# Patient Record
Sex: FEMALE | Race: BLACK OR AFRICAN AMERICAN | NOT HISPANIC OR LATINO | Employment: OTHER | URBAN - METROPOLITAN AREA
[De-identification: names, ages, dates, MRNs, and addresses within clinical notes are randomized per-mention and may not be internally consistent; named-entity substitution may affect disease eponyms.]

---

## 2020-02-26 ENCOUNTER — TELEPHONE (OUTPATIENT)
Dept: PRIMARY CARE CLINIC | Facility: CLINIC | Age: 75
End: 2020-02-26

## 2020-02-26 NOTE — TELEPHONE ENCOUNTER
I sw pt. She is unsure if  is in network with her ins. She will contact them and let me know tomorrow. She will also check her medications to see how much she has left

## 2020-02-26 NOTE — TELEPHONE ENCOUNTER
----- Message from Anne Hardwick sent at 2/26/2020  3:37 PM CST -----  Contact: pt  Patient contacted [facility]ing scheduling a NPP appointment with this provider. Pt stated she has seen this provider at  and is transitioning over to [facility]. Pt has requested to see this provider, there are no appointments showing in Epic until 4/2020. Is it anyway this patient can be worked into the schedule for a sooner appointment possibly this week or next due to medication refills needed? Please contact pt to advise.

## 2020-02-27 ENCOUNTER — TELEPHONE (OUTPATIENT)
Dept: PRIMARY CARE CLINIC | Facility: CLINIC | Age: 75
End: 2020-02-27

## 2020-02-27 NOTE — TELEPHONE ENCOUNTER
----- Message from Mary Ann Hilliard sent at 2/27/2020  8:57 AM CST -----  Contact: [phone]  Patient called to let Dr. Smith know how many pill she has left on:  Atenolol-12 pills  Amlodipine-8 pills  Please call and advise.

## 2020-02-27 NOTE — TELEPHONE ENCOUNTER
Lm on pts vm to contact the office. Pt was supposed to let me know if she is able to see  with her new ins.

## 2020-02-27 NOTE — TELEPHONE ENCOUNTER
----- Message from Zee Calhoun sent at 2/27/2020 10:49 AM CST -----  Contact: [phone]  Patient is requesting a call from Lisa regarding an appt to see the doctor next week.    Please advise, thank you.

## 2020-02-28 RX ORDER — AMLODIPINE BESYLATE 10 MG/1
10 TABLET ORAL DAILY
COMMUNITY
Start: 2020-02-02 | End: 2020-03-02 | Stop reason: SDUPTHER

## 2020-02-28 RX ORDER — FLUTICASONE PROPIONATE 50 MCG
SPRAY, SUSPENSION (ML) NASAL
COMMUNITY
Start: 2020-02-20 | End: 2020-06-26 | Stop reason: SDUPTHER

## 2020-02-28 RX ORDER — ESCITALOPRAM OXALATE 10 MG/1
10 TABLET ORAL DAILY
COMMUNITY
Start: 2020-01-06 | End: 2020-03-02 | Stop reason: SDUPTHER

## 2020-02-28 RX ORDER — ERYTHROMYCIN 5 MG/G
OINTMENT OPHTHALMIC
COMMUNITY
Start: 2019-12-19 | End: 2020-03-02

## 2020-02-28 RX ORDER — AMLODIPINE BESYLATE 5 MG/1
5 TABLET ORAL DAILY
COMMUNITY
Start: 2019-12-04 | End: 2020-03-02

## 2020-02-28 RX ORDER — HYDROCHLOROTHIAZIDE 25 MG/1
25 TABLET ORAL DAILY
COMMUNITY
Start: 2019-12-18 | End: 2020-03-02 | Stop reason: SDUPTHER

## 2020-02-28 RX ORDER — ATENOLOL 50 MG/1
50 TABLET ORAL DAILY
COMMUNITY
Start: 2020-02-03 | End: 2020-03-02 | Stop reason: SDUPTHER

## 2020-02-28 RX ORDER — ROSUVASTATIN CALCIUM 5 MG/1
5 TABLET, COATED ORAL DAILY
COMMUNITY
Start: 2020-01-31 | End: 2020-03-02 | Stop reason: SDUPTHER

## 2020-03-02 ENCOUNTER — OFFICE VISIT (OUTPATIENT)
Dept: PRIMARY CARE CLINIC | Facility: CLINIC | Age: 75
End: 2020-03-02
Payer: MEDICARE

## 2020-03-02 VITALS
HEART RATE: 68 BPM | TEMPERATURE: 98 F | RESPIRATION RATE: 19 BRPM | SYSTOLIC BLOOD PRESSURE: 123 MMHG | DIASTOLIC BLOOD PRESSURE: 63 MMHG | BODY MASS INDEX: 31.93 KG/M2 | OXYGEN SATURATION: 99 % | HEIGHT: 62 IN | WEIGHT: 173.5 LBS

## 2020-03-02 DIAGNOSIS — I10 HYPERTENSION, UNSPECIFIED TYPE: Primary | ICD-10-CM

## 2020-03-02 DIAGNOSIS — J00 ACUTE RHINITIS: ICD-10-CM

## 2020-03-02 DIAGNOSIS — F41.9 ANXIETY: ICD-10-CM

## 2020-03-02 DIAGNOSIS — R39.9 URINARY SYMPTOM OR SIGN: ICD-10-CM

## 2020-03-02 DIAGNOSIS — E78.5 HYPERLIPIDEMIA, UNSPECIFIED HYPERLIPIDEMIA TYPE: ICD-10-CM

## 2020-03-02 DIAGNOSIS — E04.2 MULTIPLE THYROID NODULES: ICD-10-CM

## 2020-03-02 LAB
BILIRUB SERPL-MCNC: NEGATIVE MG/DL
BLOOD URINE, POC: NEGATIVE
COLOR, POC UA: YELLOW
CTP QC/QA: YES
FLUAV AG NPH QL: NEGATIVE
FLUBV AG NPH QL: NEGATIVE
GLUCOSE UR QL STRIP: NORMAL
KETONES UR QL STRIP: NEGATIVE
LEUKOCYTE ESTERASE URINE, POC: NEGATIVE
NITRITE, POC UA: NEGATIVE
PH, POC UA: 7
PROTEIN, POC: NEGATIVE
SPECIFIC GRAVITY, POC UA: 1
UROBILINOGEN, POC UA: NORMAL

## 2020-03-02 PROCEDURE — 3078F PR MOST RECENT DIASTOLIC BLOOD PRESSURE < 80 MM HG: ICD-10-PCS | Mod: HCNC,CPTII,S$GLB, | Performed by: INTERNAL MEDICINE

## 2020-03-02 PROCEDURE — 3078F DIAST BP <80 MM HG: CPT | Mod: HCNC,CPTII,S$GLB, | Performed by: INTERNAL MEDICINE

## 2020-03-02 PROCEDURE — 1101F PT FALLS ASSESS-DOCD LE1/YR: CPT | Mod: HCNC,CPTII,S$GLB, | Performed by: INTERNAL MEDICINE

## 2020-03-02 PROCEDURE — 87804 INFLUENZA ASSAY W/OPTIC: CPT | Mod: QW,HCNC,S$GLB, | Performed by: INTERNAL MEDICINE

## 2020-03-02 PROCEDURE — 99999 PR PBB SHADOW E&M-EST. PATIENT-LVL III: CPT | Mod: PBBFAC,HCNC,, | Performed by: INTERNAL MEDICINE

## 2020-03-02 PROCEDURE — 1159F PR MEDICATION LIST DOCUMENTED IN MEDICAL RECORD: ICD-10-PCS | Mod: HCNC,S$GLB,, | Performed by: INTERNAL MEDICINE

## 2020-03-02 PROCEDURE — 1101F PR PT FALLS ASSESS DOC 0-1 FALLS W/OUT INJ PAST YR: ICD-10-PCS | Mod: HCNC,CPTII,S$GLB, | Performed by: INTERNAL MEDICINE

## 2020-03-02 PROCEDURE — 1126F AMNT PAIN NOTED NONE PRSNT: CPT | Mod: HCNC,S$GLB,, | Performed by: INTERNAL MEDICINE

## 2020-03-02 PROCEDURE — 1159F MED LIST DOCD IN RCRD: CPT | Mod: HCNC,S$GLB,, | Performed by: INTERNAL MEDICINE

## 2020-03-02 PROCEDURE — 99999 PR PBB SHADOW E&M-EST. PATIENT-LVL III: ICD-10-PCS | Mod: PBBFAC,HCNC,, | Performed by: INTERNAL MEDICINE

## 2020-03-02 PROCEDURE — 99214 OFFICE O/P EST MOD 30 MIN: CPT | Mod: HCNC,25,S$GLB, | Performed by: INTERNAL MEDICINE

## 2020-03-02 PROCEDURE — 3074F PR MOST RECENT SYSTOLIC BLOOD PRESSURE < 130 MM HG: ICD-10-PCS | Mod: HCNC,CPTII,S$GLB, | Performed by: INTERNAL MEDICINE

## 2020-03-02 PROCEDURE — 87804 POCT INFLUENZA A/B: ICD-10-PCS | Mod: 59,QW,HCNC,S$GLB | Performed by: INTERNAL MEDICINE

## 2020-03-02 PROCEDURE — 1126F PR PAIN SEVERITY QUANTIFIED, NO PAIN PRESENT: ICD-10-PCS | Mod: HCNC,S$GLB,, | Performed by: INTERNAL MEDICINE

## 2020-03-02 PROCEDURE — 81002 POCT URINE DIPSTICK WITHOUT MICROSCOPE: ICD-10-PCS | Mod: HCNC,S$GLB,, | Performed by: INTERNAL MEDICINE

## 2020-03-02 PROCEDURE — 81002 URINALYSIS NONAUTO W/O SCOPE: CPT | Mod: HCNC,S$GLB,, | Performed by: INTERNAL MEDICINE

## 2020-03-02 PROCEDURE — 3074F SYST BP LT 130 MM HG: CPT | Mod: HCNC,CPTII,S$GLB, | Performed by: INTERNAL MEDICINE

## 2020-03-02 PROCEDURE — 99214 PR OFFICE/OUTPT VISIT, EST, LEVL IV, 30-39 MIN: ICD-10-PCS | Mod: HCNC,25,S$GLB, | Performed by: INTERNAL MEDICINE

## 2020-03-02 RX ORDER — ROSUVASTATIN CALCIUM 5 MG/1
5 TABLET, COATED ORAL DAILY
Qty: 90 TABLET | Refills: 1 | Status: SHIPPED | OUTPATIENT
Start: 2020-03-02 | End: 2020-06-26 | Stop reason: SDUPTHER

## 2020-03-02 RX ORDER — LORATADINE 10 MG/1
10 TABLET ORAL DAILY PRN
COMMUNITY

## 2020-03-02 RX ORDER — ATENOLOL 50 MG/1
50 TABLET ORAL DAILY
Qty: 90 TABLET | Refills: 1 | Status: SHIPPED | OUTPATIENT
Start: 2020-03-02 | End: 2020-06-02 | Stop reason: SDUPTHER

## 2020-03-02 RX ORDER — NAPROXEN SODIUM 220 MG/1
81 TABLET, FILM COATED ORAL DAILY
COMMUNITY

## 2020-03-02 RX ORDER — HYDROCHLOROTHIAZIDE 25 MG/1
25 TABLET ORAL DAILY
Qty: 90 TABLET | Refills: 1 | Status: SHIPPED | OUTPATIENT
Start: 2020-03-02 | End: 2020-06-02 | Stop reason: SDUPTHER

## 2020-03-02 RX ORDER — AMLODIPINE BESYLATE 10 MG/1
10 TABLET ORAL DAILY
Qty: 90 TABLET | Refills: 1 | Status: SHIPPED | OUTPATIENT
Start: 2020-03-02 | End: 2020-06-02 | Stop reason: SDUPTHER

## 2020-03-02 RX ORDER — EPINEPHRINE 0.3 MG/.3ML
INJECTION SUBCUTANEOUS
COMMUNITY
Start: 2018-11-20 | End: 2021-01-11 | Stop reason: SDUPTHER

## 2020-03-02 RX ORDER — NICOTINE POLACRILEX 2 MG
1000 GUM BUCCAL DAILY
COMMUNITY
Start: 2017-12-12 | End: 2023-03-01

## 2020-03-02 RX ORDER — ESCITALOPRAM OXALATE 10 MG/1
10 TABLET ORAL DAILY
Qty: 90 TABLET | Refills: 1 | Status: SHIPPED | OUTPATIENT
Start: 2020-03-02 | End: 2020-06-26 | Stop reason: SDUPTHER

## 2020-03-02 NOTE — PROGRESS NOTES
[facility] Note    Chief Complaint      Chief Complaint   Patient presents with    Annual Exam       History of Present Illness      Jasmin Alfaro is a 74 y.o. AAF with HTN, HLD, AR, and Anxiety presents to re-est care with me and to fu chronic issues.  Last visit with me was at [facility].  Await old records to review.     Acute rhinitis - Pt reports muscle aches.  No fver.  +Occipital Arevalo, Sore throat.  Suspect Postnasal drip.  Rec cont Claritin and Flonase.  Rapid Flu - neg today.    Urinary Sx's - Will check dip Ua - neg.      HTN - Controlled on Atenolol 50 mg/d , Amlodipine 10 mg/d, HCTZ 25 mg QAm.    HLD - Pt on Crestor 5 mg QHS.     AR -Pt on Claritin and Flonase prn.    Anxiety - Pt on Lexapro 10 mg/d.     GERD - Pt on Omeprazole prn.  Rec Reflux prec.     Thyroid nodules - Fu by Dr. Clemente at [facility].     HCM - Flu - none - pt defers;  Tdap - ?< 10 yrs;  PCV 13 - ?;  PVX 23 - ?;  Shingrix - none;  MGM - due? - pt will check;  DEXA - ?;  PAP - no longer gets; Hep C Screen - none - pt defers; C-scope - '16? +Polyps;  GI - ? In [place];  Prev PCP - me then Dr. Kennedy; Ophtho - Tiffanie Lan    Past Medical History:  Past Medical History:   Diagnosis Date    Allergic rhinitis     Anxiety     Colon polyps     Hyperlipidemia     Hypertension     Multiple thyroid nodules        Past Surgical History:   has a past surgical history that includes Hysterectomy; Appendectomy; Incisional hernia repair; Laparotomy; and Biopsy of thyroid.    Family History:  family history includes Cancer in her mother; Colon cancer in her brother; Heart failure in her brother and mother; Hypertension in her brother, father, mother, and sister; Kidney failure in her brother and sister.     Social History:  Social History     Tobacco Use    Smoking status: Never Smoker    Smokeless tobacco: Never Used   Substance Use Topics    Alcohol use: Not Currently     Frequency: Never    Drug use: Never       Review of Systems  "  Constitutional: Negative for chills, diaphoresis and fever.   HENT: Positive for sore throat.    Eyes: Negative for blurred vision and double vision.        Wears glasses   Respiratory: Negative for cough, shortness of breath and wheezing.    Cardiovascular: Negative for chest pain and palpitations.   Gastrointestinal: Positive for abdominal pain and heartburn. Negative for blood in stool, constipation, diarrhea, melena, nausea and vomiting.        Lower abdomen    Genitourinary: Positive for frequency. Negative for dysuria.        Dark yellow, foul smelling urine.  No Dysuria.    Musculoskeletal: Positive for back pain and myalgias.        Low back pain   Skin: Negative for itching and rash.   Neurological: Positive for headaches. Negative for dizziness.        Occipital    Endo/Heme/Allergies: Negative for polydipsia. Does not bruise/bleed easily.   Psychiatric/Behavioral: Negative for depression and suicidal ideas. The patient is not nervous/anxious.         "I feel better since taking the Lexapro".         Medications:  Outpatient Encounter Medications as of 3/2/2020   Medication Sig Dispense Refill    amLODIPine (NORVASC) 10 MG tablet Take 10 mg by mouth once daily.       aspirin 81 MG Chew Take 81 mg by mouth once daily.       atenoloL (TENORMIN) 50 MG tablet Take 50 mg by mouth once daily.       biotin 1 mg Cap Take 1,000 mcg by mouth once daily.       EPINEPHrine (EPIPEN 2-YAA) 0.3 mg/0.3 mL AtIn as needed.       escitalopram oxalate (LEXAPRO) 10 MG tablet Take 10 mg by mouth once daily.      fluticasone propionate (FLONASE) 50 mcg/actuation nasal spray       hydroCHLOROthiazide (HYDRODIURIL) 25 MG tablet Take 25 mg by mouth once daily.      loratadine (CLARITIN) 10 mg tablet Take 10 mg by mouth daily as needed.       multivit with minerals/lutein (MULTIVITAMIN 50 PLUS ORAL) Take by mouth once daily.       rosuvastatin (CRESTOR) 5 MG tablet Take 5 mg by mouth once daily.      [DISCONTINUED] " amLODIPine (NORVASC) 5 MG tablet Take 5 mg by mouth once daily.      [DISCONTINUED] erythromycin (ROMYCIN) ophthalmic ointment ADMINISTER TO THE RIGHT EYE 4 (FOUR) TIMES A DAY FOR 7 DAYS.       No facility-administered encounter medications on file as of 3/2/2020.        Current Outpatient Medications:     amLODIPine (NORVASC) 10 MG tablet, Take 10 mg by mouth once daily. , Disp: , Rfl:     aspirin 81 MG Chew, Take 81 mg by mouth once daily. , Disp: , Rfl:     atenoloL (TENORMIN) 50 MG tablet, Take 50 mg by mouth once daily. , Disp: , Rfl:     biotin 1 mg Cap, Take 1,000 mcg by mouth once daily. , Disp: , Rfl:     EPINEPHrine (EPIPEN 2-YAA) 0.3 mg/0.3 mL AtIn, as needed. , Disp: , Rfl:     escitalopram oxalate (LEXAPRO) 10 MG tablet, Take 10 mg by mouth once daily., Disp: , Rfl:     fluticasone propionate (FLONASE) 50 mcg/actuation nasal spray, , Disp: , Rfl:     hydroCHLOROthiazide (HYDRODIURIL) 25 MG tablet, Take 25 mg by mouth once daily., Disp: , Rfl:     loratadine (CLARITIN) 10 mg tablet, Take 10 mg by mouth daily as needed. , Disp: , Rfl:     multivit with minerals/lutein (MULTIVITAMIN 50 PLUS ORAL), Take by mouth once daily. , Disp: , Rfl:     rosuvastatin (CRESTOR) 5 MG tablet, Take 5 mg by mouth once daily., Disp: , Rfl:     Allergies:  Review of patient's allergies indicates:   Allergen Reactions    Banana Hives    Meperidine Hives and Other (See Comments)     Unknown reaction         Health Maintenance:    There is no immunization history on file for this patient.   Health Maintenance   Topic Date Due    Hepatitis C Screening  1945    TETANUS VACCINE  06/11/1964    Mammogram  06/11/1986    DEXA SCAN  06/11/1986    Colonoscopy  06/11/1996    Pneumococcal Vaccine (65+ Low/Medium Risk) (1 of 2 - PCV13) 06/11/2011    Lipid Panel  08/22/2012      Objective:      Vital Signs  Temp: 98 °F (36.7 °C)  Temp src: Oral  Pulse: 68  Resp: 19  SpO2: 99 %  BP: 123/63  BP Location: Left  "arm  Patient Position: Sitting  Pain Score: 0-No pain  Height and Weight  Height: 5' 2" (157.5 cm)  Weight: 78.7 kg (173 lb 8 oz)  BSA (Calculated - sq m): 1.86 sq meters  BMI (Calculated): 31.7  Weight in (lb) to have BMI = 25: 136.4]    Laboratory:    Urine Microalbumin/Cr:  Lab Results   Component Value Date    MICALBCREAT 4.8 05/26/2006       Other:   Lab Results   Component Value Date    [id number] 32 09/18/2006     No results found for: PSA, PSADIAG, HEPCAB    Physical Exam   Constitutional: She is oriented to person, place, and time. She appears well-developed and well-nourished. No distress.   HENT:   Head: Normocephalic and atraumatic.   Right Ear: External ear normal.   Left Ear: External ear normal.   Mouth/Throat: Oropharynx is clear and moist.   Eyes: Pupils are equal, round, and reactive to light. Conjunctivae and EOM are normal.   Neck: Normal range of motion. Neck supple. Thyromegaly present.   Cardiovascular: Normal rate, regular rhythm, normal heart sounds and intact distal pulses.   Pulmonary/Chest: Effort normal and breath sounds normal. No respiratory distress.   Abdominal: Soft. Bowel sounds are normal. She exhibits no distension.   Neurological: She is alert and oriented to person, place, and time.   Skin: Skin is warm and dry. She is not diaphoretic.   Psychiatric: She has a normal mood and affect.   Nursing note and vitals reviewed.          Assessment:       1. Hypertension, unspecified type    2. Hyperlipidemia, unspecified hyperlipidemia type    3. Urinary symptom or sign    4. Anxiety    5. Acute rhinitis    6. Multiple thyroid nodules        Jasmin Alfaro is a 74 y.o.female with:    1. Hypertension, unspecified type  -Controlled.  Cont current. Regimen.  Refilled meds.    2. Hyperlipidemia, unspecified hyperlipidemia type  -Cont current.  Will get copy of recent labs from Dr. Kennedy.     3. Urinary symptom or sign  - POCT urine dipstick without microscope  - Dip Ua - neg.     4. " Anxiety  -Improved on Lexapro.  Cont current regimen. Will refill.     5. Acute rhinitis  - POCT Influenza A/B  -Rapid Flu neg.  rec cont Flonasse and Claritin.  Rec supportive care.  Rec strict hand hygiene.  Rec APAP prn T > 100.3/pain. RTC or alert MD if Symptoms persist/worsen.     6. Multiple thyroid nodules  -will get old records to review.     Chronic conditions status updated as per HPI.  Other than changes above, cont current medications and maintain follow up with specialists.  Return to clinic in 3 months or sooner if needed.    Gloria Smith MD  [facility]

## 2020-03-23 ENCOUNTER — TELEPHONE (OUTPATIENT)
Dept: PRIMARY CARE CLINIC | Facility: CLINIC | Age: 75
End: 2020-03-23

## 2020-03-23 NOTE — TELEPHONE ENCOUNTER
Pt is at home quarantining right now. She was in contact with someone that had contact with a positive covid19 pt. She is on day 5 currently.    She is c/o scratchy throat,cough,headaches(which is normal for pt),BA. The cough is not bad enough to take cough meds.    She would like to know if she needs to do anything additional than what she is currently doing.

## 2020-03-23 NOTE — TELEPHONE ENCOUNTER
----- Message from Mary Ann Souza sent at 3/23/2020  9:49 AM CDT -----  Contact: Self   Pt is requesting a call regarding questions. Please call and advise.   2020

## 2020-03-23 NOTE — TELEPHONE ENCOUNTER
Pt was informed and expressed understanding. I sent her an activation link to set up [employer] and explained how to find the silvia on her mobile device

## 2020-03-23 NOTE — TELEPHONE ENCOUNTER
"No. You are doing everything that can be done att this point.  I will need you to monitor for any worsening of Sx's and alert us immediately.  You must remain on isolation for 7 days after the onset of your symptoms or until 3 days asymptomatic (no fever OFF of any antipyretics such as Tylenol) whichever is longer.  I rec using tylenol for aches/pains/fever and avoiding anti-inflammatories such as Ibuprofen, Motrin, Advil, aleve, Goody's, Standbacks, BC powder as these may worsen COVID - 19 symptoms. I know this is difficult but I think it is necessary to keep you healthy and from potentially spreading this dangerous virus. The hope is that if we all practice social distancing that we can "flatten the curve" and keep the spread of COVID - 19 to a minimum.  Please try to have others get your groceries etc for you if possible or order groceries on-line.  If you must go to the store again please try not to touch things as much as possible.  Please avoid touching your face and keep a 6 ft distance between yourself an others in the home. Sanitize your hands when you get in your car and when you get home.  Please stay safe during this pandemic and call for any other needs. Please also know that we have virtual/telemedicine visits available for any of your needs. The [facility] website and cdc websitee are excellent resources in regards to COVID - 19 and I highly recommend you visit these sites.  They are constantly updated with the ever changing info regarding this virus.    Sincerely,   Dr. GALVAN"

## 2020-03-24 ENCOUNTER — TELEPHONE (OUTPATIENT)
Dept: PRIMARY CARE CLINIC | Facility: CLINIC | Age: 75
End: 2020-03-24

## 2020-03-24 NOTE — TELEPHONE ENCOUNTER
Called to check on pt.  No answer.  Was unable to leave a message. Will send a [employer] message.    Dr. GALVAN

## 2020-03-25 ENCOUNTER — TELEPHONE (OUTPATIENT)
Dept: PRIMARY CARE CLINIC | Facility: CLINIC | Age: 75
End: 2020-03-25

## 2020-03-25 NOTE — TELEPHONE ENCOUNTER
I spoke to pt.  No fever.  Still a little weakness and she continues to self isolate.  Doing ok.    Dr. GALVAN

## 2020-03-26 NOTE — TELEPHONE ENCOUNTER
I called to check on pt.  No answer.  Could not leave a message so I sent her a [employer] message.    Dr. GALVAN

## 2020-03-27 ENCOUNTER — TELEPHONE (OUTPATIENT)
Dept: PRIMARY CARE CLINIC | Facility: CLINIC | Age: 75
End: 2020-03-27

## 2020-03-27 NOTE — TELEPHONE ENCOUNTER
I called to check on pt.  She is doing well.  She deneis any fever or SOB. This is day 8 symptoms.  She is self isolating.    Dr. GALVAN

## 2020-06-01 NOTE — PROGRESS NOTES
[facility] Note    Chief Complaint      Chief Complaint   Patient presents with    Follow-up       History of Present Illness      Jasmin Alfaro is a 74 y.o. AAF with HTN, HLD, AR, and Anxiety presents to  to fu chronic issues.  Last visit -3/2/20.         HTN - Controlled on Atenolol 50 mg/d, Amlodipine 10 mg/d, HCTZ 25 mg QAm.     HLD - Pt on Crestor 5 mg QHS.      AR -Pt on Claritin prn and Flonase daily.      Anxiety - Pt on Lexapro 10 mg/d. She weaned to 1/2 tab every other day.      GERD - Pt on Omeprazole prn.  Rec Reflux prec.      Thyroid nodules - Fu by Dr. Clemente at [facility]. Dominant nodules - 1.7 cm on Rt and 1.5 cm on Left. Has upcoming fu.     MNG - Pt prev fu by ENT, Dr. Patel.  She then fu for a second opinion at [facility] with Dr. Robyn Mann.     Osteopenia - h/o Rt humeral neck fracture.     Cataracts - Fu annually by Dr. Roman,     H/O PE - '74- She was tx at the time.  Pt no longer on Anticoagulation.      HCM - Flu - none - pt defers;  Tdap - ?< 10 yrs;  PCV 13 - ?;  PVX 23 - ?;  Shingrix - none;  MGM - due? - pt will check;  DEXA - 1/8/18;  PAP - no longer gets; Hep C Screen - none - pt defers; C-scope - '16? +Polyps;  GI - ? In [place];  Prev PCP - me then Dr. Kennedy; Ortho - Dr. Rodrigues; Ophtho -        Past Medical History:  Past Medical History:   Diagnosis Date    Allergic rhinitis     Anxiety     Colon polyps     Hyperlipidemia     Hypertension     Multiple thyroid nodules        Past Surgical History:   has a past surgical history that includes Hysterectomy; Appendectomy; Incisional hernia repair; Laparotomy; and Biopsy of thyroid.    Family History:  family history includes Cancer in her mother; Colon cancer in her brother; Heart failure in her brother and mother; Hypertension in her brother, father, mother, and sister; Kidney failure in her brother and sister.     Social History:  Social History     Tobacco Use    Smoking status: Never  "Smoker    Smokeless tobacco: Never Used   Substance Use Topics    Alcohol use: Not Currently     Frequency: Never    Drug use: Never       Review of Systems   Constitutional: Negative for chills, diaphoresis and fever.   HENT: Positive for congestion. Negative for sore throat.    Eyes: Negative for blurred vision and double vision.   Respiratory: Negative for shortness of breath.    Cardiovascular: Negative for chest pain and palpitations.   Gastrointestinal: Positive for constipation. Negative for abdominal pain, blood in stool, diarrhea, heartburn, melena, nausea and vomiting.        Sometimes has constipation   Genitourinary: Negative for dysuria and frequency.   Musculoskeletal: Negative for joint pain and myalgias.   Skin: Positive for itching and rash.        To right knee   Neurological: Positive for headaches. Negative for dizziness.        Rare   Endo/Heme/Allergies: Does not bruise/bleed easily.   Psychiatric/Behavioral: Negative for depression. The patient is not nervous/anxious.         "It's fine".         Medications:  Outpatient Encounter Medications as of 6/2/2020   Medication Sig Dispense Refill    amLODIPine (NORVASC) 10 MG tablet Take 1 tablet (10 mg total) by mouth once daily. 90 tablet 1    aspirin 81 MG Chew Take 81 mg by mouth once daily.       atenoloL (TENORMIN) 50 MG tablet Take 1 tablet (50 mg total) by mouth once daily. 90 tablet 1    biotin 1 mg Cap Take 1,000 mcg by mouth once daily.       EPINEPHrine (EPIPEN 2-YAA) 0.3 mg/0.3 mL AtIn as needed.       escitalopram oxalate (LEXAPRO) 10 MG tablet Take 1 tablet (10 mg total) by mouth once daily. 90 tablet 1    fluticasone propionate (FLONASE) 50 mcg/actuation nasal spray       hydroCHLOROthiazide (HYDRODIURIL) 25 MG tablet Take 1 tablet (25 mg total) by mouth once daily. 90 tablet 1    loratadine (CLARITIN) 10 mg tablet Take 10 mg by mouth daily as needed.       multivit with minerals/lutein (MULTIVITAMIN 50 PLUS ORAL) Take by " mouth once daily.       rosuvastatin (CRESTOR) 5 MG tablet Take 1 tablet (5 mg total) by mouth once daily. 90 tablet 1    [DISCONTINUED] amLODIPine (NORVASC) 10 MG tablet Take 1 tablet (10 mg total) by mouth once daily. 90 tablet 1    [DISCONTINUED] atenoloL (TENORMIN) 50 MG tablet Take 1 tablet (50 mg total) by mouth once daily. 90 tablet 1    [DISCONTINUED] hydroCHLOROthiazide (HYDRODIURIL) 25 MG tablet Take 1 tablet (25 mg total) by mouth once daily. 90 tablet 1    triamcinolone acetonide 0.1% (KENALOG) 0.1 % cream Apply topically 2 (two) times daily. 80 g 0     No facility-administered encounter medications on file as of 6/2/2020.        Current Outpatient Medications:     amLODIPine (NORVASC) 10 MG tablet, Take 1 tablet (10 mg total) by mouth once daily., Disp: 90 tablet, Rfl: 1    aspirin 81 MG Chew, Take 81 mg by mouth once daily. , Disp: , Rfl:     atenoloL (TENORMIN) 50 MG tablet, Take 1 tablet (50 mg total) by mouth once daily., Disp: 90 tablet, Rfl: 1    biotin 1 mg Cap, Take 1,000 mcg by mouth once daily. , Disp: , Rfl:     EPINEPHrine (EPIPEN 2-YAA) 0.3 mg/0.3 mL AtIn, as needed. , Disp: , Rfl:     escitalopram oxalate (LEXAPRO) 10 MG tablet, Take 1 tablet (10 mg total) by mouth once daily., Disp: 90 tablet, Rfl: 1    fluticasone propionate (FLONASE) 50 mcg/actuation nasal spray, , Disp: , Rfl:     hydroCHLOROthiazide (HYDRODIURIL) 25 MG tablet, Take 1 tablet (25 mg total) by mouth once daily., Disp: 90 tablet, Rfl: 1    loratadine (CLARITIN) 10 mg tablet, Take 10 mg by mouth daily as needed. , Disp: , Rfl:     multivit with minerals/lutein (MULTIVITAMIN 50 PLUS ORAL), Take by mouth once daily. , Disp: , Rfl:     rosuvastatin (CRESTOR) 5 MG tablet, Take 1 tablet (5 mg total) by mouth once daily., Disp: 90 tablet, Rfl: 1    pneumoc 13-flaca conj-dip cr,PF, (PREVNAR 13, PF,) 0.5 mL Syrg, Inject 0.5 mLs into the muscle once. For one dose. for 1 dose, Disp: 0.5 mL, Rfl: 0    triamcinolone  "acetonide 0.1% (KENALOG) 0.1 % cream, Apply topically 2 (two) times daily., Disp: 80 g, Rfl: 0    varicella-zoster [id number], PF, (SHINGRIX, PF,) 50 mcg/0.5 mL injection, Inject 0.5 mLs into the muscle once. For one dose. for 1 dose, Disp: 0.5 mL, Rfl: 1    Allergies:  Review of patient's allergies indicates:   Allergen Reactions    Banana Hives    Meperidine Hives and Other (See Comments)     Unknown reaction         Health Maintenance:  Immunization History   Administered Date(s) Administered    Pneumococcal Conjugate - 13 Valent 06/02/2020      Health Maintenance   Topic Date Due    Hepatitis C Screening  1945    TETANUS VACCINE  06/11/1964    Mammogram  06/11/1986    DEXA SCAN  06/11/1986    Colonoscopy  06/11/1996    Pneumococcal Vaccine (65+ Low/Medium Risk) (1 of 2 - PCV13) 06/11/2011    Lipid Panel  08/22/2012      Objective:      Vital Signs  Temp: 97.8 °F (36.6 °C)  Pulse: 86  Resp: 19  SpO2: 99 %  BP: 131/65  BP Location: Right arm  Patient Position: Sitting  Pain Score: 0-No pain  Height and Weight  Height: 5' 2" (157.5 cm)  Weight: 81.2 kg (179 lb 0.2 oz)  BSA (Calculated - sq m): 1.88 sq meters  BMI (Calculated): 32.7  Weight in (lb) to have BMI = 25: 136.4]    Laboratory:    URINALYSIS:  Recent Labs   Lab 03/02/20  1553   Color, UA yellow   Spec Grav UA 1.005   pH, UA 7      Recent Labs   Lab 03/02/20  1553   Nitrite, UA negative   Urobilinogen, UA normal      Urine Microalbumin/Cr:  Lab Results   Component Value Date    MICALBCREAT 4.8 05/26/2006       Other:   Lab Results   Component Value Date    [id number] 32 09/18/2006     Physical Exam   Constitutional: She is oriented to person, place, and time. She appears well-developed and well-nourished. No distress.   HENT:   Head: Normocephalic and atraumatic.   Right Ear: External ear normal.   Left Ear: External ear normal.   Mouth/Throat: Oropharynx is clear and moist.   Eyes: Pupils are equal, round, and reactive to light. " Conjunctivae and EOM are normal.   Neck: Normal range of motion. Neck supple. No JVD present. Thyromegaly present.   Cardiovascular: Normal rate, regular rhythm and intact distal pulses. Exam reveals no gallop and no friction rub.   No murmur heard.  Pulmonary/Chest: Effort normal and breath sounds normal. No respiratory distress. She has no wheezes. She has no rales.   Abdominal: Soft. Bowel sounds are normal. She exhibits no distension and no mass. There is no tenderness. There is no rebound and no guarding.   Musculoskeletal: Normal range of motion. She exhibits no edema or deformity.   Lymphadenopathy:     She has no cervical adenopathy.   Neurological: She is alert and oriented to person, place, and time. No cranial nerve deficit.   Skin: Skin is warm and dry. Capillary refill takes less than 2 seconds. No rash noted. She is not diaphoretic. No cyanosis or erythema. Nails show no clubbing.   Psychiatric: She has a normal mood and affect.   Nursing note and vitals reviewed.          Assessment:       1. Hypertension, unspecified type    2. Hyperlipidemia, unspecified hyperlipidemia type    3. Dermatitis    4. Anxiety    5. Need for vaccination with 13-polyvalent pneumococcal conjugate vaccine    6. Need for hepatitis C screening test        Jasmin Alfaro is a 74 y.o.female with:    1. Hypertension, unspecified type  - Comprehensive metabolic panel; Future  - Lipid Panel; Future  - amLODIPine (NORVASC) 10 MG tablet; Take 1 tablet (10 mg total) by mouth once daily.  Dispense: 90 tablet; Refill: 1  - atenoloL (TENORMIN) 50 MG tablet; Take 1 tablet (50 mg total) by mouth once daily.  Dispense: 90 tablet; Refill: 1  - hydroCHLOROthiazide (HYDRODIURIL) 25 MG tablet; Take 1 tablet (25 mg total) by mouth once daily.  Dispense: 90 tablet; Refill: 1  -Stable.  Cont current regimen.    2. Hyperlipidemia, unspecified hyperlipidemia type  - Lipid Panel; Future  -Stable.  Cont current regimen. Repeat FLP.     3. Dermatitis  -  triamcinolone acetonide 0.1% (KENALOG) 0.1 % cream; Apply topically 2 (two) times daily.  Dispense: 80 g; Refill: 0  -Will Try Triamcinolone.    4. Anxiety  -Stable.     5. Need for vaccination with 13-polyvalent pneumococcal conjugate vaccine  -Admin today.     6. Need for hepatitis C screening test  - Hepatitis C Antibody; Future       Chronic conditions status updated as per HPI.  Other than changes above, cont current medications and maintain follow up with specialists.  Return to clinic in 6 months or sooner if needed.    Gloria Smith MD  [facility]

## 2020-06-02 ENCOUNTER — OFFICE VISIT (OUTPATIENT)
Dept: PRIMARY CARE CLINIC | Facility: CLINIC | Age: 75
End: 2020-06-02
Payer: MEDICARE

## 2020-06-02 ENCOUNTER — IMMUNIZATION (OUTPATIENT)
Dept: PHARMACY | Facility: CLINIC | Age: 75
End: 2020-06-02
Payer: MEDICARE

## 2020-06-02 ENCOUNTER — LAB VISIT (OUTPATIENT)
Dept: LAB | Facility: HOSPITAL | Age: 75
End: 2020-06-02
Attending: INTERNAL MEDICINE
Payer: MEDICARE

## 2020-06-02 VITALS
SYSTOLIC BLOOD PRESSURE: 131 MMHG | RESPIRATION RATE: 19 BRPM | HEIGHT: 62 IN | HEART RATE: 86 BPM | BODY MASS INDEX: 32.94 KG/M2 | OXYGEN SATURATION: 99 % | DIASTOLIC BLOOD PRESSURE: 65 MMHG | WEIGHT: 179 LBS | TEMPERATURE: 98 F

## 2020-06-02 DIAGNOSIS — F41.9 ANXIETY: ICD-10-CM

## 2020-06-02 DIAGNOSIS — Z11.59 NEED FOR HEPATITIS C SCREENING TEST: ICD-10-CM

## 2020-06-02 DIAGNOSIS — Z23 NEED FOR VACCINATION WITH 13-POLYVALENT PNEUMOCOCCAL CONJUGATE VACCINE: ICD-10-CM

## 2020-06-02 DIAGNOSIS — E78.5 HYPERLIPIDEMIA, UNSPECIFIED HYPERLIPIDEMIA TYPE: ICD-10-CM

## 2020-06-02 DIAGNOSIS — I10 HYPERTENSION, UNSPECIFIED TYPE: ICD-10-CM

## 2020-06-02 DIAGNOSIS — I10 HYPERTENSION, UNSPECIFIED TYPE: Primary | ICD-10-CM

## 2020-06-02 DIAGNOSIS — L30.9 DERMATITIS: ICD-10-CM

## 2020-06-02 LAB
ALBUMIN SERPL BCP-MCNC: 4.5 G/DL (ref 3.5–5.2)
ALP SERPL-CCNC: 80 U/L (ref 55–135)
ALT SERPL W/O P-5'-P-CCNC: 19 U/L (ref 10–44)
ANION GAP SERPL CALC-SCNC: 8 MMOL/L (ref 8–16)
AST SERPL-CCNC: 28 U/L (ref 10–40)
BILIRUB SERPL-MCNC: 0.5 MG/DL (ref 0.1–1)
BUN SERPL-MCNC: 16 MG/DL (ref 8–23)
CALCIUM SERPL-MCNC: 10.1 MG/DL (ref 8.7–10.5)
CHLORIDE SERPL-SCNC: 100 MMOL/L (ref 95–110)
CHOLEST SERPL-MCNC: 149 MG/DL (ref 120–199)
CHOLEST/HDLC SERPL: 2.8 {RATIO} (ref 2–5)
CO2 SERPL-SCNC: 28 MMOL/L (ref 23–29)
CREAT SERPL-MCNC: 1.1 MG/DL (ref 0.5–1.4)
EST. GFR  (AFRICAN AMERICAN): 57.6 ML/MIN/1.73 M^2
EST. GFR  (NON AFRICAN AMERICAN): 49.9 ML/MIN/1.73 M^2
GLUCOSE SERPL-MCNC: 111 MG/DL (ref 70–110)
HDLC SERPL-MCNC: 53 MG/DL (ref 40–75)
HDLC SERPL: 35.6 % (ref 20–50)
LDLC SERPL CALC-MCNC: 79.4 MG/DL (ref 63–159)
NONHDLC SERPL-MCNC: 96 MG/DL
POTASSIUM SERPL-SCNC: 3.9 MMOL/L (ref 3.5–5.1)
PROT SERPL-MCNC: 8.1 G/DL (ref 6–8.4)
SODIUM SERPL-SCNC: 136 MMOL/L (ref 136–145)
TRIGL SERPL-MCNC: 83 MG/DL (ref 30–150)

## 2020-06-02 PROCEDURE — 99214 OFFICE O/P EST MOD 30 MIN: CPT | Mod: HCNC,S$GLB,, | Performed by: INTERNAL MEDICINE

## 2020-06-02 PROCEDURE — 1159F PR MEDICATION LIST DOCUMENTED IN MEDICAL RECORD: ICD-10-PCS | Mod: HCNC,S$GLB,, | Performed by: INTERNAL MEDICINE

## 2020-06-02 PROCEDURE — 99499 UNLISTED E&M SERVICE: CPT | Mod: HCNC,S$GLB,, | Performed by: INTERNAL MEDICINE

## 2020-06-02 PROCEDURE — 99214 PR OFFICE/OUTPT VISIT, EST, LEVL IV, 30-39 MIN: ICD-10-PCS | Mod: HCNC,S$GLB,, | Performed by: INTERNAL MEDICINE

## 2020-06-02 PROCEDURE — 3078F PR MOST RECENT DIASTOLIC BLOOD PRESSURE < 80 MM HG: ICD-10-PCS | Mod: HCNC,CPTII,S$GLB, | Performed by: INTERNAL MEDICINE

## 2020-06-02 PROCEDURE — 99499 UNLISTED E&M SERVICE: CPT | Mod: HCNC,,, | Performed by: INTERNAL MEDICINE

## 2020-06-02 PROCEDURE — 86803 HEPATITIS C AB TEST: CPT | Mod: HCNC

## 2020-06-02 PROCEDURE — 99499 RISK ADDL DX/OHS AUDIT: ICD-10-PCS | Mod: HCNC,S$GLB,, | Performed by: INTERNAL MEDICINE

## 2020-06-02 PROCEDURE — 3075F SYST BP GE 130 - 139MM HG: CPT | Mod: HCNC,CPTII,S$GLB, | Performed by: INTERNAL MEDICINE

## 2020-06-02 PROCEDURE — 3078F DIAST BP <80 MM HG: CPT | Mod: HCNC,CPTII,S$GLB, | Performed by: INTERNAL MEDICINE

## 2020-06-02 PROCEDURE — 1159F MED LIST DOCD IN RCRD: CPT | Mod: HCNC,S$GLB,, | Performed by: INTERNAL MEDICINE

## 2020-06-02 PROCEDURE — 1126F PR PAIN SEVERITY QUANTIFIED, NO PAIN PRESENT: ICD-10-PCS | Mod: HCNC,S$GLB,, | Performed by: INTERNAL MEDICINE

## 2020-06-02 PROCEDURE — 80061 LIPID PANEL: CPT | Mod: HCNC

## 2020-06-02 PROCEDURE — 1101F PT FALLS ASSESS-DOCD LE1/YR: CPT | Mod: HCNC,CPTII,S$GLB, | Performed by: INTERNAL MEDICINE

## 2020-06-02 PROCEDURE — 99999 PR PBB SHADOW E&M-EST. PATIENT-LVL IV: CPT | Mod: PBBFAC,HCNC,, | Performed by: INTERNAL MEDICINE

## 2020-06-02 PROCEDURE — 99999 PR PBB SHADOW E&M-EST. PATIENT-LVL IV: ICD-10-PCS | Mod: PBBFAC,HCNC,, | Performed by: INTERNAL MEDICINE

## 2020-06-02 PROCEDURE — 99499 RISK ADDL DX/OHS AUDIT: ICD-10-PCS | Mod: HCNC,,, | Performed by: INTERNAL MEDICINE

## 2020-06-02 PROCEDURE — 1126F AMNT PAIN NOTED NONE PRSNT: CPT | Mod: HCNC,S$GLB,, | Performed by: INTERNAL MEDICINE

## 2020-06-02 PROCEDURE — 80053 COMPREHEN METABOLIC PANEL: CPT | Mod: HCNC

## 2020-06-02 PROCEDURE — 36415 COLL VENOUS BLD VENIPUNCTURE: CPT | Mod: HCNC,PN

## 2020-06-02 PROCEDURE — 3075F PR MOST RECENT SYSTOLIC BLOOD PRESS GE 130-139MM HG: ICD-10-PCS | Mod: HCNC,CPTII,S$GLB, | Performed by: INTERNAL MEDICINE

## 2020-06-02 PROCEDURE — 1101F PR PT FALLS ASSESS DOC 0-1 FALLS W/OUT INJ PAST YR: ICD-10-PCS | Mod: HCNC,CPTII,S$GLB, | Performed by: INTERNAL MEDICINE

## 2020-06-02 RX ORDER — AMLODIPINE BESYLATE 10 MG/1
10 TABLET ORAL DAILY
Qty: 90 TABLET | Refills: 1 | Status: SHIPPED | OUTPATIENT
Start: 2020-06-02 | End: 2020-06-26 | Stop reason: SDUPTHER

## 2020-06-02 RX ORDER — ATENOLOL 50 MG/1
50 TABLET ORAL DAILY
Qty: 90 TABLET | Refills: 1 | Status: SHIPPED | OUTPATIENT
Start: 2020-06-02 | End: 2020-06-26 | Stop reason: SDUPTHER

## 2020-06-02 RX ORDER — HYDROCHLOROTHIAZIDE 25 MG/1
25 TABLET ORAL DAILY
Qty: 90 TABLET | Refills: 1 | Status: SHIPPED | OUTPATIENT
Start: 2020-06-02 | End: 2020-06-26 | Stop reason: SDUPTHER

## 2020-06-02 RX ORDER — TRIAMCINOLONE ACETONIDE 1 MG/G
CREAM TOPICAL 2 TIMES DAILY
Qty: 80 G | Refills: 0 | Status: SHIPPED | OUTPATIENT
Start: 2020-06-02 | End: 2023-01-12

## 2020-06-03 LAB — HCV AB SERPL QL IA: NEGATIVE

## 2020-06-04 NOTE — PROGRESS NOTES
I sent pt a my chart message -  I reviewed your labs.  Your HepC screen was negative.  Your kidney function was slightly low but stable for you and ok for age. Your cholesterol is well controlled.  No further recommendations at this time.  Have a Great Day!    Dr. GALVAN

## 2020-06-05 ENCOUNTER — TELEPHONE (OUTPATIENT)
Dept: PRIMARY CARE CLINIC | Facility: CLINIC | Age: 75
End: 2020-06-05

## 2020-06-05 DIAGNOSIS — Z12.31 ENCOUNTER FOR SCREENING MAMMOGRAM FOR MALIGNANT NEOPLASM OF BREAST: Primary | ICD-10-CM

## 2020-06-05 NOTE — TELEPHONE ENCOUNTER
----- Message from Robyn Ramon sent at 6/5/2020 12:33 PM CDT -----  Contact: Patient [phone]  Caller is requesting to schedule their annual screening mammogram appointment. Order is not listed in Epic.  Please enter order and contact patient to schedule.  Where would they like the mammogram performed?:  [facility]   Would the patient rather receive a phone call back or a response via [facility]?:   Call  Additional information:

## 2020-06-17 ENCOUNTER — HOSPITAL ENCOUNTER (OUTPATIENT)
Dept: RADIOLOGY | Facility: HOSPITAL | Age: 75
Discharge: HOME | End: 2020-06-17
Attending: INTERNAL MEDICINE
Payer: MEDICARE

## 2020-06-17 ENCOUNTER — TELEPHONE (OUTPATIENT)
Dept: PRIMARY CARE CLINIC | Facility: CLINIC | Age: 75
End: 2020-06-17

## 2020-06-17 DIAGNOSIS — Z12.31 ENCOUNTER FOR SCREENING MAMMOGRAM FOR MALIGNANT NEOPLASM OF BREAST: ICD-10-CM

## 2020-06-17 PROCEDURE — 77067 SCR MAMMO BI INCL CAD: CPT | Mod: 26,HCNC,, | Performed by: RADIOLOGY

## 2020-06-17 PROCEDURE — 77067 SCR MAMMO BI INCL CAD: CPT | Mod: TC,HCNC,PN

## 2020-06-17 PROCEDURE — 77063 BREAST TOMOSYNTHESIS BI: CPT | Mod: 26,HCNC,, | Performed by: RADIOLOGY

## 2020-06-17 PROCEDURE — 77063 MAMMO DIGITAL SCREENING BILAT WITH TOMOSYNTHESIS_CAD: ICD-10-PCS | Mod: 26,HCNC,, | Performed by: RADIOLOGY

## 2020-06-17 PROCEDURE — 77067 MAMMO DIGITAL SCREENING BILAT WITH TOMOSYNTHESIS_CAD: ICD-10-PCS | Mod: 26,HCNC,, | Performed by: RADIOLOGY

## 2020-06-17 NOTE — TELEPHONE ENCOUNTER
----- Message from Gloria Smith MD sent at 6/17/2020  6:21 PM CDT -----  Good question not per the Radiologists reading.  I don't think you need the [place].    Dr. GALVAN  ----- Message -----  From: Anibal Sauceda LPN  Sent: 6/17/2020   6:15 PM CDT  To: Gloria Smith MD    Patient states every year at [facility] she would get Mammogram and Ultrasound. Patient would like to know if she should get ultrasound this year also.

## 2020-06-17 NOTE — PROGRESS NOTES
I sent pt a my chart message -  I reviewed your Mammogram -  The breasts have scattered areas of fibroglandular density. There is no evidence of suspicious masses, microcalcifications or architectural distortion.     Impression:  No mammographic evidence of malignancy.  Routine screening mammogram in 1 year is recommended.     Dr. GALVAN

## 2020-06-26 DIAGNOSIS — F41.9 ANXIETY: ICD-10-CM

## 2020-06-26 DIAGNOSIS — I10 HYPERTENSION, UNSPECIFIED TYPE: ICD-10-CM

## 2020-06-26 DIAGNOSIS — E78.5 HYPERLIPIDEMIA, UNSPECIFIED HYPERLIPIDEMIA TYPE: ICD-10-CM

## 2020-06-26 RX ORDER — AMLODIPINE BESYLATE 10 MG/1
10 TABLET ORAL DAILY
Qty: 90 TABLET | Refills: 1 | Status: SHIPPED | OUTPATIENT
Start: 2020-06-26 | End: 2021-01-11 | Stop reason: SDUPTHER

## 2020-06-26 RX ORDER — HYDROCHLOROTHIAZIDE 25 MG/1
25 TABLET ORAL DAILY
Qty: 90 TABLET | Refills: 1 | Status: SHIPPED | OUTPATIENT
Start: 2020-06-26 | End: 2020-08-27

## 2020-06-26 RX ORDER — FLUTICASONE PROPIONATE 50 MCG
2 SPRAY, SUSPENSION (ML) NASAL DAILY
Qty: 54.6 ML | Refills: 1 | Status: SHIPPED | OUTPATIENT
Start: 2020-06-26 | End: 2020-11-08

## 2020-06-26 RX ORDER — ESCITALOPRAM OXALATE 10 MG/1
10 TABLET ORAL DAILY
Qty: 90 TABLET | Refills: 1 | Status: SHIPPED | OUTPATIENT
Start: 2020-06-26 | End: 2020-08-14

## 2020-06-26 RX ORDER — ROSUVASTATIN CALCIUM 5 MG/1
5 TABLET, COATED ORAL DAILY
Qty: 90 TABLET | Refills: 1 | Status: SHIPPED | OUTPATIENT
Start: 2020-06-26 | End: 2020-11-08

## 2020-06-26 RX ORDER — ATENOLOL 50 MG/1
50 TABLET ORAL DAILY
Qty: 90 TABLET | Refills: 1 | Status: SHIPPED | OUTPATIENT
Start: 2020-06-26 | End: 2020-11-08

## 2020-06-26 NOTE — TELEPHONE ENCOUNTER
LOv 6/2/2020\  Pt would like all her meds sent to [facility] pharmacy vs locally

## 2020-07-15 ENCOUNTER — TELEPHONE (OUTPATIENT)
Dept: PRIMARY CARE CLINIC | Facility: CLINIC | Age: 75
End: 2020-07-15

## 2020-07-15 NOTE — TELEPHONE ENCOUNTER
----- Message from Carmina Sawant sent at 7/15/2020 11:50 AM CDT -----  Regarding: pre op clearance  Contact: jazzy [phone]  Patient is having eye surgery which is scheduled for 8/20/2020. No appointments are available

## 2020-07-17 ENCOUNTER — TELEPHONE (OUTPATIENT)
Dept: PRIMARY CARE CLINIC | Facility: CLINIC | Age: 75
End: 2020-07-17

## 2020-07-17 NOTE — TELEPHONE ENCOUNTER
----- Message from Lu Smith sent at 7/17/2020  2:25 PM CDT -----  Contact: Patient [phone]  Returning a phone call.    Who left a message for the patient:  nurse    Do they know what this is regarding:  a pre op appt for the soonest    Would they like a phone call back or a response via [facility]:  call back

## 2020-07-17 NOTE — TELEPHONE ENCOUNTER
----- Message from Mireya Singh sent at 7/17/2020 11:06 AM CDT -----  Contact: self [phone]  Patient is returning a phone call.  Who left a message for the patient: Lisa Arcos MA  Does patient know what this is regarding:  pre op appt  Comments:

## 2020-07-31 ENCOUNTER — PATIENT OUTREACH (OUTPATIENT)
Dept: ADMINISTRATIVE | Facility: HOSPITAL | Age: 75
End: 2020-07-31

## 2020-07-31 NOTE — PROGRESS NOTES
Pre-visit chart review completed.  Immunizations reviewed and updated.    Patient due for the following    TETANUS VACCINE     DEXA SCAN     Shingles Vaccine (1 of 2)    Colorectal Cancer Screening

## 2020-08-12 ENCOUNTER — TELEPHONE (OUTPATIENT)
Dept: PRIMARY CARE CLINIC | Facility: CLINIC | Age: 75
End: 2020-08-12

## 2020-08-12 NOTE — TELEPHONE ENCOUNTER
----- Message from Jordyn Hui sent at 8/12/2020 11:29 AM CDT -----  Regarding: Ms. CastellanosFiylg-[phone]  Ms. Castellanos is requesting a callback.      Callback number: Ms. CastellanosTgmzj-[phone]

## 2020-08-12 NOTE — TELEPHONE ENCOUNTER
I sw pt. Her sister tested positive for covid. She was in contact with her last week. Pt is asymptomatic and went to [facility] site on Monday 8/10/2020 for testing. Her results are still pending. Pt is having surgery on 8/20/2020 and is scheduled for pre op exam on 8/14/2020. She will keep us posted on the results

## 2020-08-13 ENCOUNTER — TELEPHONE (OUTPATIENT)
Dept: PRIMARY CARE CLINIC | Facility: CLINIC | Age: 75
End: 2020-08-13

## 2020-08-13 RX ORDER — DIPHENHYDRAMINE HCL 25 MG
TABLET ORAL
COMMUNITY
End: 2020-08-14

## 2020-08-13 RX ORDER — DUREZOL 0.5 MG/ML
EMULSION OPHTHALMIC
COMMUNITY
Start: 2020-07-02 | End: 2021-01-11

## 2020-08-13 RX ORDER — OFLOXACIN 3 MG/ML
SOLUTION/ DROPS OPHTHALMIC
COMMUNITY
Start: 2020-07-02 | End: 2021-01-11

## 2020-08-14 ENCOUNTER — TELEPHONE (OUTPATIENT)
Dept: PRIMARY CARE CLINIC | Facility: CLINIC | Age: 75
End: 2020-08-14

## 2020-08-14 ENCOUNTER — OFFICE VISIT (OUTPATIENT)
Dept: PRIMARY CARE CLINIC | Facility: CLINIC | Age: 75
End: 2020-08-14
Payer: MEDICARE

## 2020-08-14 VITALS — SYSTOLIC BLOOD PRESSURE: 141 MMHG | DIASTOLIC BLOOD PRESSURE: 72 MMHG | HEART RATE: 65 BPM

## 2020-08-14 DIAGNOSIS — Z12.11 COLON CANCER SCREENING: Primary | ICD-10-CM

## 2020-08-14 DIAGNOSIS — Z01.818 PREOP EXAMINATION: Primary | ICD-10-CM

## 2020-08-14 DIAGNOSIS — I10 HYPERTENSION, UNSPECIFIED TYPE: ICD-10-CM

## 2020-08-14 DIAGNOSIS — E78.5 HYPERLIPIDEMIA, UNSPECIFIED HYPERLIPIDEMIA TYPE: ICD-10-CM

## 2020-08-14 PROBLEM — J00 ACUTE RHINITIS: Status: RESOLVED | Noted: 2020-03-02 | Resolved: 2020-08-14

## 2020-08-14 PROBLEM — R39.9 URINARY SYMPTOM OR SIGN: Status: RESOLVED | Noted: 2020-03-02 | Resolved: 2020-08-14

## 2020-08-14 PROBLEM — Z11.59 NEED FOR HEPATITIS C SCREENING TEST: Status: RESOLVED | Noted: 2020-06-02 | Resolved: 2020-08-14

## 2020-08-14 PROCEDURE — 99213 OFFICE O/P EST LOW 20 MIN: CPT | Mod: HCNC,95,, | Performed by: INTERNAL MEDICINE

## 2020-08-14 PROCEDURE — 1159F PR MEDICATION LIST DOCUMENTED IN MEDICAL RECORD: ICD-10-PCS | Mod: HCNC,95,, | Performed by: INTERNAL MEDICINE

## 2020-08-14 PROCEDURE — 1159F MED LIST DOCD IN RCRD: CPT | Mod: HCNC,95,, | Performed by: INTERNAL MEDICINE

## 2020-08-14 PROCEDURE — 1101F PR PT FALLS ASSESS DOC 0-1 FALLS W/OUT INJ PAST YR: ICD-10-PCS | Mod: HCNC,CPTII,95, | Performed by: INTERNAL MEDICINE

## 2020-08-14 PROCEDURE — 3078F DIAST BP <80 MM HG: CPT | Mod: HCNC,CPTII,95, | Performed by: INTERNAL MEDICINE

## 2020-08-14 PROCEDURE — 99213 PR OFFICE/OUTPT VISIT, EST, LEVL III, 20-29 MIN: ICD-10-PCS | Mod: HCNC,95,, | Performed by: INTERNAL MEDICINE

## 2020-08-14 PROCEDURE — 3077F PR MOST RECENT SYSTOLIC BLOOD PRESSURE >= 140 MM HG: ICD-10-PCS | Mod: HCNC,CPTII,95, | Performed by: INTERNAL MEDICINE

## 2020-08-14 PROCEDURE — 1101F PT FALLS ASSESS-DOCD LE1/YR: CPT | Mod: HCNC,CPTII,95, | Performed by: INTERNAL MEDICINE

## 2020-08-14 PROCEDURE — 3078F PR MOST RECENT DIASTOLIC BLOOD PRESSURE < 80 MM HG: ICD-10-PCS | Mod: HCNC,CPTII,95, | Performed by: INTERNAL MEDICINE

## 2020-08-14 PROCEDURE — 3077F SYST BP >= 140 MM HG: CPT | Mod: HCNC,CPTII,95, | Performed by: INTERNAL MEDICINE

## 2020-08-14 NOTE — TELEPHONE ENCOUNTER
----- Message from Gloria Smith MD sent at 8/14/2020 12:46 PM CDT -----  It appears pt is due for repeat C-scope.  I would like to refer her to GI for C-scope if she is agreeable.  I think she had polyps per C-scope in '15 and was told to fu in 5 yrs.  Let me know if she is ok with this and I can put in referral.     Dr. GALVAN

## 2020-08-14 NOTE — PROGRESS NOTES
"[facility] Note    Chief Complaint    No chief complaint on file.      History of Present Illness      Jasmin Alfaro is a 75 y.o. AAF with HTN, HLD, AR, and Anxiety presents to  to fu chronic issues.  Last visit -6/2/20.    The patient location is: "home"  The chief complaint leading to consultation is: "Preop clearance"    Visit type: audiovisual    Face to Face time with patient:10 minutes  10 minutes of total time spent on the encounter, which includes face to face time and non-face to face time preparing to see the patient (eg, review of tests), Obtaining and/or reviewing separately obtained history, Documenting clinical information in the electronic or other health record, Independently interpreting results (not separately reported) and communicating results to the patient/family/caregiver, or Care coordination (not separately reported).         Each patient to whom he or she provides medical services by telemedicine is:  (1) informed of the relationship between the physician and patient and the respective role of any other health care provider with respect to management of the patient; and (2) notified that he or she may decline to receive medical services by telemedicine and may withdraw from such care at any time.    Notes:     Preop exam -   Pt sched for RT cataract Extraction to be done 8/20/20      HTN - Controlled on Atenolol 50 mg/d, Amlodipine 10 mg/d, HCTZ 25 mg QAm.     HLD - Pt controlled on Crestor 5 mg QHS.      AR -Pt on Claritin prn and Flonase daily.      Anxiety - Pt weaned off Lexapro 10 mg/d due to wt gain. "I'm doing fine right now".       GERD - Pt on Omeprazole prn.  Rec Reflux prec.      Thyroid nodules - Fu by Dr. Clemente at [facility]. Dominant nodules - 1.7 cm on Rt and 1.5 cm on Left. Has upcoming fu in Sept.      MNG - Pt prev fu by ENT, Dr. Patel.  She then fu for a second opinion at [facility] with Dr. Robyn Mann.      Osteopenia - h/o Rt humeral neck fracture. "      Cataracts - Fu annually by Dr. Roman,      H/O PE - '74- She was tx at the time.  Pt no longer on Anticoagulation.      HCM - Flu - none - pt defers;  Tdap - ?< 10 yrs;  PCV 13 - ?;  PVX 23 - ?;  Shingrix - none;  MGM - due? - pt will check;  DEXA - 1/8/18;  PAP - no longer gets; Hep C Screen - neg - 6/2/20; C-scope - '15 +Polyps- told to repeat in 5 yrs- will refer to GI;  GI - ? In [place];  Prev PCP - me then Dr. Kennedy; Ortho - Dr. Rodrigues; Ophtho -       Past Medical History:  Past Medical History:   Diagnosis Date    Allergic rhinitis     Anxiety     Colon polyps     Hyperlipidemia     Hypertension     Multiple thyroid nodules        Past Surgical History:   has a past surgical history that includes Hysterectomy; Appendectomy; Incisional hernia repair; Laparotomy; and Biopsy of thyroid.    Family History:  family history includes Cancer in her mother; Colon cancer in her brother; Heart failure in her brother and mother; Hypertension in her brother, father, mother, and sister; Kidney failure in her brother and sister.     Social History:  Social History     Tobacco Use    Smoking status: Never Smoker    Smokeless tobacco: Never Used   Substance Use Topics    Alcohol use: Not Currently     Frequency: Never    Drug use: Never       Review of Systems   Constitutional: Negative for chills, diaphoresis and fever.   HENT: Negative for hearing loss and sore throat.    Eyes: Negative for blurred vision, double vision and discharge.        Wears glasses.  Glare bothers her.    Respiratory: Negative for cough, shortness of breath and wheezing.    Cardiovascular: Negative for chest pain and palpitations.   Gastrointestinal: Negative for blood in stool, constipation, diarrhea, nausea and vomiting.   Genitourinary: Negative for dysuria and hematuria.   Musculoskeletal: Negative for neck pain.   Neurological: Negative for weakness and headaches.   Endo/Heme/Allergies: Negative for polydipsia.    Psychiatric/Behavioral: The patient is not nervous/anxious.         Medications:  Outpatient Encounter Medications as of 8/14/2020   Medication Sig Dispense Refill    amLODIPine (NORVASC) 10 MG tablet Take 1 tablet (10 mg total) by mouth once daily. 90 tablet 1    aspirin 81 MG Chew Take 81 mg by mouth once daily.       atenoloL (TENORMIN) 50 MG tablet Take 1 tablet (50 mg total) by mouth once daily. 90 tablet 1    biotin 1 mg Cap Take 1,000 mcg by mouth once daily.       fluticasone propionate (FLONASE) 50 mcg/actuation nasal spray 2 sprays (100 mcg total) by Each Nostril route once daily. 54.6 mL 1    hydroCHLOROthiazide (HYDRODIURIL) 25 MG tablet Take 1 tablet (25 mg total) by mouth once daily. 90 tablet 1    loratadine (CLARITIN) 10 mg tablet Take 10 mg by mouth daily as needed.       multivit with minerals/lutein (MULTIVITAMIN 50 PLUS ORAL) Take by mouth once daily.       ofloxacin (OCUFLOX) 0.3 % ophthalmic solution USE 1 DROP INTO RIGHT EYE FOUR TIMES A DAY AS DIRECTED START DAY BEFORE SURGERY      rosuvastatin (CRESTOR) 5 MG tablet Take 1 tablet (5 mg total) by mouth once daily. 90 tablet 1    triamcinolone acetonide 0.1% (KENALOG) 0.1 % cream Apply topically 2 (two) times daily. 80 g 0    DUREZOL 0.05 % Drop ophthalmic solution INSTILL 2 DROP INTO RIGHT EYE TWICE A DAY AS DIRECTED START AFTER SURGERY      EPINEPHrine (EPIPEN 2-YAA) 0.3 mg/0.3 mL AtIn as needed.       [DISCONTINUED] diphenhydrAMINE (BENADRYL ALLERGY) 25 mg tablet 1 tablet as needed      [DISCONTINUED] escitalopram oxalate (LEXAPRO) 10 MG tablet Take 1 tablet (10 mg total) by mouth once daily. 90 tablet 1    [DISCONTINUED] ranitidine (ZANTAC) 150 MG capsule 1 tablet       No facility-administered encounter medications on file as of 8/14/2020.        Current Outpatient Medications:     amLODIPine (NORVASC) 10 MG tablet, Take 1 tablet (10 mg total) by mouth once daily., Disp: 90 tablet, Rfl: 1    aspirin 81 MG Chew, Take 81  mg by mouth once daily. , Disp: , Rfl:     atenoloL (TENORMIN) 50 MG tablet, Take 1 tablet (50 mg total) by mouth once daily., Disp: 90 tablet, Rfl: 1    biotin 1 mg Cap, Take 1,000 mcg by mouth once daily. , Disp: , Rfl:     fluticasone propionate (FLONASE) 50 mcg/actuation nasal spray, 2 sprays (100 mcg total) by Each Nostril route once daily., Disp: 54.6 mL, Rfl: 1    hydroCHLOROthiazide (HYDRODIURIL) 25 MG tablet, Take 1 tablet (25 mg total) by mouth once daily., Disp: 90 tablet, Rfl: 1    loratadine (CLARITIN) 10 mg tablet, Take 10 mg by mouth daily as needed. , Disp: , Rfl:     multivit with minerals/lutein (MULTIVITAMIN 50 PLUS ORAL), Take by mouth once daily. , Disp: , Rfl:     ofloxacin (OCUFLOX) 0.3 % ophthalmic solution, USE 1 DROP INTO RIGHT EYE FOUR TIMES A DAY AS DIRECTED START DAY BEFORE SURGERY, Disp: , Rfl:     rosuvastatin (CRESTOR) 5 MG tablet, Take 1 tablet (5 mg total) by mouth once daily., Disp: 90 tablet, Rfl: 1    triamcinolone acetonide 0.1% (KENALOG) 0.1 % cream, Apply topically 2 (two) times daily., Disp: 80 g, Rfl: 0    DUREZOL 0.05 % Drop ophthalmic solution, INSTILL 2 DROP INTO RIGHT EYE TWICE A DAY AS DIRECTED START AFTER SURGERY, Disp: , Rfl:     EPINEPHrine (EPIPEN 2-YAA) 0.3 mg/0.3 mL AtIn, as needed. , Disp: , Rfl:     Allergies:  Review of patient's allergies indicates:   Allergen Reactions    Banana Hives    Meperidine Hives and Other (See Comments)     Unknown reaction         Health Maintenance:  Immunization History   Administered Date(s) Administered    Pneumococcal Conjugate - 13 Valent 06/02/2020      Health Maintenance   Topic Date Due    TETANUS VACCINE  06/11/1964    DEXA SCAN  06/11/1986    Pneumococcal Vaccine (65+ Low/Medium Risk) (2 of 2 - PPSV23) 06/02/2021    Mammogram  06/17/2022    Lipid Panel  06/02/2025    Hepatitis C Screening  Completed      Objective:      Vital Signs  Pulse: 65  BP: (!) 141/72  BP Location: (Left arm)  Patient Position:  (sitting)]    Laboratory:    CMP:  Recent Labs   Lab 06/02/20  1117   Glucose 111 H   Calcium 10.1   Albumin 4.5   Total Protein 8.1   Sodium 136   Potassium 3.9   CO2 28   Chloride 100   BUN, Bld 16   Creatinine 1.1   eGFR if  57.6 A   eGFR if non African American 49.9 A   Alkaline Phosphatase 80   ALT 19   AST 28   Total Bilirubin 0.5       URINALYSIS:  Recent Labs   Lab 03/02/20  1553   Color, UA yellow   Spec Grav UA 1.005   pH, UA 7      Recent Labs   Lab 03/02/20  1553   Nitrite, UA negative   Urobilinogen, UA normal        LIPIDS:  Recent Labs   Lab 06/02/20  1117   HDL 53   Cholesterol 149   Triglycerides 83   LDL Cholesterol 79.4   Hdl/Cholesterol Ratio 35.6   Non-HDL Cholesterol 96   Total Cholesterol/HDL Ratio 2.8     Urine Microalbumin/Cr:  Lab Results   Component Value Date    MICALBCREAT 4.8 05/26/2006       Other:   Lab Results   Component Value Date    PUKBECAE77YY 32 09/18/2006     Lab Results   Component Value Date    HEPCAB Negative 06/02/2020       Physical Exam  Constitutional:       General: She is not in acute distress.     Appearance: Normal appearance. She is not toxic-appearing or diaphoretic.   HENT:      Head: Normocephalic and atraumatic.   Pulmonary:      Effort: No respiratory distress.   Neurological:      General: No focal deficit present.      Mental Status: She is alert and oriented to person, place, and time.   Psychiatric:         Mood and Affect: Mood normal.         Behavior: Behavior normal.             Assessment:       1. Preop examination    2. Hypertension, unspecified type    3. Hyperlipidemia, unspecified hyperlipidemia type        Jasmin Alfaro is a 75 y.o.female with:    1. Preop examination  -Pt clear for surgery.  I filled out paperwork.     2. Hypertension, unspecified type  -Controlled  Cont current.      3. Hyperlipidemia, unspecified hyperlipidemia type  -Controlled  Cont current.      Chronic conditions status updated as per HPI.  Other than  changes above, cont current medications and maintain follow up with specialists.  Return to clinic in Dec. as prev sched or sooner if needed.     Gloria Smith MD  [facility]                  Answers for HPI/ROS submitted by the patient on 8/14/2020   activity change: No  unexpected weight change: No  rhinorrhea: No  trouble swallowing: No  visual disturbance: No  chest tightness: No  polyuria: No  difficulty urinating: No  menstrual problem: No  joint swelling: No  arthralgias: No  confusion: No  dysphoric mood: No

## 2020-08-17 ENCOUNTER — OFFICE VISIT (OUTPATIENT)
Dept: URGENT CARE | Facility: CLINIC | Age: 75
End: 2020-08-17
Payer: MEDICARE

## 2020-08-17 VITALS
HEART RATE: 71 BPM | OXYGEN SATURATION: 98 % | RESPIRATION RATE: 17 BRPM | TEMPERATURE: 99 F | BODY MASS INDEX: 32.2 KG/M2 | SYSTOLIC BLOOD PRESSURE: 153 MMHG | WEIGHT: 175 LBS | DIASTOLIC BLOOD PRESSURE: 78 MMHG | HEIGHT: 62 IN

## 2020-08-17 DIAGNOSIS — Z20.822 EXPOSURE TO COVID-19 VIRUS: Primary | ICD-10-CM

## 2020-08-17 PROCEDURE — 99213 PR OFFICE/OUTPT VISIT, EST, LEVL III, 20-29 MIN: ICD-10-PCS | Mod: S$GLB,,, | Performed by: FAMILY MEDICINE

## 2020-08-17 PROCEDURE — U0003 INFECTIOUS AGENT DETECTION BY NUCLEIC ACID (DNA OR RNA); SEVERE ACUTE RESPIRATORY SYNDROME CORONAVIRUS 2 (SARS-COV-2) (CORONAVIRUS DISEASE [COVID-19]), AMPLIFIED PROBE TECHNIQUE, MAKING USE OF HIGH THROUGHPUT TECHNOLOGIES AS DESCRIBED BY CMS-2020-01-R: HCPCS | Mod: HCNC

## 2020-08-17 PROCEDURE — 99213 OFFICE O/P EST LOW 20 MIN: CPT | Mod: S$GLB,,, | Performed by: FAMILY MEDICINE

## 2020-08-17 NOTE — PROGRESS NOTES
"Subjective:       Patient ID: Jasmin Alfaro is a 75 y.o. female.    Vitals:  height is 5' 2" (1.575 m) and weight is 79.4 kg (175 lb). Her temperature is 98.8 °F (37.1 °C). Her blood pressure is 153/78 (abnormal) and her pulse is 71. Her respiration is 17 and oxygen saturation is 98%.     Chief Complaint: Headache, Nausea, and Chills    Patient presents to clinic with chills, bodyaches, headaches since yesterday.  Patient was exposed to her sister who tested positive for COVID 19    Headache   This is a new problem. The current episode started yesterday. The problem occurs constantly. The problem has been unchanged. The pain does not radiate. The quality of the pain is described as aching. The pain is at a severity of 5/10. The pain is moderate. Associated symptoms include nausea. Pertinent negatives include no coughing, ear pain, eye redness, fever, sinus pressure, sore throat or vomiting. Nothing aggravates the symptoms. She has tried nothing for the symptoms.   Nausea  Associated symptoms include chills, headaches, myalgias and nausea. Pertinent negatives include no congestion, coughing, diaphoresis, fatigue, fever, rash, sore throat or vomiting.       Constitution: Positive for chills. Negative for sweating, fatigue and fever.   HENT: Negative for ear pain, congestion, sinus pain, sinus pressure, sore throat and voice change.    Neck: Negative for painful lymph nodes.   Eyes: Negative for eye redness.   Respiratory: Negative for chest tightness, cough, sputum production, bloody sputum, COPD, shortness of breath, stridor, wheezing and asthma.    Gastrointestinal: Positive for nausea. Negative for vomiting.   Musculoskeletal: Positive for muscle ache.   Skin: Negative for rash.   Allergic/Immunologic: Negative for seasonal allergies and asthma.   Neurological: Positive for headaches.   Hematologic/Lymphatic: Negative for swollen lymph nodes.       Objective:      Physical Exam   Constitutional: She is oriented to " person, place, and time. She appears well-developed. She is cooperative.  Non-toxic appearance. She does not appear ill. No distress.   HENT:   Head: Normocephalic and atraumatic.   Ears:   Right Ear: Hearing, tympanic membrane, external ear and ear canal normal.   Left Ear: Hearing, tympanic membrane, external ear and ear canal normal.   Nose: Nose normal. No mucosal edema, rhinorrhea or nasal deformity. No epistaxis. Right sinus exhibits no maxillary sinus tenderness and no frontal sinus tenderness. Left sinus exhibits no maxillary sinus tenderness and no frontal sinus tenderness.   Mouth/Throat: Uvula is midline, oropharynx is clear and moist and mucous membranes are normal. No trismus in the jaw. Normal dentition. No uvula swelling. No oropharyngeal exudate, posterior oropharyngeal edema or posterior oropharyngeal erythema.   Eyes: Conjunctivae and lids are normal. No scleral icterus.   Neck: Trachea normal, full passive range of motion without pain and phonation normal. Neck supple. No neck rigidity. No edema and no erythema present.   Cardiovascular: Normal rate, regular rhythm, normal heart sounds and normal pulses.   Pulmonary/Chest: Effort normal and breath sounds normal. No respiratory distress. She has no decreased breath sounds. She has no rhonchi.   Abdominal: Normal appearance.   Musculoskeletal: Normal range of motion.         General: No deformity.   Neurological: She is alert and oriented to person, place, and time. She exhibits normal muscle tone. Coordination normal.   Skin: Skin is warm, dry, intact, not diaphoretic and not pale. Psychiatric: Her speech is normal and behavior is normal. Judgment and thought content normal.   Nursing note and vitals reviewed.        Assessment:       1. Exposure to Covid-19 Virus        Plan:         Exposure to Covid-19 Virus

## 2020-08-17 NOTE — PATIENT INSTRUCTIONS
Instructions for Patients with Confirmed or Suspected COVID-19    If you are awaiting your test result, you will either be called or it will be released to the patient portal.  If you have any questions about your test, please visit [email or web address] or call our COVID-19 information line at [phone].      Instructions for non-hospitalized or discharged patients with confirmed or suspected COVID-19:       Stay home except to get medical care.    Separate yourself from other people and animals in your home.    Call ahead before visiting your doctor.    Wear a face mask.    Cover your coughs and sneezes.    Clean your hands often.    Avoid sharing personal household items.    Clean all high-touch surfaces every day.    Monitor your symptoms. Seek prompt medical attention if your illness is worsening (e.g., difficulty breathing). Before seeking care, call your healthcare provider.    If you have a medical emergency and must call 911, notify the dispatcher that you have or are being evaluated for COVID-19. If possible, put on a face mask before emergency medical services arrive.    Use the following symptom-based strategy to return to normal activity following a suspected or confirmed case of COVID-19. Continue isolation until:   o At least 3 days (72 hours) have passed since recovery defined as resolution of fever without the use of fever-reducing medications and improvement in respiratory symptoms (e.g. cough, shortness of breath), and   o At least 10 days have passed since the first positive test.       As one of the next steps, you will receive a call or text from the [place] Department of Health ([facility]) COVID-19 Tracing Team. See the contact information below so you know not to ignore the health departments call. It is important that you contact them back immediately so they can help.     Contact Tracer Number:  [phone]  Caller ID for most carriers: [place] [facility]    What is  contact tracing?   Contact tracing is a process that helps identify everyone who has been in close contact with an infected person. Contact tracers let those people know they may have been exposed and guide them on next steps. Confidentiality is important for everyone; no one will be told who may have exposed them to the virus.   Your involvement is important. The more we know about where and how this virus is spreading, the better chance we have at stopping it from spreading further.  What does exposure mean?   Exposure means you have been within 6 feet for more than 15 minutes with a person who has or had COVID-19.  What kind of questions do the contact tracers ask?   A contact tracer will confirm your basic contact information including name, address, phone number, and next of kin, as well as asking about any symptoms you may have had. Theyll also ask you how you think you may have gotten sick, such as places where you may have been exposed to the virus, and people you were with. Those names will never be shared with anyone outside of that call, and will only be used to help trace and stop the spread of the virus.   I have privacy concerns. How will the state use my information?   Your privacy about your health is important. All calls are completed using call centers that use the appropriate health privacy protection measures (HIPAA compliance), meaning that your patient information is safe. No one will ever ask you any questions related to immigration status. Your health comes first.   Do I have to participate?   You do not have to participate, but we strongly encourage you to. Contact tracing can help us catch and control new outbreaks as theyre developing to keep your friends and family safe.   What if I dont hear from anyone?   If you dont receive a call within 24 hours, you can call the number above right away to inquire about your status. That line is open from 8:00 am - 8:00 p.m., 7 days a  week.  Contact tracing saves lives! Together, we have the power to beat this virus and keep our loved ones and neighbors safe.       Instructions for household members, intimate partners and caregivers in a non-healthcare setting of a patient with confirmed or suspected COVID-19:         Close contacts should monitor their health and call their healthcare provider right away if they develop symptoms suggestive of COVID-19 (e.g., fever, cough, shortness of breath).    Stay home except to get medical care. Separate yourself from other people and animals in the home.   Monitor the patients symptoms. If the patient is getting sicker, call his or her healthcare provider. If the patient has a medical emergency and you need to call 911, notify the dispatch personnel that the patient has or is being evaluated for COVID-19.    Wear a facemask when around other people such as sharing a room or vehicle and before entering a healthcare provider's office.   Cover coughs and sneezes with a tissue. Throw used tissues in a lined trash can immediately and wash hands.   Clean hands often with soap and water for at least 20 seconds or with an alcohol-based hand , rubbing hands together until they feel dry. Avoid touching your eyes, nose, and mouth with unwashed hands.   Clean all high-touch; surfaces every day, including counters, tabletops, doorknobs, bathroom fixtures, toilets, phones, keyboards, tablets, bedside tables, etc. Use a household cleaning spray or wipe according to label instructions.   Avoid sharing personal household items such as dishes, drinking glasses, cups, towels, bedding, etc. After these items are used, they should be washed thoroughly with soap and water.   Continue isolation until:   At least 3 days (72 hours) have passed since recovery defined as resolution of fever without the use of fever-reducing medications and improvement in respiratory symptoms (e.g. cough, shortness of breath),  and    At least 10 days have passed since the patients first positive test.    https://www.cdc.gov/coronavirus/2019-ncov/your-health/index.htm

## 2020-08-19 ENCOUNTER — TELEPHONE (OUTPATIENT)
Dept: URGENT CARE | Facility: CLINIC | Age: 75
End: 2020-08-19

## 2020-08-19 LAB — SARS-COV-2 RNA RESP QL NAA+PROBE: NOT DETECTED

## 2020-09-22 ENCOUNTER — TELEPHONE (OUTPATIENT)
Dept: PRIMARY CARE CLINIC | Facility: CLINIC | Age: 75
End: 2020-09-22

## 2020-09-22 DIAGNOSIS — E04.2 MULTIPLE THYROID NODULES: Primary | ICD-10-CM

## 2020-09-22 NOTE — TELEPHONE ENCOUNTER
I will put in referral.  Note - there are no records of her previous Thyroid U/s in [facility].  It would be helpful if we could get records from Dr. Deng Patel, ENT, and Endo - Dr. Robyn Mann for her new Endocrinologist to review.    Dr. GALVAN

## 2020-09-22 NOTE — TELEPHONE ENCOUNTER
Pt will contactDr. Robyn Mann to request they send us a copy of her prev imaging. There is a u/s in epic under media from 2017

## 2020-09-22 NOTE — TELEPHONE ENCOUNTER
Pt is unable to see Dr. Clemente at [facility] due to ins. She would like a referral to a different endo

## 2020-09-22 NOTE — TELEPHONE ENCOUNTER
----- Message from Anne Stovall sent at 9/22/2020 12:31 PM CDT -----  Contact: patient [phone]  Patient called to ask for a referral for an endocrinologist. Patient has a thyroid nodule and needs advice about whether this is what she needs.

## 2020-09-23 ENCOUNTER — PATIENT OUTREACH (OUTPATIENT)
Dept: PRIMARY CARE CLINIC | Facility: CLINIC | Age: 75
End: 2020-09-23

## 2020-09-30 ENCOUNTER — PATIENT OUTREACH (OUTPATIENT)
Dept: ADMINISTRATIVE | Facility: OTHER | Age: 75
End: 2020-09-30

## 2020-10-01 ENCOUNTER — OFFICE VISIT (OUTPATIENT)
Dept: ENDOCRINOLOGY | Facility: CLINIC | Age: 75
End: 2020-10-01
Payer: MEDICARE

## 2020-10-01 ENCOUNTER — LAB VISIT (OUTPATIENT)
Dept: LAB | Facility: HOSPITAL | Age: 75
End: 2020-10-01
Payer: MEDICARE

## 2020-10-01 VITALS
OXYGEN SATURATION: 98 % | DIASTOLIC BLOOD PRESSURE: 64 MMHG | RESPIRATION RATE: 16 BRPM | HEART RATE: 70 BPM | HEIGHT: 62 IN | SYSTOLIC BLOOD PRESSURE: 118 MMHG | WEIGHT: 179.56 LBS | BODY MASS INDEX: 33.04 KG/M2

## 2020-10-01 DIAGNOSIS — E04.2 MULTIPLE THYROID NODULES: ICD-10-CM

## 2020-10-01 DIAGNOSIS — R73.03 PREDIABETES: ICD-10-CM

## 2020-10-01 DIAGNOSIS — E78.5 HYPERLIPIDEMIA, UNSPECIFIED HYPERLIPIDEMIA TYPE: ICD-10-CM

## 2020-10-01 DIAGNOSIS — E04.2 MULTIPLE THYROID NODULES: Primary | ICD-10-CM

## 2020-10-01 DIAGNOSIS — M85.80 OSTEOPENIA, UNSPECIFIED LOCATION: ICD-10-CM

## 2020-10-01 DIAGNOSIS — I10 ESSENTIAL HYPERTENSION: ICD-10-CM

## 2020-10-01 PROBLEM — Z78.0 POSTMENOPAUSAL STATE: Status: ACTIVE | Noted: 2020-10-01

## 2020-10-01 PROBLEM — Z78.0 POSTMENOPAUSAL STATE: Status: RESOLVED | Noted: 2020-10-01 | Resolved: 2020-10-01

## 2020-10-01 LAB
25(OH)D3+25(OH)D2 SERPL-MCNC: 37 NG/ML (ref 30–96)
ESTIMATED AVG GLUCOSE: 131 MG/DL (ref 68–131)
HBA1C MFR BLD HPLC: 6.2 % (ref 4–5.6)
TSH SERPL DL<=0.005 MIU/L-ACNC: 1.55 UIU/ML (ref 0.4–4)

## 2020-10-01 PROCEDURE — 84443 ASSAY THYROID STIM HORMONE: CPT | Mod: HCNC

## 2020-10-01 PROCEDURE — 1126F PR PAIN SEVERITY QUANTIFIED, NO PAIN PRESENT: ICD-10-PCS | Mod: HCNC,S$GLB,, | Performed by: INTERNAL MEDICINE

## 2020-10-01 PROCEDURE — 36415 COLL VENOUS BLD VENIPUNCTURE: CPT | Mod: HCNC

## 2020-10-01 PROCEDURE — 1159F PR MEDICATION LIST DOCUMENTED IN MEDICAL RECORD: ICD-10-PCS | Mod: HCNC,S$GLB,, | Performed by: INTERNAL MEDICINE

## 2020-10-01 PROCEDURE — 3074F PR MOST RECENT SYSTOLIC BLOOD PRESSURE < 130 MM HG: ICD-10-PCS | Mod: HCNC,CPTII,S$GLB, | Performed by: INTERNAL MEDICINE

## 2020-10-01 PROCEDURE — 1101F PT FALLS ASSESS-DOCD LE1/YR: CPT | Mod: HCNC,CPTII,S$GLB, | Performed by: INTERNAL MEDICINE

## 2020-10-01 PROCEDURE — 3008F BODY MASS INDEX DOCD: CPT | Mod: HCNC,CPTII,S$GLB, | Performed by: INTERNAL MEDICINE

## 2020-10-01 PROCEDURE — 99204 OFFICE O/P NEW MOD 45 MIN: CPT | Mod: HCNC,S$GLB,, | Performed by: INTERNAL MEDICINE

## 2020-10-01 PROCEDURE — 3078F PR MOST RECENT DIASTOLIC BLOOD PRESSURE < 80 MM HG: ICD-10-PCS | Mod: HCNC,CPTII,S$GLB, | Performed by: INTERNAL MEDICINE

## 2020-10-01 PROCEDURE — 1126F AMNT PAIN NOTED NONE PRSNT: CPT | Mod: HCNC,S$GLB,, | Performed by: INTERNAL MEDICINE

## 2020-10-01 PROCEDURE — 3008F PR BODY MASS INDEX (BMI) DOCUMENTED: ICD-10-PCS | Mod: HCNC,CPTII,S$GLB, | Performed by: INTERNAL MEDICINE

## 2020-10-01 PROCEDURE — 83036 HEMOGLOBIN GLYCOSYLATED A1C: CPT | Mod: HCNC

## 2020-10-01 PROCEDURE — 3078F DIAST BP <80 MM HG: CPT | Mod: HCNC,CPTII,S$GLB, | Performed by: INTERNAL MEDICINE

## 2020-10-01 PROCEDURE — 99999 PR PBB SHADOW E&M-EST. PATIENT-LVL V: CPT | Mod: PBBFAC,HCNC,, | Performed by: INTERNAL MEDICINE

## 2020-10-01 PROCEDURE — 82306 VITAMIN D 25 HYDROXY: CPT | Mod: HCNC

## 2020-10-01 PROCEDURE — 3074F SYST BP LT 130 MM HG: CPT | Mod: HCNC,CPTII,S$GLB, | Performed by: INTERNAL MEDICINE

## 2020-10-01 PROCEDURE — 1101F PR PT FALLS ASSESS DOC 0-1 FALLS W/OUT INJ PAST YR: ICD-10-PCS | Mod: HCNC,CPTII,S$GLB, | Performed by: INTERNAL MEDICINE

## 2020-10-01 PROCEDURE — 99999 PR PBB SHADOW E&M-EST. PATIENT-LVL V: ICD-10-PCS | Mod: PBBFAC,HCNC,, | Performed by: INTERNAL MEDICINE

## 2020-10-01 PROCEDURE — 1159F MED LIST DOCD IN RCRD: CPT | Mod: HCNC,S$GLB,, | Performed by: INTERNAL MEDICINE

## 2020-10-01 PROCEDURE — 99204 PR OFFICE/OUTPT VISIT, NEW, LEVL IV, 45-59 MIN: ICD-10-PCS | Mod: HCNC,S$GLB,, | Performed by: INTERNAL MEDICINE

## 2020-10-01 RX ORDER — SODIUM, POTASSIUM,MAG SULFATES 17.5-3.13G
SOLUTION, RECONSTITUTED, ORAL ORAL
COMMUNITY
Start: 2020-09-08 | End: 2021-05-10

## 2020-10-01 NOTE — PROGRESS NOTES
Subjective:      Patient ID: Jasmin Alfaro is a 75 y.o. female.    Chief Complaint:  Thyroid nodules    History of Present Illness  This is a 75 y.o. female. with a past medical history of HTN, hyperlipidemia, anxiety referred by Dr. Gloria Smith for evaluation of thyroid nodules. She used to follow with Dr. Rachel Bolanos but is no longer following due to insurance.    With regards to thyroid nodules  Diagnosed in 2017 based on physical exam  Reviewed outside records with following ultrasounds reports (see media tab for full reports)    Thyroid US September 2017:  Right lobe measures 4.2 x 1.4 x 1.5 cm with numerous 2-3 mm cysts and hyperechoic nodules  There is one 1.1 x 0.7 x 1.3 cm complex nodule with grade 2 vascularity without calcifications  Left lobe measures 4.1 x 1.4 x 1.5 cm with multiple cysts    Thyroid US October 2018:  No significant change   Dominant R lobe nodule measured 1.2 x 0.7 x 1.4 cm, unchanged    Thyroid US September 2019:  No significant change  Dominant R lobe nodule 1.1 x 0.8 x 1.4 cm, unchanged, described as colloid cyst      No family history of thyroid cancer or head/neck irradiation. No hypothyroidism or Hashimoto's prior.    Sister is on Synthroid for hypothyrodism  Mother had goiter, unsure about workup  Thyroid FNA of R dominant nodule around 2017 benign as per ultrasound report    Denies weight changes, heat/cold intolerance, diarrhea/constipation, dry skin, excessive hair loss, palpitations, tremors or overt fatigue.    No TSH in records obtained, as per patient no abnormalities in TSH in the past    With regards to osteopenia  Takes OTC Caltrate + vitamin D unsure of dose (think 1,000 UI)  Reports DXA in 2017 osteopenia (not on records)  History of reported shoulder fracture in 2018 had a misstep when carrying something and fell from standing height - was on a sling only  [place] but no issues with falls or balance    With regards to  "preDM  Noted A1c 6% in 2007 in our records  Reports being on medication but was stopped many years ago  Reports stale A1cs with outside provider  Reports has gained some weight during pandemic        Review of Systems   Constitutional: Negative for unexpected weight change.   Eyes: Negative for visual disturbance.   Respiratory: Negative for shortness of breath.    Cardiovascular: Negative for chest pain.   Gastrointestinal: Negative for abdominal pain.   Musculoskeletal: Negative for arthralgias.   Skin: Negative for wound.   Allergic/Immunologic: Negative for food allergies.   Neurological: Negative for headaches.   Hematological: Does not bruise/bleed easily.       Social and family history reviewed  Current medications and allergies reviewed    Objective:   /64   Pulse 70   Resp 16   Ht 5' 2" (1.575 m)   Wt 81.4 kg (179 lb 9 oz)   SpO2 98%   BMI 32.84 kg/m²   Physical Exam  Constitutional:       Appearance: She is well-developed.   HENT:      Head: Normocephalic and atraumatic.   Neck:      Musculoskeletal: Neck supple.      Thyroid: No thyromegaly.   Cardiovascular:      Rate and Rhythm: Normal rate and regular rhythm.      Heart sounds: Normal heart sounds.   Pulmonary:      Effort: Pulmonary effort is normal. No respiratory distress.   Abdominal:      Palpations: Abdomen is soft.      Tenderness: There is no abdominal tenderness.   Skin:     General: Skin is warm.      Findings: No rash.   Neurological:      Mental Status: She is alert and oriented to person, place, and time.   Psychiatric:         Behavior: Behavior normal.       BP Readings from Last 1 Encounters:   10/01/20 118/64      Wt Readings from Last 1 Encounters:   10/01/20 0818 81.4 kg (179 lb 9 oz)     Body mass index is 32.84 kg/m².    Lab Review:   Lab Results   Component Value Date    HGBA1C 6.0 05/01/2007     Lab Results   Component Value Date    CHOL 149 06/02/2020    HDL 53 06/02/2020    LDLCALC 79.4 06/02/2020    TRIG 83 " 06/02/2020    CHOLHDL 35.6 06/02/2020     Lab Results   Component Value Date     06/02/2020    K 3.9 06/02/2020     06/02/2020    CO2 28 06/02/2020     (H) 06/02/2020    BUN 16 06/02/2020    CREATININE 1.1 06/02/2020    CALCIUM 10.1 06/02/2020    PROT 8.1 06/02/2020    ALBUMIN 4.5 06/02/2020    BILITOT 0.5 06/02/2020    ALKPHOS 80 06/02/2020    AST 28 06/02/2020    ALT 19 06/02/2020    ANIONGAP 8 06/02/2020    ESTGFRAFRICA 57.6 (A) 06/02/2020    EGFRNONAA 49.9 (A) 06/02/2020    TSH 2.3 08/22/2007       All pertinent labs reviewed    Assessment and Plan     Multiple thyroid nodules  Noted to have multiple subcentimeter nodules with one nodule that is 1.4 cm which has been followed yearly since 2017 with documented stability in size and appearance. There are reports of a benign FNA around 2017.    Discussed indications for a FNA  Discussed possible FNA results (benign, FLUS,  follicular or hurthle lesion, suspicious for cancer, cancer and non diagnostic)     Reviewed that a non diagnostic or FLUS would require a repeat FNA     Will proceed with repeat ultrasound    Discussed indications for repeat FNA as well as surgical indications (abnormal FNA, compressive symptoms or interval change)     If FNA is negative then plan follow up in 1 year with TSH and thyroid u/s prior    Hyperlipidemia  LDL at goal continue statin    Postmenopausal state  Will obtain screening DXA  Check vit D levels    Osteopenia  History of osteopenia with reported DXA around 5660-0669  History of R shoulder fracture unsure if truly a fragility fracture as she did not require surgical intervention  Will recheck DXA  Continue OTC + vitamin D    Plan  - Obtain DXA spine and hip  - Vitamin D levels    Prediabetes  History of prediabetes currently not on medication  Reports some weigh gain during pandemic  Will recheck A1c    Hypertension  BP at goal  Continue HCTZ, atenolol, amlodipine    Postmenopausal state  DXA as  above      Ki Gavin MD  Endocrinology Fellow      I, Mila Romero MD,  have personally taken the history and examined the patient and agree with the resident's note as stated above.

## 2020-10-01 NOTE — LETTER
October 1, 2020      Gloria Smith MD  [address] [facility] [address]           [facility] [address]  [address]  [facility] [address]  Phone: [phone]  Fax: [phone]          Patient: Jasmin Alfaro   MR Number: [id number]   YOB: 1945   Date of Visit: 10/1/2020       Dear Dr. Gloria Smith:    Thank you for referring Jasmin Alfaro to me for evaluation. Attached you will find relevant portions of my assessment and plan of care.    If you have questions, please do not hesitate to call me. I look forward to following Jasmin Alfaro along with you.    Sincerely,    Mila Romero MD    Enclosure  CC:  No Recipients    If you would like to receive this communication electronically, please contact [email or web address] or [phone] to request more information on [facility] [facility] access.    For providers and/or their staff who would like to refer a patient to [facility], please contact us through our one-stop-shop provider referral line, [facility], at [phone].    If you feel you have received this communication in error or would no longer like to receive these types of communications, please e-mail [email or web address]

## 2020-10-01 NOTE — ASSESSMENT & PLAN NOTE
History of osteopenia with reported DXA around 4560-8750  History of R shoulder fracture unsure if truly a fragility fracture as she did not require surgical intervention  Will recheck DXA  Continue OTC + vitamin D    Plan  - Obtain DXA spine and hip  - Vitamin D levels

## 2020-10-01 NOTE — ASSESSMENT & PLAN NOTE
History of prediabetes currently not on medication  Reports some weigh gain during pandemic  Will recheck A1c

## 2020-10-01 NOTE — PROGRESS NOTES
Care Everywhere: updated  Immunization: updated  Health Maintenance: updated  Media Review: review for outside colon cancer report   Legacy Review:   Order placed:   Upcoming appts:  Referral to gastroenterology 8/14/2020

## 2020-10-01 NOTE — ASSESSMENT & PLAN NOTE
Noted to have multiple subcentimeter nodules with one nodule that is 1.4 cm which has been followed yearly since 2017 with documented stability in size and appearance. There are reports of a benign FNA around 2017.    Discussed indications for a FNA  Discussed possible FNA results (benign, FLUS,  follicular or hurthle lesion, suspicious for cancer, cancer and non diagnostic)     Reviewed that a non diagnostic or FLUS would require a repeat FNA     Will proceed with repeat ultrasound    Discussed indications for repeat FNA as well as surgical indications (abnormal FNA, compressive symptoms or interval change)     If FNA is negative then plan follow up in 1 year with TSH and thyroid u/s prior

## 2020-10-15 ENCOUNTER — PATIENT OUTREACH (OUTPATIENT)
Dept: ADMINISTRATIVE | Facility: HOSPITAL | Age: 75
End: 2020-10-15

## 2020-11-13 ENCOUNTER — TELEPHONE (OUTPATIENT)
Dept: PRIMARY CARE CLINIC | Facility: CLINIC | Age: 75
End: 2020-11-13

## 2020-11-13 ENCOUNTER — HOSPITAL ENCOUNTER (OUTPATIENT)
Dept: ENDOCRINOLOGY | Facility: CLINIC | Age: 75
Discharge: HOME | End: 2020-11-13
Attending: STUDENT IN AN ORGANIZED HEALTH CARE EDUCATION/TRAINING PROGRAM
Payer: MEDICARE

## 2020-11-13 ENCOUNTER — DOCUMENTATION ONLY (OUTPATIENT)
Dept: OTHER | Facility: OTHER | Age: 75
End: 2020-11-13

## 2020-11-13 ENCOUNTER — HOSPITAL ENCOUNTER (OUTPATIENT)
Dept: RADIOLOGY | Facility: CLINIC | Age: 75
Discharge: HOME | End: 2020-11-13
Attending: STUDENT IN AN ORGANIZED HEALTH CARE EDUCATION/TRAINING PROGRAM
Payer: MEDICARE

## 2020-11-13 DIAGNOSIS — K21.9 GASTROESOPHAGEAL REFLUX DISEASE, UNSPECIFIED WHETHER ESOPHAGITIS PRESENT: Primary | ICD-10-CM

## 2020-11-13 DIAGNOSIS — M85.80 OSTEOPENIA, UNSPECIFIED LOCATION: ICD-10-CM

## 2020-11-13 DIAGNOSIS — E04.2 MULTIPLE THYROID NODULES: ICD-10-CM

## 2020-11-13 PROCEDURE — 76536 US EXAM OF HEAD AND NECK: CPT | Mod: HCNC,S$GLB,, | Performed by: INTERNAL MEDICINE

## 2020-11-13 PROCEDURE — 77080 DXA BONE DENSITY AXIAL: CPT | Mod: 26,HCNC,, | Performed by: INTERNAL MEDICINE

## 2020-11-13 PROCEDURE — 76536 US SOFT TISSUE HEAD NECK THYROID: ICD-10-PCS | Mod: HCNC,S$GLB,, | Performed by: INTERNAL MEDICINE

## 2020-11-13 PROCEDURE — 77080 DXA BONE DENSITY AXIAL: CPT | Mod: TC,HCNC

## 2020-11-13 PROCEDURE — 77080 DEXA BONE DENSITY SPINE HIP: ICD-10-PCS | Mod: 26,HCNC,, | Performed by: INTERNAL MEDICINE

## 2020-11-13 RX ORDER — FAMOTIDINE 40 MG/1
40 TABLET, FILM COATED ORAL DAILY
Qty: 30 TABLET | Refills: 1 | Status: SHIPPED | OUTPATIENT
Start: 2020-11-13 | End: 2020-12-06

## 2020-11-13 NOTE — TELEPHONE ENCOUNTER
I sent a Rx for Famotidine which she can take as needed.  She should alert me if this does not help.    Dr. GALVAN

## 2020-11-13 NOTE — TELEPHONE ENCOUNTER
----- Message from Elena Rawls sent at 11/13/2020 12:44 PM CST -----  Contact: [phone]  .1 Patient would like to get medical advice.  Symptoms (please be specific): Acid refulx  How long has patient had these symptoms:  Pharmacy name and phone#:[id number] - [place] - [address] [phone] (Phone) [phone] (Fax)  Any drug allergies: onfile  Comments: Patient would like to get medical advice. Patient would like something else beside the omeprazole

## 2020-11-13 NOTE — TELEPHONE ENCOUNTER
LOV 8/14/2020  I sw pt. She has been having more acid reflux. In the past she was taking Omeprazole prn. She would like to try something else. Pt stopped taking it b/c she was told it causes dementia.

## 2020-11-25 ENCOUNTER — OFFICE VISIT (OUTPATIENT)
Dept: URGENT CARE | Facility: CLINIC | Age: 75
End: 2020-11-25
Payer: MEDICARE

## 2020-11-25 VITALS
TEMPERATURE: 98 F | BODY MASS INDEX: 32.94 KG/M2 | SYSTOLIC BLOOD PRESSURE: 160 MMHG | DIASTOLIC BLOOD PRESSURE: 89 MMHG | RESPIRATION RATE: 18 BRPM | WEIGHT: 179 LBS | OXYGEN SATURATION: 98 % | HEIGHT: 62 IN | HEART RATE: 86 BPM

## 2020-11-25 DIAGNOSIS — Z11.59 SCREENING FOR VIRAL DISEASE: ICD-10-CM

## 2020-11-25 DIAGNOSIS — R09.82 PND (POST-NASAL DRIP): ICD-10-CM

## 2020-11-25 DIAGNOSIS — R05.8 DRY COUGH: ICD-10-CM

## 2020-11-25 DIAGNOSIS — U07.1 COVID-19 VIRUS INFECTION: Primary | ICD-10-CM

## 2020-11-25 DIAGNOSIS — U07.1 COVID-19 VIRUS DETECTED: ICD-10-CM

## 2020-11-25 LAB
CTP QC/QA: YES
SARS-COV-2 RDRP RESP QL NAA+PROBE: POSITIVE

## 2020-11-25 PROCEDURE — 1125F AMNT PAIN NOTED PAIN PRSNT: CPT | Mod: S$GLB,,, | Performed by: PHYSICIAN ASSISTANT

## 2020-11-25 PROCEDURE — U0002: ICD-10-PCS | Mod: QW,S$GLB,, | Performed by: PHYSICIAN ASSISTANT

## 2020-11-25 PROCEDURE — 3008F PR BODY MASS INDEX (BMI) DOCUMENTED: ICD-10-PCS | Mod: CPTII,S$GLB,, | Performed by: PHYSICIAN ASSISTANT

## 2020-11-25 PROCEDURE — U0002 COVID-19 LAB TEST NON-CDC: HCPCS | Mod: QW,S$GLB,, | Performed by: PHYSICIAN ASSISTANT

## 2020-11-25 PROCEDURE — 3008F BODY MASS INDEX DOCD: CPT | Mod: CPTII,S$GLB,, | Performed by: PHYSICIAN ASSISTANT

## 2020-11-25 PROCEDURE — 1125F PR PAIN SEVERITY QUANTIFIED, PAIN PRESENT: ICD-10-PCS | Mod: S$GLB,,, | Performed by: PHYSICIAN ASSISTANT

## 2020-11-25 PROCEDURE — 99214 OFFICE O/P EST MOD 30 MIN: CPT | Mod: S$GLB,,, | Performed by: PHYSICIAN ASSISTANT

## 2020-11-25 PROCEDURE — 99214 PR OFFICE/OUTPT VISIT, EST, LEVL IV, 30-39 MIN: ICD-10-PCS | Mod: S$GLB,,, | Performed by: PHYSICIAN ASSISTANT

## 2020-11-25 RX ORDER — IPRATROPIUM BROMIDE 21 UG/1
2 SPRAY, METERED NASAL 2 TIMES DAILY PRN
Qty: 30 ML | Refills: 0 | Status: SHIPPED | OUTPATIENT
Start: 2020-11-25 | End: 2021-07-14

## 2020-11-25 NOTE — PROGRESS NOTES
"Subjective:       Patient ID: Jasmin Alfaro is a 75 y.o. female.    Vitals:  height is 5' 2" (1.575 m) and weight is 81.2 kg (179 lb). Her oral temperature is 98.3 °F (36.8 °C). Her blood pressure is 160/89 (abnormal) and her pulse is 86. Her respiration is 18 and oxygen saturation is 98%.     Chief Complaint: URI      75-year-old female with a history of hypertension, hyperlipidemia, anxiety, allergic rhinitis, osteopenia, and status post appendectomy presents urgent care clinic for evaluation.  Complaining started generalized fatigue, nasal /sinus congestion, sore throat, and dry cough.  Cough and postnasal drip worse when she is laying down.  She has been taking Tylenol and using Flonase daily for.  No other associated symptoms.  No direct COVID-19 exposure.      Constitution: Positive for fatigue. Negative for activity change, appetite change, chills, sweating, fever and generalized weakness.   HENT: Positive for congestion, postnasal drip, sinus pressure and sore throat. Negative for ear pain, hearing loss, facial swelling, sinus pain, trouble swallowing and voice change.    Neck: Negative for neck pain, neck stiffness and painful lymph nodes.   Cardiovascular: Negative for chest pain, leg swelling, palpitations, sob on exertion and passing out.   Eyes: Negative for eye discharge, eye pain, eye redness, photophobia, vision loss, double vision and blurred vision.   Respiratory: Positive for cough. Negative for chest tightness, sputum production, bloody sputum, COPD, shortness of breath, stridor, wheezing and asthma.    Gastrointestinal: Negative for abdominal pain, nausea, vomiting, constipation, diarrhea, bright red blood in stool, rectal bleeding, heartburn and bowel incontinence.   Genitourinary: Negative for dysuria, frequency, urgency, urine decreased, flank pain, bladder incontinence and hematuria.   Musculoskeletal: Negative for trauma, joint pain, joint swelling, abnormal ROM of joint, muscle cramps and " muscle ache.   Skin: Negative for color change, pale, rash and wound.   Allergic/Immunologic: Negative for seasonal allergies, asthma and immunocompromised state.   Neurological: Negative for dizziness, history of vertigo, light-headedness, passing out, facial drooping, speech difficulty, coordination disturbances, loss of balance, headaches, disorientation, altered mental status, loss of consciousness, numbness, tingling and seizures.   Hematologic/Lymphatic: Negative for swollen lymph nodes, easy bruising/bleeding and trouble clotting. Does not bruise/bleed easily.   Psychiatric/Behavioral: Negative for altered mental status and disorientation.       Objective:      Physical Exam   Constitutional: She is oriented to person, place, and time. She appears well-developed. She is cooperative.  Non-toxic appearance. She does not appear ill. No distress.   HENT:   Head: Normocephalic and atraumatic.   Ears:   Right Ear: Hearing, external ear and ear canal normal. No drainage, swelling or tenderness.   Left Ear: Hearing, external ear and ear canal normal. No drainage, swelling or tenderness.   Nose: Congestion present. No rhinorrhea or purulent discharge. Right sinus exhibits no maxillary sinus tenderness and no frontal sinus tenderness. Left sinus exhibits no maxillary sinus tenderness and no frontal sinus tenderness.   Mouth/Throat: Uvula is midline, oropharynx is clear and moist and mucous membranes are normal. Mucous membranes are moist. No oral lesions. No trismus in the jaw. No uvula swelling. No oropharyngeal exudate, posterior oropharyngeal edema or posterior oropharyngeal erythema. No tonsillar exudate. Oropharynx is clear.   Eyes: Pupils are equal, round, and reactive to light. Conjunctivae, EOM and lids are normal. Right eye exhibits no discharge. Left eye exhibits no discharge. No visual field deficit is present. Right conjunctiva is not injected. Right conjunctiva has no hemorrhage. Left conjunctiva is not  injected. Left conjunctiva has no hemorrhage. extraocular movement intactvision grossly intactgaze aligned appropriately  Neck: Normal range of motion and full passive range of motion without pain. Neck supple. No neck rigidity.   Cardiovascular: Normal rate, regular rhythm, normal heart sounds and normal pulses.   No murmur heard.  Pulmonary/Chest: Effort normal and breath sounds normal. No accessory muscle usage or stridor. No respiratory distress. She has no wheezes. She exhibits no tenderness.   Abdominal: Soft. Normal appearance. She exhibits no distension and no mass. There is no splenomegaly or hepatomegaly. There is no abdominal tenderness. There is no rebound, no guarding, no tenderness at McBurney's point, negative Evans's sign, no left CVA tenderness, negative Rovsing's sign and no right CVA tenderness.   Musculoskeletal: Normal range of motion.      Right lower leg: No edema.      Left lower leg: No edema.      Comments: Moves all extremities with normal tone, strength, and ROM.  Gait normal.   Lymphadenopathy:     She has no cervical adenopathy.   Neurological: She is alert and oriented to person, place, and time. She has normal motor skills, normal sensation and intact cranial nerves. She displays no weakness, facial symmetry and normal reflexes. No cranial nerve deficit or sensory deficit. She exhibits normal muscle tone. She has a normal Finger-Nose-Finger Test. She shows no pronator drift. She displays no seizure activity. Gait and coordination normal. Coordination normal. GCS eye subscore is 4. GCS verbal subscore is 5. GCS motor subscore is 6.   Skin: Skin is warm, dry, not diaphoretic and no rash. Capillary refill takes less than 2 seconds. Psychiatric: Her speech is normal and behavior is normal. Mood and thought content normal.   Nursing note and vitals reviewed.    Results for orders placed or performed in visit on 11/25/20   POCT COVID-19 Rapid Screening   Result Value Ref Range    POC Rapid  COVID Positive (A) Negative     Acceptable Yes              Assessment:       1. COVID-19 virus infection    2. PND (post-nasal drip)    3. Dry cough    4. Screening for viral disease        Nontoxic appearing. Vitals are stable. Patient presents for COVID nasal swab testing. Patient is concerned for possible exposure. Rapid covid +   All diagnostic testing personally reviewed and interpreted.       Patient was recommended OTC treatments for their symptoms. Patient was also prescribed medications for their symptoms.     Patient was counseled, explained with the test results meaning, expected COVID course, and answered all of questions. They can also receive results via my chart.  Printed and verbal COVID guidelines were given. Patient understands that they received an Urgent Care treatment only and that they may be released before all your medical problems are known or treated. Strict ED versus clinic precautions given.  Patient verbalized understanding and agreed with plan of care.    Note dictated with voice recognition software, please excuse any grammatical errors.    Plan:         COVID-19 virus infection  -     POCT COVID-19 Rapid Screening    PND (post-nasal drip)  -     ipratropium (ATROVENT) 0.03 % nasal spray; 2 sprays by Nasal route 2 (two) times daily as needed for Rhinitis.  Dispense: 30 mL; Refill: 0    Dry cough    Screening for viral disease  -     POCT COVID-19 Rapid Screening           Patient Instructions     PLEASE READ YOUR DISCHARGE INSTRUCTIONS ENTIRELY AS IT CONTAINS IMPORTANT INFORMATION.    Patient had covid testing done today.      If Positive: improved symptoms, no fever without fever reducing meds for 24 hours- have to be out for a minimum of 10 days passed from when symptoms first appeared with improvements in respiratory symptoms.      Discussed corona virus precautions and reviewed CDC FAC; printed a copy for patient.  I discussed to continue to monitor their symptoms.  Discussed that if their symptoms persist or worsen to seek re-evaluation. Clinic vs. ER precautions were given.  Patient verbalized understanding and agreed with the above plan of care.    - Rest.  Drink plenty of fluids.    - Tylenol as directed as needed for fever/pain.  For Tylenol, do not exceed 4000 mg/ day. If no contraindication.    -Below are suggestions for symptomatic relief:              -Salt water gargles to soothe throat pain.              -Chloroseptic spray also helps to numb throat pain.              -Nasal saline spray reduces inflammation and dryness.              -Warm face compresses to help with facial sinus pain/pressure.              -Vicks vapor rub at night.              -ATROVENT NASAL SPRAY for nasal congestion               -Flonase OTC or Nasacort OTC for nasal congestion.              -Simple foods like chicken noodle soup.              -Mucinex for cough during the day time. Delsym helps with coughing at night. Mucinex-D if you have chest congestion or sputum (caution if history of high blood pressure or palpitations).              -Zyrtec/Claritin during the day & Benadryl at night may help with allergies.  -If you DO NOT have Hypertension or any history of palpitations, it is ok to take over the counter Sudafed or Mucinex D or Allegra-D or Claritin-D or Zyrtec-D.  -If you do take one of the above, it is ok to combine that with plain over the counter Mucinex or Allegra or Claritin or Zyrtec. If, for example, you are taking Zyrtec -D, you can combine that with Mucinex, but not Mucinex-D.  If you are taking Mucinex-D, you can combine that with plain Allegra or Claritin or Zyrtec.   -If you DO have Hypertension or palpitations, it is safe to take Coricidin HBP for relief of sinus symptoms.      For your GI symptoms:  -  -Use gatorade/pedialyte or rehydration packets to help stay hydrated. Vitamin water and plain water do not contain rehydrating electrolytes.  -Increase clear liquids  (water, gatorade, pedialyte, broths, jello, etc) Hold off on solids for 12-18 hours. Then advance to BRAT diet (banana, rice, applesauce, tea, toast/crackers), then advance further as tolerated. Avoid spicy or fatty foods.   -Use Peptobismol or Immodium to help alleviate your diarrhea symptoms.   -Avoid imodium unless you have more than 6 loose stools in 24 hours.   -Wash hands frequently while sick. Avoid ibuprofen or other NSAIDS until you are well.   -Please go to the ER if you experience worsening pain, blood in your vomit or stool, high fever, dizziness, fainting, swelling of your abdomen, inability to pass gas or stool.         -You must understand that you've received an Urgent Care treatment only and that you may be released before all your medical problems are known or treated. You, the patient, will arrange for follow up care as instructed. Please arrange follow up with your primary medical clinic within 2-5 days if your signs and symptoms have not resolved or worsen.   - Follow up with your PCP or specialty clinic as directed.  You can call [phone] or [phone] to schedule an appointment with the appropriate provider.    - If your condition worsens or fails to improve we recommend that you receive another evaluation at the emergency room immediately or contact your primary medical clinic to discuss your concerns.                Instructions for Patients Awaiting COVID-19 Test Results    You will either be called with your test result or it will be released to the patient portal.  If you have any questions about your test, please visit [email or web address] or call our COVID-19 information line at [phone].    Prevention steps for patients with confirmed or suspected COVID-19       Stay home and stay away from family members and friends. The CDC says, you can leave home after these three things have happened: 1) You have had no fever for at least 24 hours (that is one full day of no  fever without the use of medicine that reduces fevers) 2) AND other symptoms have improved (for example, when your cough or shortness of breath have improved) 3) AND at least 10 days have passed since your symptoms first appeared.   Separate yourself from other people and animals in your home.   Call ahead before visiting your doctor.   Wear a facemask.   Cover your coughs and sneezes.   Wash your hands often with soap and water; hand  can be used, too.   Avoid sharing personal household items.   Wipe down surfaces used daily.   Monitor your symptoms. Seek prompt medical attention if your illness is worsening (e.g., difficulty breathing).    Before seeking care, call your healthcare provider.   If you have a medical emergency and need to call 911, notify the dispatch personnel that you have, or are being evaluated for COVID-19. If possible, put on a facemask before emergency medical services arrive.        Recommended precautions for household members, intimate partners, and caregivers in a home setting of a patient with symptomatic laboratory-confirmed COVID-19 or a patient under investigation.  Household members, intimate partners, and caregivers in the home setting awaiting tests results have close contact with a person with symptomatic, laboratory-confirmed COVID-19 or a person under investigation. Close contacts should monitor their health; they should call their provider right away if they develop symptoms suggestive of COVID-19 (e.g., fever, cough, shortness of breath).    Close contacts should also follow these recommendations:   Make sure that you understand and can help the patient follow their provider's instructions for medication(s) and care. You should help the patient with basic needs in the home and provide support for getting groceries, prescriptions, and other personal needs.   Monitor the patient's symptoms. If the patient is getting sicker, call his or her healthcare provider  and tell them that the patient has laboratory-confirmed COVID-19. If the patient has a medical emergency and you need to call 911, notify the dispatch personnel that the patient has, or is being evaluated for COVID-19.   Household members should stay in another room or be  from the patient. Household members should use a separate bedroom and bathroom, if available.   Prohibit visitors.   Household members should care for any pets in the home.   Make sure that shared spaces in the home have good air flow, such as by an air conditioner or an opened window, weather permitting.   Perform hand hygiene frequently. Wash your hands often with soap and water for at least 20 seconds or use an alcohol-based hand  (that contains > 60% alcohol) covering all surfaces of your hands and rubbing them together until they feel dry. Soap and water should be used preferentially.   Avoid touching your eyes, nose, and mouth.   The patient should wear a facemask. If the patient is not able to wear a facemask (for example, because it causes trouble breathing), caregivers should wear a mask when they are in the same room as the patient.   Wear a disposable facemask and gloves when you touch or have contact with the patient's blood, stool, or body fluids, such as saliva, sputum, nasal mucus, vomit, urine.  o Throw out disposable facemasks and gloves after using them. Do not reuse.  o When removing personal protective equipment, first remove and dispose of gloves. Then, immediately clean your hands with soap and water or alcohol-based hand . Next, remove and dispose of facemask, and immediately clean your hands again with soap and water or alcohol-based hand .   You should not share dishes, drinking glasses, cups, eating utensils, towels, bedding, or other items with the patient. After the patient uses these items, you should wash them thoroughly (see below Wash laundry thoroughly).   Clean all  high-touch surfaces, such as counters, tabletops, doorknobs, bathroom fixtures, toilets, phones, keyboards, tablets, and bedside tables, every day. Also, clean any surfaces that may have blood, stool, or body fluids on them.   Use a household cleaning spray or wipe, according to the label instructions. Labels contain instructions for safe and effective use of the cleaning product including precautions you should take when applying the product, such as wearing gloves and making sure you have good ventilation during use of the product.   Wash laundry thoroughly.  o Immediately remove and wash clothes or bedding that have blood, stool, or body fluids on them.  o Wear disposable gloves while handling soiled items and keep soiled items away from your body. Clean your hands (with soap and water or an alcohol-based hand ) immediately after removing your gloves.  o Read and follow directions on labels of laundry or clothing items and detergent. In general, using a normal laundry detergent according to washing machine instructions and dry thoroughly using the warmest temperatures recommended on the clothing label.   Place all used disposable gloves, facemasks, and other contaminated items in a lined container before disposing of them with other household waste. Clean your hands (with soap and water or an alcohol-based hand ) immediately after handling these items. Soap and water should be used preferentially if hands are visibly dirty.   Discuss any additional questions with your state or local health department or healthcare provider. Check available hours when contacting your local health department.    For more information see CDC link below.      https://www.cdc.gov/coronavirus/2019-ncov/hcp/guidance-prevent-spread.html#precautions        Sources:  [facility], [place] [facility]          Instructions for Home Care of Patients and Caretakers with Coronavirus Disease  2019     Limit visitors to the home.  Older persons and those that have chronic medical conditions such as diabetes, lung and heart disease are at increased risk for illness.    If possible, patients should use a separate bedroom while recovering. Caregivers and household members should avoid prolonged contact with the patient which means to stay 6 feet away and avoid contact with cough droplets.  When close contact is necessary, wash your hands before and immediately after contact.    Perform hand hygiene frequently. Wash your hands often with soap and water for at least 20 seconds or use an alcohol-based hand , covering all surfaces of your hands and rubbing them together until they feel dry.    Avoid touching your eyes, nose, and mouth with unwashed hands.   Avoid sharing household items with the patient. You should not share dishes, drinking glasses, cups, eating utensils, towels, bedding, or other items. After the patient uses these items, you should wash them thoroughly.   Wash laundry thoroughly.   o Immediately remove and wash clothes or bedding that have blood, stool, or body fluids on them.   Clean all high-touch surfaces, such as counters, tabletops, doorknobs, bathroom fixtures, toilets, phones, keyboards, tablets, and bedside tables, every day.   o Use a household cleaning spray or wipe, according to the label instructions. Labels contain instructions for safe and effective use of the cleaning product including precautions you should take when applying the product, such as wearing gloves and making sure you have good ventilation during use of the product.    For more information see CDC link below.      https://www.cdc.gov/coronavirus/2019-ncov/hcp/guidance-prevent-spread.html#precautions               If your symptoms worsen or if you have any other concerns, please contact [facility] at [phone].

## 2020-11-26 NOTE — PATIENT INSTRUCTIONS
PLEASE READ YOUR DISCHARGE INSTRUCTIONS ENTIRELY AS IT CONTAINS IMPORTANT INFORMATION.    Patient had covid testing done today.      If Positive: improved symptoms, no fever without fever reducing meds for 24 hours- have to be out for a minimum of 10 days passed from when symptoms first appeared with improvements in respiratory symptoms.      Discussed corona virus precautions and reviewed CDC FAC; printed a copy for patient.  I discussed to continue to monitor their symptoms. Discussed that if their symptoms persist or worsen to seek re-evaluation. Clinic vs. ER precautions were given.  Patient verbalized understanding and agreed with the above plan of care.    - Rest.  Drink plenty of fluids.    - Tylenol as directed as needed for fever/pain.  For Tylenol, do not exceed 4000 mg/ day. If no contraindication.    -Below are suggestions for symptomatic relief:              -Salt water gargles to soothe throat pain.              -Chloroseptic spray also helps to numb throat pain.              -Nasal saline spray reduces inflammation and dryness.              -Warm face compresses to help with facial sinus pain/pressure.              -Vicks vapor rub at night.              -ATROVENT NASAL SPRAY for nasal congestion               -Flonase OTC or Nasacort OTC for nasal congestion.              -Simple foods like chicken noodle soup.              -Mucinex for cough during the day time. Delsym helps with coughing at night. Mucinex-D if you have chest congestion or sputum (caution if history of high blood pressure or palpitations).              -Zyrtec/Claritin during the day & Benadryl at night may help with allergies.  -If you DO NOT have Hypertension or any history of palpitations, it is ok to take over the counter Sudafed or Mucinex D or Allegra-D or Claritin-D or Zyrtec-D.  -If you do take one of the above, it is ok to combine that with plain over the counter Mucinex or Allegra or Claritin or Zyrtec. If, for example,  you are taking Zyrtec -D, you can combine that with Mucinex, but not Mucinex-D.  If you are taking Mucinex-D, you can combine that with plain Allegra or Claritin or Zyrtec.   -If you DO have Hypertension or palpitations, it is safe to take Coricidin HBP for relief of sinus symptoms.      For your GI symptoms:  -  -Use gatorade/pedialyte or rehydration packets to help stay hydrated. Vitamin water and plain water do not contain rehydrating electrolytes.  -Increase clear liquids (water, gatorade, pedialyte, broths, jello, etc) Hold off on solids for 12-18 hours. Then advance to BRAT diet (banana, rice, applesauce, tea, toast/crackers), then advance further as tolerated. Avoid spicy or fatty foods.   -Use Peptobismol or Immodium to help alleviate your diarrhea symptoms.   -Avoid imodium unless you have more than 6 loose stools in 24 hours.   -Wash hands frequently while sick. Avoid ibuprofen or other NSAIDS until you are well.   -Please go to the ER if you experience worsening pain, blood in your vomit or stool, high fever, dizziness, fainting, swelling of your abdomen, inability to pass gas or stool.         -You must understand that you've received an Urgent Care treatment only and that you may be released before all your medical problems are known or treated. You, the patient, will arrange for follow up care as instructed. Please arrange follow up with your primary medical clinic within 2-5 days if your signs and symptoms have not resolved or worsen.   - Follow up with your PCP or specialty clinic as directed.  You can call [phone] or [phone] to schedule an appointment with the appropriate provider.    - If your condition worsens or fails to improve we recommend that you receive another evaluation at the emergency room immediately or contact your primary medical clinic to discuss your concerns.                Instructions for Patients Awaiting COVID-19 Test Results    You will either be called with your  test result or it will be released to the patient portal.  If you have any questions about your test, please visit [email or web address] or call our COVID-19 information line at [phone].    Prevention steps for patients with confirmed or suspected COVID-19       Stay home and stay away from family members and friends. The CDC says, you can leave home after these three things have happened: 1) You have had no fever for at least 24 hours (that is one full day of no fever without the use of medicine that reduces fevers) 2) AND other symptoms have improved (for example, when your cough or shortness of breath have improved) 3) AND at least 10 days have passed since your symptoms first appeared.   Separate yourself from other people and animals in your home.   Call ahead before visiting your doctor.   Wear a facemask.   Cover your coughs and sneezes.   Wash your hands often with soap and water; hand  can be used, too.   Avoid sharing personal household items.   Wipe down surfaces used daily.   Monitor your symptoms. Seek prompt medical attention if your illness is worsening (e.g., difficulty breathing).    Before seeking care, call your healthcare provider.   If you have a medical emergency and need to call 911, notify the dispatch personnel that you have, or are being evaluated for COVID-19. If possible, put on a facemask before emergency medical services arrive.        Recommended precautions for household members, intimate partners, and caregivers in a home setting of a patient with symptomatic laboratory-confirmed COVID-19 or a patient under investigation.  Household members, intimate partners, and caregivers in the home setting awaiting tests results have close contact with a person with symptomatic, laboratory-confirmed COVID-19 or a person under investigation. Close contacts should monitor their health; they should call their provider right away if they develop symptoms suggestive  of COVID-19 (e.g., fever, cough, shortness of breath).    Close contacts should also follow these recommendations:   Make sure that you understand and can help the patient follow their provider's instructions for medication(s) and care. You should help the patient with basic needs in the home and provide support for getting groceries, prescriptions, and other personal needs.   Monitor the patient's symptoms. If the patient is getting sicker, call his or her healthcare provider and tell them that the patient has laboratory-confirmed COVID-19. If the patient has a medical emergency and you need to call 911, notify the dispatch personnel that the patient has, or is being evaluated for COVID-19.   Household members should stay in another room or be  from the patient. Household members should use a separate bedroom and bathroom, if available.   Prohibit visitors.   Household members should care for any pets in the home.   Make sure that shared spaces in the home have good air flow, such as by an air conditioner or an opened window, weather permitting.   Perform hand hygiene frequently. Wash your hands often with soap and water for at least 20 seconds or use an alcohol-based hand  (that contains > 60% alcohol) covering all surfaces of your hands and rubbing them together until they feel dry. Soap and water should be used preferentially.   Avoid touching your eyes, nose, and mouth.   The patient should wear a facemask. If the patient is not able to wear a facemask (for example, because it causes trouble breathing), caregivers should wear a mask when they are in the same room as the patient.   Wear a disposable facemask and gloves when you touch or have contact with the patient's blood, stool, or body fluids, such as saliva, sputum, nasal mucus, vomit, urine.  o Throw out disposable facemasks and gloves after using them. Do not reuse.  o When removing personal protective equipment, first remove  and dispose of gloves. Then, immediately clean your hands with soap and water or alcohol-based hand . Next, remove and dispose of facemask, and immediately clean your hands again with soap and water or alcohol-based hand .   You should not share dishes, drinking glasses, cups, eating utensils, towels, bedding, or other items with the patient. After the patient uses these items, you should wash them thoroughly (see below Wash laundry thoroughly).   Clean all high-touch surfaces, such as counters, tabletops, doorknobs, bathroom fixtures, toilets, phones, keyboards, tablets, and bedside tables, every day. Also, clean any surfaces that may have blood, stool, or body fluids on them.   Use a household cleaning spray or wipe, according to the label instructions. Labels contain instructions for safe and effective use of the cleaning product including precautions you should take when applying the product, such as wearing gloves and making sure you have good ventilation during use of the product.   Wash laundry thoroughly.  o Immediately remove and wash clothes or bedding that have blood, stool, or body fluids on them.  o Wear disposable gloves while handling soiled items and keep soiled items away from your body. Clean your hands (with soap and water or an alcohol-based hand ) immediately after removing your gloves.  o Read and follow directions on labels of laundry or clothing items and detergent. In general, using a normal laundry detergent according to washing machine instructions and dry thoroughly using the warmest temperatures recommended on the clothing label.   Place all used disposable gloves, facemasks, and other contaminated items in a lined container before disposing of them with other household waste. Clean your hands (with soap and water or an alcohol-based hand ) immediately after handling these items. Soap and water should be used preferentially if hands are  visibly dirty.   Discuss any additional questions with your state or local health department or healthcare provider. Check available hours when contacting your local health department.    For more information see CDC link below.      https://www.cdc.gov/coronavirus/2019-ncov/hcp/guidance-prevent-spread.html#precautions        Sources:  [facility], [place] [facility]          Instructions for Home Care of Patients and Caretakers with Coronavirus Disease 2019     Limit visitors to the home.  Older persons and those that have chronic medical conditions such as diabetes, lung and heart disease are at increased risk for illness.    If possible, patients should use a separate bedroom while recovering. Caregivers and household members should avoid prolonged contact with the patient which means to stay 6 feet away and avoid contact with cough droplets.  When close contact is necessary, wash your hands before and immediately after contact.    Perform hand hygiene frequently. Wash your hands often with soap and water for at least 20 seconds or use an alcohol-based hand , covering all surfaces of your hands and rubbing them together until they feel dry.    Avoid touching your eyes, nose, and mouth with unwashed hands.   Avoid sharing household items with the patient. You should not share dishes, drinking glasses, cups, eating utensils, towels, bedding, or other items. After the patient uses these items, you should wash them thoroughly.   Wash laundry thoroughly.   o Immediately remove and wash clothes or bedding that have blood, stool, or body fluids on them.   Clean all high-touch surfaces, such as counters, tabletops, doorknobs, bathroom fixtures, toilets, phones, keyboards, tablets, and bedside tables, every day.   o Use a household cleaning spray or wipe, according to the label instructions. Labels contain instructions for safe and effective use of the cleaning product including  precautions you should take when applying the product, such as wearing gloves and making sure you have good ventilation during use of the product.    For more information see CDC link below.      https://www.cdc.gov/coronavirus/2019-ncov/hcp/guidance-prevent-spread.html#precautions               If your symptoms worsen or if you have any other concerns, please contact [facility] at [phone].

## 2020-12-03 ENCOUNTER — TELEPHONE (OUTPATIENT)
Dept: PRIMARY CARE CLINIC | Facility: CLINIC | Age: 75
End: 2020-12-03

## 2020-12-03 NOTE — TELEPHONE ENCOUNTER
----- Message from Elena Rawls sent at 12/3/2020 12:43 PM CST -----  Contact: [phone]  Caller is requesting an earlier appt than we can schedule.  Caller declined first available appointment listed below. Caller will not accept being placed on the wait list and is requesting a message be sent to the provider.  When is the next available appointment:  02/21  Reason for the appointment:  6 mo f/u  Patient preference of timeframe to be scheduled:  12/15/20  Comments:

## 2020-12-11 ENCOUNTER — PATIENT MESSAGE (OUTPATIENT)
Dept: OTHER | Facility: OTHER | Age: 75
End: 2020-12-11

## 2021-01-11 ENCOUNTER — OFFICE VISIT (OUTPATIENT)
Dept: PRIMARY CARE CLINIC | Facility: CLINIC | Age: 76
End: 2021-01-11
Payer: MEDICARE

## 2021-01-11 VITALS
BODY MASS INDEX: 34.08 KG/M2 | OXYGEN SATURATION: 99 % | HEIGHT: 62 IN | SYSTOLIC BLOOD PRESSURE: 125 MMHG | DIASTOLIC BLOOD PRESSURE: 70 MMHG | TEMPERATURE: 98 F | HEART RATE: 79 BPM | RESPIRATION RATE: 18 BRPM | WEIGHT: 185.19 LBS

## 2021-01-11 DIAGNOSIS — R73.03 PREDIABETES: ICD-10-CM

## 2021-01-11 DIAGNOSIS — I10 HYPERTENSION, UNSPECIFIED TYPE: Primary | ICD-10-CM

## 2021-01-11 DIAGNOSIS — E78.5 HYPERLIPIDEMIA, UNSPECIFIED HYPERLIPIDEMIA TYPE: ICD-10-CM

## 2021-01-11 DIAGNOSIS — E04.2 MULTIPLE THYROID NODULES: ICD-10-CM

## 2021-01-11 DIAGNOSIS — T78.00XA ALLERGY WITH ANAPHYLAXIS DUE TO FOOD: ICD-10-CM

## 2021-01-11 DIAGNOSIS — M85.80 OSTEOPENIA, UNSPECIFIED LOCATION: ICD-10-CM

## 2021-01-11 PROCEDURE — 1159F MED LIST DOCD IN RCRD: CPT | Mod: HCNC,S$GLB,, | Performed by: INTERNAL MEDICINE

## 2021-01-11 PROCEDURE — 99214 OFFICE O/P EST MOD 30 MIN: CPT | Mod: HCNC,S$GLB,, | Performed by: INTERNAL MEDICINE

## 2021-01-11 PROCEDURE — 1126F PR PAIN SEVERITY QUANTIFIED, NO PAIN PRESENT: ICD-10-PCS | Mod: HCNC,S$GLB,, | Performed by: INTERNAL MEDICINE

## 2021-01-11 PROCEDURE — 1126F AMNT PAIN NOTED NONE PRSNT: CPT | Mod: HCNC,S$GLB,, | Performed by: INTERNAL MEDICINE

## 2021-01-11 PROCEDURE — 1101F PR PT FALLS ASSESS DOC 0-1 FALLS W/OUT INJ PAST YR: ICD-10-PCS | Mod: HCNC,CPTII,S$GLB, | Performed by: INTERNAL MEDICINE

## 2021-01-11 PROCEDURE — 3288F PR FALLS RISK ASSESSMENT DOCUMENTED: ICD-10-PCS | Mod: HCNC,CPTII,S$GLB, | Performed by: INTERNAL MEDICINE

## 2021-01-11 PROCEDURE — 3074F SYST BP LT 130 MM HG: CPT | Mod: HCNC,CPTII,S$GLB, | Performed by: INTERNAL MEDICINE

## 2021-01-11 PROCEDURE — 1101F PT FALLS ASSESS-DOCD LE1/YR: CPT | Mod: HCNC,CPTII,S$GLB, | Performed by: INTERNAL MEDICINE

## 2021-01-11 PROCEDURE — 1159F PR MEDICATION LIST DOCUMENTED IN MEDICAL RECORD: ICD-10-PCS | Mod: HCNC,S$GLB,, | Performed by: INTERNAL MEDICINE

## 2021-01-11 PROCEDURE — 99999 PR PBB SHADOW E&M-EST. PATIENT-LVL IV: CPT | Mod: PBBFAC,HCNC,, | Performed by: INTERNAL MEDICINE

## 2021-01-11 PROCEDURE — 3008F BODY MASS INDEX DOCD: CPT | Mod: HCNC,CPTII,S$GLB, | Performed by: INTERNAL MEDICINE

## 2021-01-11 PROCEDURE — 99214 PR OFFICE/OUTPT VISIT, EST, LEVL IV, 30-39 MIN: ICD-10-PCS | Mod: HCNC,S$GLB,, | Performed by: INTERNAL MEDICINE

## 2021-01-11 PROCEDURE — 3078F DIAST BP <80 MM HG: CPT | Mod: HCNC,CPTII,S$GLB, | Performed by: INTERNAL MEDICINE

## 2021-01-11 PROCEDURE — 3078F PR MOST RECENT DIASTOLIC BLOOD PRESSURE < 80 MM HG: ICD-10-PCS | Mod: HCNC,CPTII,S$GLB, | Performed by: INTERNAL MEDICINE

## 2021-01-11 PROCEDURE — 3074F PR MOST RECENT SYSTOLIC BLOOD PRESSURE < 130 MM HG: ICD-10-PCS | Mod: HCNC,CPTII,S$GLB, | Performed by: INTERNAL MEDICINE

## 2021-01-11 PROCEDURE — 3288F FALL RISK ASSESSMENT DOCD: CPT | Mod: HCNC,CPTII,S$GLB, | Performed by: INTERNAL MEDICINE

## 2021-01-11 PROCEDURE — 99999 PR PBB SHADOW E&M-EST. PATIENT-LVL IV: ICD-10-PCS | Mod: PBBFAC,HCNC,, | Performed by: INTERNAL MEDICINE

## 2021-01-11 PROCEDURE — 3008F PR BODY MASS INDEX (BMI) DOCUMENTED: ICD-10-PCS | Mod: HCNC,CPTII,S$GLB, | Performed by: INTERNAL MEDICINE

## 2021-01-11 RX ORDER — ROSUVASTATIN CALCIUM 5 MG/1
5 TABLET, COATED ORAL DAILY
Qty: 90 TABLET | Refills: 1 | Status: SHIPPED | OUTPATIENT
Start: 2021-01-11 | End: 2021-06-09

## 2021-01-11 RX ORDER — HYDROCHLOROTHIAZIDE 25 MG/1
25 TABLET ORAL DAILY
Qty: 90 TABLET | Refills: 1 | Status: SHIPPED | OUTPATIENT
Start: 2021-01-11 | End: 2021-07-14

## 2021-01-11 RX ORDER — AMLODIPINE BESYLATE 10 MG/1
10 TABLET ORAL DAILY
Qty: 90 TABLET | Refills: 1 | Status: SHIPPED | OUTPATIENT
Start: 2021-01-11 | End: 2021-06-09

## 2021-01-11 RX ORDER — EPINEPHRINE 0.3 MG/.3ML
1 INJECTION SUBCUTANEOUS
Qty: 1 DEVICE | Refills: 0 | Status: SHIPPED | OUTPATIENT
Start: 2021-01-11 | End: 2023-07-06 | Stop reason: SDUPTHER

## 2021-01-11 RX ORDER — ATENOLOL 50 MG/1
50 TABLET ORAL DAILY
Qty: 90 TABLET | Refills: 1 | Status: SHIPPED | OUTPATIENT
Start: 2021-01-11 | End: 2021-06-09

## 2021-03-03 ENCOUNTER — IMMUNIZATION (OUTPATIENT)
Dept: PRIMARY CARE CLINIC | Facility: CLINIC | Age: 76
End: 2021-03-03
Payer: MEDICARE

## 2021-03-03 DIAGNOSIS — Z23 NEED FOR VACCINATION: Primary | ICD-10-CM

## 2021-03-03 PROCEDURE — 91300 PR SARS-COV- 2 COVID-19 VACCINE, NO PRSV, 30MCG/0.3ML, IM: ICD-10-PCS | Mod: S$GLB,,, | Performed by: INTERNAL MEDICINE

## 2021-03-03 PROCEDURE — 91300 PR SARS-COV- 2 COVID-19 VACCINE, NO PRSV, 30MCG/0.3ML, IM: CPT | Mod: S$GLB,,, | Performed by: INTERNAL MEDICINE

## 2021-03-03 PROCEDURE — 0001A PR IMMUNIZ ADMIN, SARS-COV-2 COVID-19 VACC, 30MCG/0.3ML, 1ST DOSE: ICD-10-PCS | Mod: CV19,S$GLB,, | Performed by: INTERNAL MEDICINE

## 2021-03-03 PROCEDURE — 0001A PR IMMUNIZ ADMIN, SARS-COV-2 COVID-19 VACC, 30MCG/0.3ML, 1ST DOSE: CPT | Mod: CV19,S$GLB,, | Performed by: INTERNAL MEDICINE

## 2021-03-03 RX ADMIN — Medication 0.3 ML: at 08:03

## 2021-03-26 ENCOUNTER — IMMUNIZATION (OUTPATIENT)
Dept: PRIMARY CARE CLINIC | Facility: CLINIC | Age: 76
End: 2021-03-26

## 2021-03-26 DIAGNOSIS — Z23 NEED FOR VACCINATION: Primary | ICD-10-CM

## 2021-03-26 PROCEDURE — 0002A PR IMMUNIZ ADMIN, SARS-COV-2 COVID-19 VACC, 30MCG/0.3ML, 2ND DOSE: ICD-10-PCS | Mod: CV19,S$GLB,, | Performed by: INTERNAL MEDICINE

## 2021-03-26 PROCEDURE — 0002A PR IMMUNIZ ADMIN, SARS-COV-2 COVID-19 VACC, 30MCG/0.3ML, 2ND DOSE: CPT | Mod: CV19,S$GLB,, | Performed by: INTERNAL MEDICINE

## 2021-03-26 PROCEDURE — 91300 PR SARS-COV- 2 COVID-19 VACCINE, NO PRSV, 30MCG/0.3ML, IM: ICD-10-PCS | Mod: S$GLB,,, | Performed by: INTERNAL MEDICINE

## 2021-03-26 PROCEDURE — 91300 PR SARS-COV- 2 COVID-19 VACCINE, NO PRSV, 30MCG/0.3ML, IM: CPT | Mod: S$GLB,,, | Performed by: INTERNAL MEDICINE

## 2021-03-26 RX ADMIN — Medication 0.3 ML: at 08:03

## 2021-04-29 ENCOUNTER — HOSPITAL ENCOUNTER (OUTPATIENT)
Dept: RADIOLOGY | Facility: HOSPITAL | Age: 76
Discharge: HOME | End: 2021-04-29
Attending: INTERNAL MEDICINE
Payer: MEDICARE

## 2021-04-29 ENCOUNTER — TELEPHONE (OUTPATIENT)
Dept: PRIMARY CARE CLINIC | Facility: CLINIC | Age: 76
End: 2021-04-29

## 2021-04-29 ENCOUNTER — OFFICE VISIT (OUTPATIENT)
Dept: PRIMARY CARE CLINIC | Facility: CLINIC | Age: 76
End: 2021-04-29
Payer: MEDICARE

## 2021-04-29 VITALS
RESPIRATION RATE: 18 BRPM | WEIGHT: 184.06 LBS | HEIGHT: 62 IN | TEMPERATURE: 98 F | DIASTOLIC BLOOD PRESSURE: 66 MMHG | HEART RATE: 62 BPM | SYSTOLIC BLOOD PRESSURE: 123 MMHG | OXYGEN SATURATION: 97 % | BODY MASS INDEX: 33.87 KG/M2

## 2021-04-29 DIAGNOSIS — M79.672 HEEL PAIN, BILATERAL: ICD-10-CM

## 2021-04-29 DIAGNOSIS — M79.671 HEEL PAIN, BILATERAL: ICD-10-CM

## 2021-04-29 DIAGNOSIS — M25.562 CHRONIC PAIN OF LEFT KNEE: Primary | ICD-10-CM

## 2021-04-29 DIAGNOSIS — M25.361 KNEE INSTABILITY, RIGHT: ICD-10-CM

## 2021-04-29 DIAGNOSIS — G89.29 CHRONIC PAIN OF LEFT KNEE: Primary | ICD-10-CM

## 2021-04-29 DIAGNOSIS — M25.562 CHRONIC PAIN OF LEFT KNEE: ICD-10-CM

## 2021-04-29 DIAGNOSIS — G89.29 CHRONIC PAIN OF LEFT KNEE: ICD-10-CM

## 2021-04-29 DIAGNOSIS — Z12.31 SCREENING MAMMOGRAM, ENCOUNTER FOR: ICD-10-CM

## 2021-04-29 DIAGNOSIS — M79.609 POPLITEAL PAIN: ICD-10-CM

## 2021-04-29 PROCEDURE — 73650 X-RAY EXAM OF HEEL: CPT | Mod: TC,50,PN

## 2021-04-29 PROCEDURE — 1159F MED LIST DOCD IN RCRD: CPT | Mod: S$GLB,,, | Performed by: INTERNAL MEDICINE

## 2021-04-29 PROCEDURE — 3288F PR FALLS RISK ASSESSMENT DOCUMENTED: ICD-10-PCS | Mod: CPTII,S$GLB,, | Performed by: INTERNAL MEDICINE

## 2021-04-29 PROCEDURE — 3008F BODY MASS INDEX DOCD: CPT | Mod: CPTII,S$GLB,, | Performed by: INTERNAL MEDICINE

## 2021-04-29 PROCEDURE — 1125F PR PAIN SEVERITY QUANTIFIED, PAIN PRESENT: ICD-10-PCS | Mod: S$GLB,,, | Performed by: INTERNAL MEDICINE

## 2021-04-29 PROCEDURE — 99999 PR PBB SHADOW E&M-EST. PATIENT-LVL V: CPT | Mod: PBBFAC,,, | Performed by: INTERNAL MEDICINE

## 2021-04-29 PROCEDURE — 73564 XR KNEE ORTHO BILAT WITH FLEXION: ICD-10-PCS | Mod: 26,50,, | Performed by: RADIOLOGY

## 2021-04-29 PROCEDURE — 1101F PT FALLS ASSESS-DOCD LE1/YR: CPT | Mod: CPTII,S$GLB,, | Performed by: INTERNAL MEDICINE

## 2021-04-29 PROCEDURE — 73564 X-RAY EXAM KNEE 4 OR MORE: CPT | Mod: TC,50,PN

## 2021-04-29 PROCEDURE — 99999 PR PBB SHADOW E&M-EST. PATIENT-LVL V: ICD-10-PCS | Mod: PBBFAC,,, | Performed by: INTERNAL MEDICINE

## 2021-04-29 PROCEDURE — 99214 PR OFFICE/OUTPT VISIT, EST, LEVL IV, 30-39 MIN: ICD-10-PCS | Mod: S$GLB,,, | Performed by: INTERNAL MEDICINE

## 2021-04-29 PROCEDURE — 1125F AMNT PAIN NOTED PAIN PRSNT: CPT | Mod: S$GLB,,, | Performed by: INTERNAL MEDICINE

## 2021-04-29 PROCEDURE — 99214 OFFICE O/P EST MOD 30 MIN: CPT | Mod: S$GLB,,, | Performed by: INTERNAL MEDICINE

## 2021-04-29 PROCEDURE — 1101F PR PT FALLS ASSESS DOC 0-1 FALLS W/OUT INJ PAST YR: ICD-10-PCS | Mod: CPTII,S$GLB,, | Performed by: INTERNAL MEDICINE

## 2021-04-29 PROCEDURE — 73650 X-RAY EXAM OF HEEL: CPT | Mod: 26,50,, | Performed by: RADIOLOGY

## 2021-04-29 PROCEDURE — 73650 XR CALCANEUS BILATERAL: ICD-10-PCS | Mod: 26,50,, | Performed by: RADIOLOGY

## 2021-04-29 PROCEDURE — 3008F PR BODY MASS INDEX (BMI) DOCUMENTED: ICD-10-PCS | Mod: CPTII,S$GLB,, | Performed by: INTERNAL MEDICINE

## 2021-04-29 PROCEDURE — 1159F PR MEDICATION LIST DOCUMENTED IN MEDICAL RECORD: ICD-10-PCS | Mod: S$GLB,,, | Performed by: INTERNAL MEDICINE

## 2021-04-29 PROCEDURE — 3288F FALL RISK ASSESSMENT DOCD: CPT | Mod: CPTII,S$GLB,, | Performed by: INTERNAL MEDICINE

## 2021-04-29 PROCEDURE — 73564 X-RAY EXAM KNEE 4 OR MORE: CPT | Mod: 26,50,, | Performed by: RADIOLOGY

## 2021-04-29 RX ORDER — METHYLPREDNISOLONE 4 MG/1
TABLET ORAL
Qty: 1 PACKAGE | Refills: 0 | Status: SHIPPED | OUTPATIENT
Start: 2021-04-29 | End: 2021-05-20

## 2021-05-10 ENCOUNTER — OFFICE VISIT (OUTPATIENT)
Dept: SPORTS MEDICINE | Facility: CLINIC | Age: 76
End: 2021-05-10
Payer: MEDICARE

## 2021-05-10 VITALS
WEIGHT: 184.06 LBS | HEIGHT: 62 IN | DIASTOLIC BLOOD PRESSURE: 74 MMHG | SYSTOLIC BLOOD PRESSURE: 140 MMHG | BODY MASS INDEX: 33.87 KG/M2

## 2021-05-10 DIAGNOSIS — M25.361 KNEE INSTABILITY, RIGHT: ICD-10-CM

## 2021-05-10 DIAGNOSIS — M22.2X2 PATELLOFEMORAL PAIN SYNDROME OF BOTH KNEES: Primary | ICD-10-CM

## 2021-05-10 DIAGNOSIS — M22.2X1 PATELLOFEMORAL PAIN SYNDROME OF BOTH KNEES: Primary | ICD-10-CM

## 2021-05-10 DIAGNOSIS — M17.0 PRIMARY OSTEOARTHRITIS OF KNEES, BILATERAL: ICD-10-CM

## 2021-05-10 DIAGNOSIS — M25.562 CHRONIC PAIN OF LEFT KNEE: ICD-10-CM

## 2021-05-10 DIAGNOSIS — G89.29 CHRONIC PAIN OF LEFT KNEE: ICD-10-CM

## 2021-05-10 DIAGNOSIS — R03.0 ELEVATED BLOOD PRESSURE READING: ICD-10-CM

## 2021-05-10 PROCEDURE — 97110 THERAPEUTIC EXERCISES: CPT | Mod: S$GLB,,, | Performed by: STUDENT IN AN ORGANIZED HEALTH CARE EDUCATION/TRAINING PROGRAM

## 2021-05-10 PROCEDURE — 99204 PR OFFICE/OUTPT VISIT, NEW, LEVL IV, 45-59 MIN: ICD-10-PCS | Mod: S$GLB,,, | Performed by: STUDENT IN AN ORGANIZED HEALTH CARE EDUCATION/TRAINING PROGRAM

## 2021-05-10 PROCEDURE — 99999 PR PBB SHADOW E&M-EST. PATIENT-LVL III: ICD-10-PCS | Mod: PBBFAC,,, | Performed by: STUDENT IN AN ORGANIZED HEALTH CARE EDUCATION/TRAINING PROGRAM

## 2021-05-10 PROCEDURE — 1159F PR MEDICATION LIST DOCUMENTED IN MEDICAL RECORD: ICD-10-PCS | Mod: S$GLB,,, | Performed by: STUDENT IN AN ORGANIZED HEALTH CARE EDUCATION/TRAINING PROGRAM

## 2021-05-10 PROCEDURE — 3077F SYST BP >= 140 MM HG: CPT | Mod: CPTII,S$GLB,, | Performed by: STUDENT IN AN ORGANIZED HEALTH CARE EDUCATION/TRAINING PROGRAM

## 2021-05-10 PROCEDURE — 3008F PR BODY MASS INDEX (BMI) DOCUMENTED: ICD-10-PCS | Mod: CPTII,S$GLB,, | Performed by: STUDENT IN AN ORGANIZED HEALTH CARE EDUCATION/TRAINING PROGRAM

## 2021-05-10 PROCEDURE — 1159F MED LIST DOCD IN RCRD: CPT | Mod: S$GLB,,, | Performed by: STUDENT IN AN ORGANIZED HEALTH CARE EDUCATION/TRAINING PROGRAM

## 2021-05-10 PROCEDURE — 99204 OFFICE O/P NEW MOD 45 MIN: CPT | Mod: S$GLB,,, | Performed by: STUDENT IN AN ORGANIZED HEALTH CARE EDUCATION/TRAINING PROGRAM

## 2021-05-10 PROCEDURE — 3077F PR MOST RECENT SYSTOLIC BLOOD PRESSURE >= 140 MM HG: ICD-10-PCS | Mod: CPTII,S$GLB,, | Performed by: STUDENT IN AN ORGANIZED HEALTH CARE EDUCATION/TRAINING PROGRAM

## 2021-05-10 PROCEDURE — 99999 PR PBB SHADOW E&M-EST. PATIENT-LVL III: CPT | Mod: PBBFAC,,, | Performed by: STUDENT IN AN ORGANIZED HEALTH CARE EDUCATION/TRAINING PROGRAM

## 2021-05-10 PROCEDURE — 3008F BODY MASS INDEX DOCD: CPT | Mod: CPTII,S$GLB,, | Performed by: STUDENT IN AN ORGANIZED HEALTH CARE EDUCATION/TRAINING PROGRAM

## 2021-05-10 PROCEDURE — 97110 PR THERAPEUTIC EXERCISES: ICD-10-PCS | Mod: S$GLB,,, | Performed by: STUDENT IN AN ORGANIZED HEALTH CARE EDUCATION/TRAINING PROGRAM

## 2021-05-10 RX ORDER — DICLOFENAC SODIUM 10 MG/G
2 GEL TOPICAL 4 TIMES DAILY
Qty: 1 TUBE | Refills: 3 | Status: SHIPPED | OUTPATIENT
Start: 2021-05-10 | End: 2023-11-09 | Stop reason: SDUPTHER

## 2021-05-12 DIAGNOSIS — K21.9 GASTROESOPHAGEAL REFLUX DISEASE, UNSPECIFIED WHETHER ESOPHAGITIS PRESENT: ICD-10-CM

## 2021-05-12 RX ORDER — FAMOTIDINE 40 MG/1
40 TABLET, FILM COATED ORAL DAILY
Qty: 90 TABLET | Refills: 1 | Status: SHIPPED | OUTPATIENT
Start: 2021-05-12 | End: 2021-11-21

## 2021-05-31 ENCOUNTER — OFFICE VISIT (OUTPATIENT)
Dept: URGENT CARE | Facility: CLINIC | Age: 76
End: 2021-05-31
Payer: MEDICARE

## 2021-05-31 VITALS
TEMPERATURE: 97 F | SYSTOLIC BLOOD PRESSURE: 105 MMHG | HEIGHT: 62 IN | DIASTOLIC BLOOD PRESSURE: 62 MMHG | HEART RATE: 59 BPM | WEIGHT: 180 LBS | OXYGEN SATURATION: 96 % | RESPIRATION RATE: 14 BRPM | BODY MASS INDEX: 33.13 KG/M2

## 2021-05-31 DIAGNOSIS — M62.838 MUSCLE SPASMS OF NECK: Primary | ICD-10-CM

## 2021-05-31 PROCEDURE — 3008F PR BODY MASS INDEX (BMI) DOCUMENTED: ICD-10-PCS | Mod: CPTII,S$GLB,, | Performed by: NURSE PRACTITIONER

## 2021-05-31 PROCEDURE — 99213 PR OFFICE/OUTPT VISIT, EST, LEVL III, 20-29 MIN: ICD-10-PCS | Mod: S$GLB,,, | Performed by: NURSE PRACTITIONER

## 2021-05-31 PROCEDURE — 3008F BODY MASS INDEX DOCD: CPT | Mod: CPTII,S$GLB,, | Performed by: NURSE PRACTITIONER

## 2021-05-31 PROCEDURE — 99213 OFFICE O/P EST LOW 20 MIN: CPT | Mod: S$GLB,,, | Performed by: NURSE PRACTITIONER

## 2021-05-31 RX ORDER — NEOMYCIN SULFATE, POLYMYXIN B SULFATE, AND DEXAMETHASONE 3.5; 10000; 1 MG/G; [USP'U]/G; MG/G
OINTMENT OPHTHALMIC
COMMUNITY
Start: 2021-01-08 | End: 2023-02-17

## 2021-05-31 RX ORDER — TIZANIDINE 2 MG/1
2 TABLET ORAL EVERY 8 HOURS PRN
Qty: 20 TABLET | Refills: 0 | Status: SHIPPED | OUTPATIENT
Start: 2021-05-31 | End: 2021-06-07

## 2021-06-17 ENCOUNTER — OFFICE VISIT (OUTPATIENT)
Dept: URGENT CARE | Facility: CLINIC | Age: 76
End: 2021-06-17
Payer: MEDICARE

## 2021-06-17 VITALS
HEIGHT: 62 IN | HEART RATE: 68 BPM | TEMPERATURE: 98 F | BODY MASS INDEX: 33.13 KG/M2 | SYSTOLIC BLOOD PRESSURE: 130 MMHG | OXYGEN SATURATION: 95 % | RESPIRATION RATE: 14 BRPM | DIASTOLIC BLOOD PRESSURE: 70 MMHG | WEIGHT: 180 LBS

## 2021-06-17 DIAGNOSIS — R10.84 GENERALIZED ABDOMINAL PAIN: ICD-10-CM

## 2021-06-17 DIAGNOSIS — K59.00 CONSTIPATION, UNSPECIFIED CONSTIPATION TYPE: Primary | ICD-10-CM

## 2021-06-17 PROCEDURE — 74019 RADEX ABDOMEN 2 VIEWS: CPT | Mod: FY,S$GLB,, | Performed by: RADIOLOGY

## 2021-06-17 PROCEDURE — 99213 PR OFFICE/OUTPT VISIT, EST, LEVL III, 20-29 MIN: ICD-10-PCS | Mod: S$GLB,,, | Performed by: STUDENT IN AN ORGANIZED HEALTH CARE EDUCATION/TRAINING PROGRAM

## 2021-06-17 PROCEDURE — 74019 XR ABDOMEN FLAT AND ERECT: ICD-10-PCS | Mod: FY,S$GLB,, | Performed by: RADIOLOGY

## 2021-06-17 PROCEDURE — 99213 OFFICE O/P EST LOW 20 MIN: CPT | Mod: S$GLB,,, | Performed by: STUDENT IN AN ORGANIZED HEALTH CARE EDUCATION/TRAINING PROGRAM

## 2021-06-17 RX ORDER — LACTULOSE 10 G/15ML
10 SOLUTION ORAL 3 TIMES DAILY PRN
Qty: 75 ML | Refills: 0 | Status: SHIPPED | OUTPATIENT
Start: 2021-06-17 | End: 2021-06-17 | Stop reason: SDUPTHER

## 2021-06-17 RX ORDER — ONDANSETRON 4 MG/1
4 TABLET, ORALLY DISINTEGRATING ORAL EVERY 8 HOURS PRN
Qty: 3 TABLET | Refills: 0 | Status: SHIPPED | OUTPATIENT
Start: 2021-06-17 | End: 2021-06-19

## 2021-06-17 RX ORDER — LACTULOSE 10 G/15ML
10 SOLUTION ORAL 3 TIMES DAILY PRN
Qty: 75 ML | Refills: 0 | Status: SHIPPED | OUTPATIENT
Start: 2021-06-17 | End: 2021-06-20

## 2021-06-18 ENCOUNTER — HOSPITAL ENCOUNTER (OUTPATIENT)
Dept: RADIOLOGY | Facility: HOSPITAL | Age: 76
Discharge: HOME | End: 2021-06-18
Attending: INTERNAL MEDICINE
Payer: MEDICARE

## 2021-06-18 ENCOUNTER — OFFICE VISIT (OUTPATIENT)
Dept: SPORTS MEDICINE | Facility: CLINIC | Age: 76
End: 2021-06-18
Payer: MEDICARE

## 2021-06-18 VITALS
HEIGHT: 62 IN | SYSTOLIC BLOOD PRESSURE: 127 MMHG | WEIGHT: 178 LBS | HEART RATE: 67 BPM | BODY MASS INDEX: 32.76 KG/M2 | DIASTOLIC BLOOD PRESSURE: 67 MMHG

## 2021-06-18 DIAGNOSIS — Z12.31 SCREENING MAMMOGRAM, ENCOUNTER FOR: ICD-10-CM

## 2021-06-18 DIAGNOSIS — M25.561 BILATERAL CHRONIC KNEE PAIN: Primary | ICD-10-CM

## 2021-06-18 DIAGNOSIS — G89.29 BILATERAL CHRONIC KNEE PAIN: Primary | ICD-10-CM

## 2021-06-18 DIAGNOSIS — M22.2X2 PATELLOFEMORAL PAIN SYNDROME OF BOTH KNEES: ICD-10-CM

## 2021-06-18 DIAGNOSIS — M22.2X1 PATELLOFEMORAL PAIN SYNDROME OF BOTH KNEES: ICD-10-CM

## 2021-06-18 DIAGNOSIS — M25.562 BILATERAL CHRONIC KNEE PAIN: Primary | ICD-10-CM

## 2021-06-18 PROCEDURE — 77067 MAMMO DIGITAL SCREENING BILAT WITH TOMO: ICD-10-PCS | Mod: 26,,, | Performed by: RADIOLOGY

## 2021-06-18 PROCEDURE — 77067 SCR MAMMO BI INCL CAD: CPT | Mod: 26,,, | Performed by: RADIOLOGY

## 2021-06-18 PROCEDURE — 99999 PR PBB SHADOW E&M-EST. PATIENT-LVL III: ICD-10-PCS | Mod: PBBFAC,,, | Performed by: STUDENT IN AN ORGANIZED HEALTH CARE EDUCATION/TRAINING PROGRAM

## 2021-06-18 PROCEDURE — 1101F PR PT FALLS ASSESS DOC 0-1 FALLS W/OUT INJ PAST YR: ICD-10-PCS | Mod: CPTII,S$GLB,, | Performed by: STUDENT IN AN ORGANIZED HEALTH CARE EDUCATION/TRAINING PROGRAM

## 2021-06-18 PROCEDURE — 1159F MED LIST DOCD IN RCRD: CPT | Mod: S$GLB,,, | Performed by: STUDENT IN AN ORGANIZED HEALTH CARE EDUCATION/TRAINING PROGRAM

## 2021-06-18 PROCEDURE — 3288F FALL RISK ASSESSMENT DOCD: CPT | Mod: CPTII,S$GLB,, | Performed by: STUDENT IN AN ORGANIZED HEALTH CARE EDUCATION/TRAINING PROGRAM

## 2021-06-18 PROCEDURE — 1126F AMNT PAIN NOTED NONE PRSNT: CPT | Mod: S$GLB,,, | Performed by: STUDENT IN AN ORGANIZED HEALTH CARE EDUCATION/TRAINING PROGRAM

## 2021-06-18 PROCEDURE — 99213 OFFICE O/P EST LOW 20 MIN: CPT | Mod: S$GLB,,, | Performed by: STUDENT IN AN ORGANIZED HEALTH CARE EDUCATION/TRAINING PROGRAM

## 2021-06-18 PROCEDURE — 77063 BREAST TOMOSYNTHESIS BI: CPT | Mod: 26,,, | Performed by: RADIOLOGY

## 2021-06-18 PROCEDURE — 99213 PR OFFICE/OUTPT VISIT, EST, LEVL III, 20-29 MIN: ICD-10-PCS | Mod: S$GLB,,, | Performed by: STUDENT IN AN ORGANIZED HEALTH CARE EDUCATION/TRAINING PROGRAM

## 2021-06-18 PROCEDURE — 3288F PR FALLS RISK ASSESSMENT DOCUMENTED: ICD-10-PCS | Mod: CPTII,S$GLB,, | Performed by: STUDENT IN AN ORGANIZED HEALTH CARE EDUCATION/TRAINING PROGRAM

## 2021-06-18 PROCEDURE — 99999 PR PBB SHADOW E&M-EST. PATIENT-LVL III: CPT | Mod: PBBFAC,,, | Performed by: STUDENT IN AN ORGANIZED HEALTH CARE EDUCATION/TRAINING PROGRAM

## 2021-06-18 PROCEDURE — 77063 MAMMO DIGITAL SCREENING BILAT WITH TOMO: ICD-10-PCS | Mod: 26,,, | Performed by: RADIOLOGY

## 2021-06-18 PROCEDURE — 1126F PR PAIN SEVERITY QUANTIFIED, NO PAIN PRESENT: ICD-10-PCS | Mod: S$GLB,,, | Performed by: STUDENT IN AN ORGANIZED HEALTH CARE EDUCATION/TRAINING PROGRAM

## 2021-06-18 PROCEDURE — 1101F PT FALLS ASSESS-DOCD LE1/YR: CPT | Mod: CPTII,S$GLB,, | Performed by: STUDENT IN AN ORGANIZED HEALTH CARE EDUCATION/TRAINING PROGRAM

## 2021-06-18 PROCEDURE — 1159F PR MEDICATION LIST DOCUMENTED IN MEDICAL RECORD: ICD-10-PCS | Mod: S$GLB,,, | Performed by: STUDENT IN AN ORGANIZED HEALTH CARE EDUCATION/TRAINING PROGRAM

## 2021-06-18 PROCEDURE — 77067 SCR MAMMO BI INCL CAD: CPT | Mod: TC,PO

## 2021-07-12 ENCOUNTER — LAB VISIT (OUTPATIENT)
Dept: LAB | Facility: HOSPITAL | Age: 76
End: 2021-07-12
Attending: INTERNAL MEDICINE
Payer: MEDICARE

## 2021-07-12 DIAGNOSIS — I10 HYPERTENSION, UNSPECIFIED TYPE: ICD-10-CM

## 2021-07-12 LAB
ANION GAP SERPL CALC-SCNC: 11 MMOL/L (ref 8–16)
BUN SERPL-MCNC: 15 MG/DL (ref 8–23)
CALCIUM SERPL-MCNC: 9.4 MG/DL (ref 8.7–10.5)
CHLORIDE SERPL-SCNC: 102 MMOL/L (ref 95–110)
CO2 SERPL-SCNC: 22 MMOL/L (ref 23–29)
CREAT SERPL-MCNC: 1 MG/DL (ref 0.5–1.4)
EST. GFR  (AFRICAN AMERICAN): >60 ML/MIN/1.73 M^2
EST. GFR  (NON AFRICAN AMERICAN): 55.2 ML/MIN/1.73 M^2
GLUCOSE SERPL-MCNC: 86 MG/DL (ref 70–110)
POTASSIUM SERPL-SCNC: 3.8 MMOL/L (ref 3.5–5.1)
SODIUM SERPL-SCNC: 135 MMOL/L (ref 136–145)

## 2021-07-12 PROCEDURE — 36415 COLL VENOUS BLD VENIPUNCTURE: CPT | Mod: PO | Performed by: INTERNAL MEDICINE

## 2021-07-12 PROCEDURE — 80048 BASIC METABOLIC PNL TOTAL CA: CPT | Performed by: INTERNAL MEDICINE

## 2021-07-14 ENCOUNTER — OFFICE VISIT (OUTPATIENT)
Dept: PRIMARY CARE CLINIC | Facility: CLINIC | Age: 76
End: 2021-07-14
Payer: MEDICARE

## 2021-07-14 VITALS
OXYGEN SATURATION: 99 % | SYSTOLIC BLOOD PRESSURE: 127 MMHG | DIASTOLIC BLOOD PRESSURE: 76 MMHG | RESPIRATION RATE: 19 BRPM | BODY MASS INDEX: 33.1 KG/M2 | TEMPERATURE: 98 F | WEIGHT: 179.88 LBS | HEIGHT: 62 IN | HEART RATE: 72 BPM

## 2021-07-14 DIAGNOSIS — M19.90 OSTEOARTHRITIS, UNSPECIFIED OSTEOARTHRITIS TYPE, UNSPECIFIED SITE: ICD-10-CM

## 2021-07-14 DIAGNOSIS — M25.361 KNEE INSTABILITY, RIGHT: ICD-10-CM

## 2021-07-14 DIAGNOSIS — R73.03 PREDIABETES: ICD-10-CM

## 2021-07-14 DIAGNOSIS — E78.5 HYPERLIPIDEMIA, UNSPECIFIED HYPERLIPIDEMIA TYPE: ICD-10-CM

## 2021-07-14 DIAGNOSIS — I10 HYPERTENSION, UNSPECIFIED TYPE: Primary | ICD-10-CM

## 2021-07-14 PROCEDURE — 99214 OFFICE O/P EST MOD 30 MIN: CPT | Mod: S$GLB,,, | Performed by: INTERNAL MEDICINE

## 2021-07-14 PROCEDURE — 3074F SYST BP LT 130 MM HG: CPT | Mod: CPTII,S$GLB,, | Performed by: INTERNAL MEDICINE

## 2021-07-14 PROCEDURE — 99214 PR OFFICE/OUTPT VISIT, EST, LEVL IV, 30-39 MIN: ICD-10-PCS | Mod: S$GLB,,, | Performed by: INTERNAL MEDICINE

## 2021-07-14 PROCEDURE — 1126F AMNT PAIN NOTED NONE PRSNT: CPT | Mod: S$GLB,,, | Performed by: INTERNAL MEDICINE

## 2021-07-14 PROCEDURE — 3288F PR FALLS RISK ASSESSMENT DOCUMENTED: ICD-10-PCS | Mod: CPTII,S$GLB,, | Performed by: INTERNAL MEDICINE

## 2021-07-14 PROCEDURE — 3078F PR MOST RECENT DIASTOLIC BLOOD PRESSURE < 80 MM HG: ICD-10-PCS | Mod: CPTII,S$GLB,, | Performed by: INTERNAL MEDICINE

## 2021-07-14 PROCEDURE — 3288F FALL RISK ASSESSMENT DOCD: CPT | Mod: CPTII,S$GLB,, | Performed by: INTERNAL MEDICINE

## 2021-07-14 PROCEDURE — 3074F PR MOST RECENT SYSTOLIC BLOOD PRESSURE < 130 MM HG: ICD-10-PCS | Mod: CPTII,S$GLB,, | Performed by: INTERNAL MEDICINE

## 2021-07-14 PROCEDURE — 99499 RISK ADDL DX/OHS AUDIT: ICD-10-PCS | Mod: S$PBB,,, | Performed by: INTERNAL MEDICINE

## 2021-07-14 PROCEDURE — 99999 PR PBB SHADOW E&M-EST. PATIENT-LVL IV: ICD-10-PCS | Mod: PBBFAC,,, | Performed by: INTERNAL MEDICINE

## 2021-07-14 PROCEDURE — 1101F PT FALLS ASSESS-DOCD LE1/YR: CPT | Mod: CPTII,S$GLB,, | Performed by: INTERNAL MEDICINE

## 2021-07-14 PROCEDURE — 99499 UNLISTED E&M SERVICE: CPT | Mod: S$PBB,,, | Performed by: INTERNAL MEDICINE

## 2021-07-14 PROCEDURE — 1159F MED LIST DOCD IN RCRD: CPT | Mod: S$GLB,,, | Performed by: INTERNAL MEDICINE

## 2021-07-14 PROCEDURE — 99999 PR PBB SHADOW E&M-EST. PATIENT-LVL IV: CPT | Mod: PBBFAC,,, | Performed by: INTERNAL MEDICINE

## 2021-07-14 PROCEDURE — 1101F PR PT FALLS ASSESS DOC 0-1 FALLS W/OUT INJ PAST YR: ICD-10-PCS | Mod: CPTII,S$GLB,, | Performed by: INTERNAL MEDICINE

## 2021-07-14 PROCEDURE — 3078F DIAST BP <80 MM HG: CPT | Mod: CPTII,S$GLB,, | Performed by: INTERNAL MEDICINE

## 2021-07-14 PROCEDURE — 1126F PR PAIN SEVERITY QUANTIFIED, NO PAIN PRESENT: ICD-10-PCS | Mod: S$GLB,,, | Performed by: INTERNAL MEDICINE

## 2021-07-14 PROCEDURE — 1159F PR MEDICATION LIST DOCUMENTED IN MEDICAL RECORD: ICD-10-PCS | Mod: S$GLB,,, | Performed by: INTERNAL MEDICINE

## 2021-07-14 RX ORDER — LACTULOSE 10 G/15ML
SOLUTION ORAL; RECTAL
COMMUNITY
Start: 2021-06-17 | End: 2021-07-14

## 2021-07-29 ENCOUNTER — LAB VISIT (OUTPATIENT)
Dept: LAB | Facility: HOSPITAL | Age: 76
End: 2021-07-29
Attending: INTERNAL MEDICINE
Payer: MEDICARE

## 2021-07-29 DIAGNOSIS — I10 HYPERTENSION, UNSPECIFIED TYPE: ICD-10-CM

## 2021-07-29 DIAGNOSIS — R73.03 PREDIABETES: ICD-10-CM

## 2021-07-29 DIAGNOSIS — E78.5 HYPERLIPIDEMIA, UNSPECIFIED HYPERLIPIDEMIA TYPE: ICD-10-CM

## 2021-07-29 LAB
ALBUMIN SERPL BCP-MCNC: 3.9 G/DL (ref 3.5–5.2)
ALP SERPL-CCNC: 69 U/L (ref 55–135)
ALT SERPL W/O P-5'-P-CCNC: 20 U/L (ref 10–44)
ANION GAP SERPL CALC-SCNC: 9 MMOL/L (ref 8–16)
AST SERPL-CCNC: 30 U/L (ref 10–40)
BILIRUB SERPL-MCNC: 0.6 MG/DL (ref 0.1–1)
BUN SERPL-MCNC: 21 MG/DL (ref 8–23)
CALCIUM SERPL-MCNC: 10.2 MG/DL (ref 8.7–10.5)
CHLORIDE SERPL-SCNC: 104 MMOL/L (ref 95–110)
CHOLEST SERPL-MCNC: 143 MG/DL (ref 120–199)
CHOLEST/HDLC SERPL: 3.3 {RATIO} (ref 2–5)
CO2 SERPL-SCNC: 27 MMOL/L (ref 23–29)
CREAT SERPL-MCNC: 1.2 MG/DL (ref 0.5–1.4)
EST. GFR  (AFRICAN AMERICAN): 51.1 ML/MIN/1.73 M^2
EST. GFR  (NON AFRICAN AMERICAN): 44.3 ML/MIN/1.73 M^2
GLUCOSE SERPL-MCNC: 106 MG/DL (ref 70–110)
HDLC SERPL-MCNC: 44 MG/DL (ref 40–75)
HDLC SERPL: 30.8 % (ref 20–50)
LDLC SERPL CALC-MCNC: 82.6 MG/DL (ref 63–159)
NONHDLC SERPL-MCNC: 99 MG/DL
POTASSIUM SERPL-SCNC: 3.6 MMOL/L (ref 3.5–5.1)
PROT SERPL-MCNC: 7.6 G/DL (ref 6–8.4)
SODIUM SERPL-SCNC: 140 MMOL/L (ref 136–145)
TRIGL SERPL-MCNC: 82 MG/DL (ref 30–150)

## 2021-07-29 PROCEDURE — 83036 HEMOGLOBIN GLYCOSYLATED A1C: CPT | Performed by: INTERNAL MEDICINE

## 2021-07-29 PROCEDURE — 36415 COLL VENOUS BLD VENIPUNCTURE: CPT | Mod: PO | Performed by: INTERNAL MEDICINE

## 2021-07-29 PROCEDURE — 80061 LIPID PANEL: CPT | Performed by: INTERNAL MEDICINE

## 2021-07-29 PROCEDURE — 80053 COMPREHEN METABOLIC PANEL: CPT | Performed by: INTERNAL MEDICINE

## 2021-07-30 ENCOUNTER — TELEPHONE (OUTPATIENT)
Dept: PRIMARY CARE CLINIC | Facility: CLINIC | Age: 76
End: 2021-07-30

## 2021-07-30 DIAGNOSIS — N28.9 RENAL INSUFFICIENCY: ICD-10-CM

## 2021-07-30 DIAGNOSIS — I10 HYPERTENSION, UNSPECIFIED TYPE: Primary | ICD-10-CM

## 2021-07-30 LAB
ESTIMATED AVG GLUCOSE: 128 MG/DL (ref 68–131)
HBA1C MFR BLD: 6.1 % (ref 4–5.6)

## 2021-08-01 ENCOUNTER — CLINICAL SUPPORT (OUTPATIENT)
Dept: URGENT CARE | Facility: CLINIC | Age: 76
End: 2021-08-01
Payer: MEDICARE

## 2021-08-01 ENCOUNTER — NURSE TRIAGE (OUTPATIENT)
Dept: ADMINISTRATIVE | Facility: CLINIC | Age: 76
End: 2021-08-01

## 2021-08-01 DIAGNOSIS — Z20.822 ENCOUNTER FOR LABORATORY TESTING FOR COVID-19 VIRUS: Primary | ICD-10-CM

## 2021-08-01 LAB
CTP QC/QA: YES
SARS-COV-2 RDRP RESP QL NAA+PROBE: NEGATIVE

## 2021-08-01 PROCEDURE — 99211 PR OFFICE/OUTPT VISIT, EST, LEVL I: ICD-10-PCS | Mod: S$GLB,,, | Performed by: NURSE PRACTITIONER

## 2021-08-01 PROCEDURE — 99211 OFF/OP EST MAY X REQ PHY/QHP: CPT | Mod: S$GLB,,, | Performed by: NURSE PRACTITIONER

## 2021-08-01 PROCEDURE — U0002 COVID-19 LAB TEST NON-CDC: HCPCS | Mod: QW,S$GLB,, | Performed by: NURSE PRACTITIONER

## 2021-08-01 PROCEDURE — U0002: ICD-10-PCS | Mod: QW,S$GLB,, | Performed by: NURSE PRACTITIONER

## 2021-09-23 ENCOUNTER — OFFICE VISIT (OUTPATIENT)
Dept: URGENT CARE | Facility: CLINIC | Age: 76
End: 2021-09-23
Payer: MEDICARE

## 2021-09-23 VITALS
DIASTOLIC BLOOD PRESSURE: 77 MMHG | OXYGEN SATURATION: 97 % | HEART RATE: 61 BPM | TEMPERATURE: 98 F | HEIGHT: 62 IN | SYSTOLIC BLOOD PRESSURE: 143 MMHG | RESPIRATION RATE: 19 BRPM | BODY MASS INDEX: 33.1 KG/M2 | WEIGHT: 179.88 LBS

## 2021-09-23 DIAGNOSIS — M62.838 MUSCLE SPASMS OF NECK: Primary | ICD-10-CM

## 2021-09-23 PROCEDURE — 99213 PR OFFICE/OUTPT VISIT, EST, LEVL III, 20-29 MIN: ICD-10-PCS | Mod: S$GLB,,, | Performed by: NURSE PRACTITIONER

## 2021-09-23 PROCEDURE — 99213 OFFICE O/P EST LOW 20 MIN: CPT | Mod: S$GLB,,, | Performed by: NURSE PRACTITIONER

## 2021-09-24 ENCOUNTER — TELEPHONE (OUTPATIENT)
Dept: ORTHOPEDICS | Facility: CLINIC | Age: 76
End: 2021-09-24

## 2021-09-24 ENCOUNTER — PATIENT MESSAGE (OUTPATIENT)
Dept: ORTHOPEDICS | Facility: CLINIC | Age: 76
End: 2021-09-24

## 2021-09-24 DIAGNOSIS — M50.30 DDD (DEGENERATIVE DISC DISEASE), CERVICAL: Primary | ICD-10-CM

## 2021-09-28 ENCOUNTER — OFFICE VISIT (OUTPATIENT)
Dept: ORTHOPEDICS | Facility: CLINIC | Age: 76
End: 2021-09-28
Payer: MEDICARE

## 2021-09-28 ENCOUNTER — HOSPITAL ENCOUNTER (OUTPATIENT)
Dept: RADIOLOGY | Facility: HOSPITAL | Age: 76
Discharge: HOME | End: 2021-09-28
Attending: PHYSICIAN ASSISTANT
Payer: MEDICARE

## 2021-09-28 VITALS — WEIGHT: 178.88 LBS | BODY MASS INDEX: 32.92 KG/M2 | HEIGHT: 62 IN

## 2021-09-28 DIAGNOSIS — M50.30 DDD (DEGENERATIVE DISC DISEASE), CERVICAL: ICD-10-CM

## 2021-09-28 DIAGNOSIS — M54.12 CERVICAL RADICULOPATHY: Primary | ICD-10-CM

## 2021-09-28 PROCEDURE — 99204 PR OFFICE/OUTPT VISIT, NEW, LEVL IV, 45-59 MIN: ICD-10-PCS | Mod: HCNC,S$GLB,, | Performed by: PHYSICIAN ASSISTANT

## 2021-09-28 PROCEDURE — 99999 PR PBB SHADOW E&M-EST. PATIENT-LVL III: ICD-10-PCS | Mod: PBBFAC,HCNC,, | Performed by: PHYSICIAN ASSISTANT

## 2021-09-28 PROCEDURE — 1160F PR REVIEW ALL MEDS BY PRESCRIBER/CLIN PHARMACIST DOCUMENTED: ICD-10-PCS | Mod: HCNC,CPTII,S$GLB, | Performed by: PHYSICIAN ASSISTANT

## 2021-09-28 PROCEDURE — 72050 XR CERVICAL SPINE AP LAT WITH FLEX EXTEN: ICD-10-PCS | Mod: 26,HCNC,, | Performed by: RADIOLOGY

## 2021-09-28 PROCEDURE — 99999 PR PBB SHADOW E&M-EST. PATIENT-LVL III: CPT | Mod: PBBFAC,HCNC,, | Performed by: PHYSICIAN ASSISTANT

## 2021-09-28 PROCEDURE — 72050 X-RAY EXAM NECK SPINE 4/5VWS: CPT | Mod: 26,HCNC,, | Performed by: RADIOLOGY

## 2021-09-28 PROCEDURE — 1125F AMNT PAIN NOTED PAIN PRSNT: CPT | Mod: HCNC,CPTII,S$GLB, | Performed by: PHYSICIAN ASSISTANT

## 2021-09-28 PROCEDURE — 3044F HG A1C LEVEL LT 7.0%: CPT | Mod: HCNC,CPTII,S$GLB, | Performed by: PHYSICIAN ASSISTANT

## 2021-09-28 PROCEDURE — 1159F PR MEDICATION LIST DOCUMENTED IN MEDICAL RECORD: ICD-10-PCS | Mod: HCNC,CPTII,S$GLB, | Performed by: PHYSICIAN ASSISTANT

## 2021-09-28 PROCEDURE — 99204 OFFICE O/P NEW MOD 45 MIN: CPT | Mod: HCNC,S$GLB,, | Performed by: PHYSICIAN ASSISTANT

## 2021-09-28 PROCEDURE — 1160F RVW MEDS BY RX/DR IN RCRD: CPT | Mod: HCNC,CPTII,S$GLB, | Performed by: PHYSICIAN ASSISTANT

## 2021-09-28 PROCEDURE — 1125F PR PAIN SEVERITY QUANTIFIED, PAIN PRESENT: ICD-10-PCS | Mod: HCNC,CPTII,S$GLB, | Performed by: PHYSICIAN ASSISTANT

## 2021-09-28 PROCEDURE — 72050 X-RAY EXAM NECK SPINE 4/5VWS: CPT | Mod: TC,HCNC

## 2021-09-28 PROCEDURE — 3044F PR MOST RECENT HEMOGLOBIN A1C LEVEL <7.0%: ICD-10-PCS | Mod: HCNC,CPTII,S$GLB, | Performed by: PHYSICIAN ASSISTANT

## 2021-09-28 PROCEDURE — 1159F MED LIST DOCD IN RCRD: CPT | Mod: HCNC,CPTII,S$GLB, | Performed by: PHYSICIAN ASSISTANT

## 2021-09-28 RX ORDER — GABAPENTIN 100 MG/1
100 CAPSULE ORAL 3 TIMES DAILY
Qty: 90 CAPSULE | Refills: 0 | Status: SHIPPED | OUTPATIENT
Start: 2021-09-28 | End: 2021-12-13 | Stop reason: SDUPTHER

## 2021-09-28 RX ORDER — TIZANIDINE 4 MG/1
4 TABLET ORAL EVERY 6 HOURS PRN
Qty: 60 TABLET | Refills: 0 | Status: SHIPPED | OUTPATIENT
Start: 2021-09-28 | End: 2021-10-08

## 2021-10-06 ENCOUNTER — HOSPITAL ENCOUNTER (OUTPATIENT)
Dept: RADIOLOGY | Facility: HOSPITAL | Age: 76
Discharge: HOME | End: 2021-10-06
Attending: PHYSICIAN ASSISTANT
Payer: MEDICARE

## 2021-10-06 DIAGNOSIS — M54.12 CERVICAL RADICULOPATHY: ICD-10-CM

## 2021-10-06 PROCEDURE — 72141 MRI NECK SPINE W/O DYE: CPT | Mod: TC,HCNC

## 2021-10-06 PROCEDURE — 72141 MRI NECK SPINE W/O DYE: CPT | Mod: 26,HCNC,, | Performed by: RADIOLOGY

## 2021-10-06 PROCEDURE — 72141 MRI CERVICAL SPINE WITHOUT CONTRAST: ICD-10-PCS | Mod: 26,HCNC,, | Performed by: RADIOLOGY

## 2021-10-11 ENCOUNTER — OFFICE VISIT (OUTPATIENT)
Dept: ORTHOPEDICS | Facility: CLINIC | Age: 76
End: 2021-10-11
Payer: MEDICARE

## 2021-10-11 VITALS — WEIGHT: 174.94 LBS | HEIGHT: 62 IN | BODY MASS INDEX: 32.19 KG/M2

## 2021-10-11 DIAGNOSIS — M54.12 CERVICAL RADICULOPATHY: Primary | ICD-10-CM

## 2021-10-11 PROCEDURE — 99213 OFFICE O/P EST LOW 20 MIN: CPT | Mod: HCNC,S$GLB,, | Performed by: PHYSICIAN ASSISTANT

## 2021-10-11 PROCEDURE — 3044F HG A1C LEVEL LT 7.0%: CPT | Mod: HCNC,CPTII,S$GLB, | Performed by: PHYSICIAN ASSISTANT

## 2021-10-11 PROCEDURE — 1101F PT FALLS ASSESS-DOCD LE1/YR: CPT | Mod: HCNC,CPTII,S$GLB, | Performed by: PHYSICIAN ASSISTANT

## 2021-10-11 PROCEDURE — 1160F RVW MEDS BY RX/DR IN RCRD: CPT | Mod: HCNC,CPTII,S$GLB, | Performed by: PHYSICIAN ASSISTANT

## 2021-10-11 PROCEDURE — 3044F PR MOST RECENT HEMOGLOBIN A1C LEVEL <7.0%: ICD-10-PCS | Mod: HCNC,CPTII,S$GLB, | Performed by: PHYSICIAN ASSISTANT

## 2021-10-11 PROCEDURE — 1101F PR PT FALLS ASSESS DOC 0-1 FALLS W/OUT INJ PAST YR: ICD-10-PCS | Mod: HCNC,CPTII,S$GLB, | Performed by: PHYSICIAN ASSISTANT

## 2021-10-11 PROCEDURE — 3288F FALL RISK ASSESSMENT DOCD: CPT | Mod: HCNC,CPTII,S$GLB, | Performed by: PHYSICIAN ASSISTANT

## 2021-10-11 PROCEDURE — 1159F MED LIST DOCD IN RCRD: CPT | Mod: HCNC,CPTII,S$GLB, | Performed by: PHYSICIAN ASSISTANT

## 2021-10-11 PROCEDURE — 1159F PR MEDICATION LIST DOCUMENTED IN MEDICAL RECORD: ICD-10-PCS | Mod: HCNC,CPTII,S$GLB, | Performed by: PHYSICIAN ASSISTANT

## 2021-10-11 PROCEDURE — 1126F PR PAIN SEVERITY QUANTIFIED, NO PAIN PRESENT: ICD-10-PCS | Mod: HCNC,CPTII,S$GLB, | Performed by: PHYSICIAN ASSISTANT

## 2021-10-11 PROCEDURE — 99999 PR PBB SHADOW E&M-EST. PATIENT-LVL III: CPT | Mod: PBBFAC,HCNC,, | Performed by: PHYSICIAN ASSISTANT

## 2021-10-11 PROCEDURE — 1126F AMNT PAIN NOTED NONE PRSNT: CPT | Mod: HCNC,CPTII,S$GLB, | Performed by: PHYSICIAN ASSISTANT

## 2021-10-11 PROCEDURE — 99999 PR PBB SHADOW E&M-EST. PATIENT-LVL III: ICD-10-PCS | Mod: PBBFAC,HCNC,, | Performed by: PHYSICIAN ASSISTANT

## 2021-10-11 PROCEDURE — 3288F PR FALLS RISK ASSESSMENT DOCUMENTED: ICD-10-PCS | Mod: HCNC,CPTII,S$GLB, | Performed by: PHYSICIAN ASSISTANT

## 2021-10-11 PROCEDURE — 1160F PR REVIEW ALL MEDS BY PRESCRIBER/CLIN PHARMACIST DOCUMENTED: ICD-10-PCS | Mod: HCNC,CPTII,S$GLB, | Performed by: PHYSICIAN ASSISTANT

## 2021-10-11 PROCEDURE — 99213 PR OFFICE/OUTPT VISIT, EST, LEVL III, 20-29 MIN: ICD-10-PCS | Mod: HCNC,S$GLB,, | Performed by: PHYSICIAN ASSISTANT

## 2021-10-11 RX ORDER — TIZANIDINE 4 MG/1
4 TABLET ORAL EVERY 6 HOURS PRN
COMMUNITY
End: 2022-08-10

## 2021-10-12 ENCOUNTER — TELEPHONE (OUTPATIENT)
Dept: ORTHOPEDICS | Facility: CLINIC | Age: 76
End: 2021-10-12
Payer: MEDICARE

## 2021-10-12 NOTE — TELEPHONE ENCOUNTER
----- Message from Michell Stephens sent at 10/12/2021  8:10 AM CDT -----  Ivory Johnson calling regarding stated the Physical Therapist need a copy of the order. Can you fax it to [phone]. Pt call back # [phone].   Pt have a PT appt today, 9:00. Can you fax it before her appt.

## 2021-10-12 NOTE — TELEPHONE ENCOUNTER
Left message for pt advising that I have just faxed her orders over to the number given so she should be good to go for her appt today.

## 2021-12-13 DIAGNOSIS — M54.12 CERVICAL RADICULOPATHY: Primary | ICD-10-CM

## 2021-12-13 RX ORDER — GABAPENTIN 100 MG/1
100 CAPSULE ORAL 3 TIMES DAILY
Qty: 90 CAPSULE | Refills: 0 | Status: SHIPPED | OUTPATIENT
Start: 2021-12-13 | End: 2022-02-25

## 2022-01-10 ENCOUNTER — IMMUNIZATION (OUTPATIENT)
Dept: PHARMACY | Facility: CLINIC | Age: 77
End: 2022-01-10
Payer: MEDICARE

## 2022-01-10 DIAGNOSIS — Z23 NEED FOR VACCINATION: Primary | ICD-10-CM

## 2022-01-12 NOTE — PROGRESS NOTES
"[facility] Note    Chief Complaint      Chief Complaint   Patient presents with    Follow-up       History of Present Illness      Jasmin Alfaro is a 76 y.o.  AAF with HTN, HLD, AR, and Anxiety presents to  to fu chronic issues.  Last visit -4/29/21.      Cervical Radiculopathy - MRI C-spine - 10/6/21- Spondylosis of the cervical spine, there is no severe canal stenosis. Fu by Ortho. Pt on Gabapentin prn.     Cisco Heel pain - She c/o Cisco heel pain.  Xray 4/29/21 - Achilles enthesopathy and plantar calcaneal spur noted bilaterally. Gel insert helps. Calf pain improved with stretching. She tries to walk.  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 "It's not as bad".      Chronic Left Knee pain -She has had this pain "for a couple yrs".  Tylenol - it helps a little but not much. It can be 2-8/10 in severity if she is on her legs too long.   No trauma. No swelling.  No erythema, or warmth.  No h/o Gout.   Cisco Knee Xray - 4/29/21 -  Moderate loss of tibiofemoral cartilage space with osteophyte production.  She has trouble going up stairs.  Her Rt knee gives out/mahad.  She would like a letter so she can get an apartment on the 1st floor in a senior Independent Living facility. She uses Aleve sparingly and will take with Pepcid to protect her stomach.  She will alert me for any GI S.E.      Prediabetes - HA1c -6.1. Rec low carb diet.  Cont to monitor.      HTN - Controlled on Atenolol 50 mg/d, Amlodipine 10 mg/d, HCTZ 25 mg QAm.     HLD - Pt controlled on Crestor 5 mg QHS in July.      AR -Pt on Claritin prn and Flonase daily.      Anxiety - Pt doing well off Lexapro 10 mg/d due to wt gain. Stable off meds.        GERD - Pt on Famotidine prn.  Rec Reflux prec.      MNG - Pt prev fu by ENT, Dr. Patel.  She then fu for a second opinion at [facility] with Dr. Robyn Mann. She is now fu by Dr. Romero. Thyroid U/S - 11/13/20 - repeat 1-2 yr. Due for fu.      Osteopenia - h/o Rt humeral neck " fracture.  She has not been able to go the gym due to the pandemic.  She tries to walk. Cont Calcium and Vit d.     Ingrown Toenail - Will refer to Podiatry, Dr. Oconnor.  Pt mentioned to nurse after visit was completed.      H/O PE - '74- She was tx at the time.  Pt no longer on Anticoagulation.      HCM - Flu - none - pt defers;  Tdap - ?< 10 yrs;  PCV 13 - 6/2/20;  PVX 23 - 7/14/21;  Shingrix - none- declines due to finances;  COVID 19 Vaccine #1 - 3/3/21; #2 - 3/26/21; # 3 - 1/10/22;  MGM -6/18/21- repeat 1 yr;  DEXA - 11/13/20 - + Osteopenia;  PAP - no longer gets; Hep C Screen - neg - 6/2/20; C-scope - 10/15/20  +Polyps- told to repeat in 3 yrs; GI - Dr. Sanchez;  Prev PCP - me then Dr. Kennedy; Ortho - Dr. Rodrigues; Ophtho - ; Endo - Dr. Romero    Patient Care Team:  Gloria Smith MD as PCP - General (Internal Medicine)  Izzy Alfaro MA as Care Coordinator     Health Maintenance:  Immunization History   Administered Date(s) Administered    COVID-19 MRNA, LN-S PF (MODERNA HALF 0.25 ML DOSE) 01/10/2022    COVID-19, MRNA, LN-S, PF (Pfizer) 03/03/2021, 03/26/2021    Pneumococcal Conjugate - 13 Valent 06/02/2020    Pneumococcal Polysaccharide - 23 Valent 07/14/2021      Health Maintenance   Topic Date Due    TETANUS VACCINE  Never done    DEXA SCAN  11/13/2023    Lipid Panel  07/29/2026    Hepatitis C Screening  Completed        Past Medical History:  Past Medical History:   Diagnosis Date    Allergic rhinitis     Anxiety     Colon polyps     COVID-19 11/25/2020    Hyperlipidemia     Hypertension     Multiple thyroid nodules        Past Surgical History:   has a past surgical history that includes Hysterectomy; Appendectomy; Incisional hernia repair; Laparotomy; and Biopsy of thyroid.    Family History:  family history includes Cancer in her mother; Colon cancer in her brother; Heart failure in her brother and mother; Hypertension in her brother, father, mother, and  sister; Kidney failure in her brother and sister.     Social History:  Social History     Tobacco Use    Smoking status: Never Smoker    Smokeless tobacco: Never Used   Substance Use Topics    Alcohol use: Not Currently    Drug use: Never       Review of Systems   Constitutional: Negative for chills and fever.   HENT: Positive for sore throat. Negative for nasal congestion.         Off and on   Eyes: Negative for visual disturbance.   Respiratory: Negative for cough and chest tightness.    Cardiovascular: Negative for chest pain.   Gastrointestinal: Negative for abdominal pain, constipation, diarrhea, nausea and vomiting.   Genitourinary: Negative for dysuria and frequency.   Musculoskeletal: Positive for arthralgias. Negative for myalgias.        Knees   Neurological: Positive for numbness. Negative for dizziness, weakness and headaches.        Rt hand   Psychiatric/Behavioral: Negative for dysphoric mood. The patient is not nervous/anxious.         Medications:    Current Outpatient Medications:     aspirin 81 MG Chew, Take 81 mg by mouth once daily. , Disp: , Rfl:     biotin 1 mg Cap, Take 1,000 mcg by mouth once daily. , Disp: , Rfl:     diclofenac sodium (VOLTAREN) 1 % Gel, Apply 2 g topically 4 (four) times daily., Disp: 1 Tube, Rfl: 3    EPINEPHrine (EPIPEN 2-YAA) 0.3 mg/0.3 mL AtIn, Inject 0.3 mLs (0.3 mg total) into the muscle as needed., Disp: 1 Device, Rfl: 0    fluticasone propionate (FLONASE) 50 mcg/actuation nasal spray, USE 2 SPRAYS IN EACH NOSTRIL EVERY DAY, Disp: 48 g, Rfl: 1    gabapentin (NEURONTIN) 100 MG capsule, Take 1 capsule (100 mg total) by mouth 3 (three) times daily., Disp: 90 capsule, Rfl: 0    loratadine (CLARITIN) 10 mg tablet, Take 10 mg by mouth daily as needed. , Disp: , Rfl:     multivit with minerals/lutein (MULTIVITAMIN 50 PLUS ORAL), Take by mouth once daily. , Disp: , Rfl:     neomycin-polymyxin-dexamethasone (DEXACINE) 3.5 mg/g-10,000 unit/g-0.1 % Oint, 1/2 INCH  "LEFT EYE TWICE A DAY FOR 10 14 DAYS, Disp: , Rfl:     tiZANidine (ZANAFLEX) 4 MG tablet, Take 4 mg by mouth every 6 (six) hours as needed., Disp: , Rfl:     triamcinolone acetonide 0.1% (KENALOG) 0.1 % cream, Apply topically 2 (two) times daily., Disp: 80 g, Rfl: 0    amLODIPine (NORVASC) 10 MG tablet, Take 1 tablet (10 mg total) by mouth once daily., Disp: 90 tablet, Rfl: 1    atenoloL (TENORMIN) 50 MG tablet, Take 1 tablet (50 mg total) by mouth once daily., Disp: 90 tablet, Rfl: 1    famotidine (PEPCID) 40 MG tablet, Take 1 tablet (40 mg total) by mouth once daily., Disp: 90 tablet, Rfl: 0    hydroCHLOROthiazide (HYDRODIURIL) 25 MG tablet, Take 1 tablet (25 mg total) by mouth once daily., Disp: 90 tablet, Rfl: 1    rosuvastatin (CRESTOR) 5 MG tablet, Take 1 tablet (5 mg total) by mouth once daily., Disp: 90 tablet, Rfl: 1     Allergies:  Review of patient's allergies indicates:   Allergen Reactions    Meperidine Hives and Other (See Comments)     Unknown reaction         Physical Exam      Vital Signs  Temp: 97.8 °F (36.6 °C)  Pulse: 66  Resp: 18  SpO2: 98 %  BP: 127/77  BP Location: Right arm  Patient Position: Sitting  Pain Score: 0-No pain  Height and Weight  Height: 5' 2" (157.5 cm)  Weight: 78 kg (171 lb 15.3 oz)  BSA (Calculated - sq m): 1.85 sq meters  BMI (Calculated): 31.4  Weight in (lb) to have BMI = 25: 136.4      Patient Position: Sitting      Physical Exam  Vitals reviewed.   Constitutional:       Appearance: Normal appearance. She is not diaphoretic.   HENT:      Head: Normocephalic and atraumatic.   Eyes:      Extraocular Movements: Extraocular movements intact.      Pupils: Pupils are equal, round, and reactive to light.      Comments: Left cataracts; demetrio arcus senilis   Cardiovascular:      Rate and Rhythm: Normal rate and regular rhythm.      Pulses: Normal pulses.      Heart sounds: Normal heart sounds.   Pulmonary:      Effort: Pulmonary effort is normal. No respiratory distress.      " Breath sounds: Normal breath sounds.   Abdominal:      General: Bowel sounds are normal. There is no distension.      Palpations: Abdomen is soft.      Tenderness: There is no abdominal tenderness. There is no guarding or rebound.   Musculoskeletal:         General: Normal range of motion.   Skin:     General: Skin is warm and dry.   Neurological:      General: No focal deficit present.      Mental Status: She is alert and oriented to person, place, and time.   Psychiatric:         Mood and Affect: Mood normal.         Behavior: Behavior normal.          Laboratory:     CMP:  Recent Labs   Lab 06/02/20 1117 07/12/21  0829 07/29/21 0919   Glucose 111 H   < > 106   Calcium 10.1   < > 10.2   Albumin 4.5  --  3.9   Total Protein 8.1  --  7.6   Sodium 136   < > 140   Potassium 3.9   < > 3.6   CO2 28   < > 27   Chloride 100   < > 104   BUN 16   < > 21   Creatinine 1.1   < > 1.2   Alkaline Phosphatase 80  --  69   ALT 19  --  20   AST 28  --  30   Total Bilirubin 0.5  --  0.6    < > = values in this interval not displayed.         URINALYSIS:  Recent Labs   Lab 03/02/20  1553   Color, UA yellow   Spec Grav UA 1.005   pH, UA 7   Nitrite, UA negative   Urobilinogen, UA normal        LIPIDS:  Recent Labs   Lab 06/02/20  1117 10/01/20  0925 07/29/21  0919   TSH  --  1.549  --    HDL 53  --  44   Cholesterol 149  --  143   Triglycerides 83  --  82   LDL Cholesterol 79.4  --  82.6   HDL/Cholesterol Ratio 35.6  --  30.8   Non-HDL Cholesterol 96  --  99   Total Cholesterol/HDL Ratio 2.8  --  3.3       TSH:  Recent Labs   Lab 10/01/20  0925   TSH 1.549       A1C:  Recent Labs   Lab 10/01/20  0925 07/29/21  0919   Hemoglobin A1C 6.2 H 6.1 H       Other:   Recent Labs   Lab 10/01/20  0925   Vit D, 25-Hydroxy 37     Recent Labs   Lab 06/02/20 1117   Hepatitis C Ab Negative       Assessment/Plan     Jasmin Alfaro is a 76 y.o.female with:    Hypertension, unspecified type  -     amLODIPine (NORVASC) 10 MG tablet; Take 1 tablet (10 mg  total) by mouth once daily.  Dispense: 90 tablet; Refill: 1  -     atenoloL (TENORMIN) 50 MG tablet; Take 1 tablet (50 mg total) by mouth once daily.  Dispense: 90 tablet; Refill: 1  -     hydroCHLOROthiazide (HYDRODIURIL) 25 MG tablet; Take 1 tablet (25 mg total) by mouth once daily.  Dispense: 90 tablet; Refill: 1  -     Basic Metabolic Panel; Future; Expected date: 01/13/2022  - Controlled.  Cont current.     Hyperlipidemia, unspecified hyperlipidemia type  -     rosuvastatin (CRESTOR) 5 MG tablet; Take 1 tablet (5 mg total) by mouth once daily.  Dispense: 90 tablet; Refill: 1  - Controlled.  Cont current.     Multiple thyroid nodules  -     TSH; Future; Expected date: 01/13/2022  - Due for repeat U/S and fu with Dr. Romero.  Will order TSH.     Prediabetes  - Cont low carb diet.  Repeat Ha1c at next visit.     Gastroesophageal reflux disease, unspecified whether esophagitis present  -     famotidine (PEPCID) 40 MG tablet; Take 1 tablet (40 mg total) by mouth once daily.  Dispense: 90 tablet; Refill: 0  - Stable.  Cont current regimen.    Osteoarthritis, unspecified osteoarthritis type, unspecified site  - Stable.  Cont current regimen.    Osteopenia, unspecified location  - Stable.  Cont current regimen.    Screening mammogram, encounter for  -     Mammo Digital Screening Bilat w/ Karan; Future; Expected date: 06/19/2022         Chronic conditions status updated as per HPI.  Other than changes above, cont current medications and maintain follow up with specialists. Follow up in about 6 months (around 7/13/2022) for fu chronic issues or sooner if needed.      Gloria Smith MD  [facility]

## 2022-01-13 ENCOUNTER — OFFICE VISIT (OUTPATIENT)
Dept: PRIMARY CARE CLINIC | Facility: CLINIC | Age: 77
End: 2022-01-13
Payer: MEDICARE

## 2022-01-13 VITALS
SYSTOLIC BLOOD PRESSURE: 127 MMHG | HEART RATE: 66 BPM | OXYGEN SATURATION: 98 % | WEIGHT: 171.94 LBS | BODY MASS INDEX: 31.64 KG/M2 | DIASTOLIC BLOOD PRESSURE: 77 MMHG | TEMPERATURE: 98 F | HEIGHT: 62 IN | RESPIRATION RATE: 18 BRPM

## 2022-01-13 DIAGNOSIS — E04.2 MULTIPLE THYROID NODULES: ICD-10-CM

## 2022-01-13 DIAGNOSIS — Z12.31 SCREENING MAMMOGRAM, ENCOUNTER FOR: ICD-10-CM

## 2022-01-13 DIAGNOSIS — I10 HYPERTENSION, UNSPECIFIED TYPE: Primary | ICD-10-CM

## 2022-01-13 DIAGNOSIS — E78.5 HYPERLIPIDEMIA, UNSPECIFIED HYPERLIPIDEMIA TYPE: ICD-10-CM

## 2022-01-13 DIAGNOSIS — L60.0 INGROWN TOENAIL: ICD-10-CM

## 2022-01-13 DIAGNOSIS — M19.90 OSTEOARTHRITIS, UNSPECIFIED OSTEOARTHRITIS TYPE, UNSPECIFIED SITE: ICD-10-CM

## 2022-01-13 DIAGNOSIS — M85.80 OSTEOPENIA, UNSPECIFIED LOCATION: ICD-10-CM

## 2022-01-13 DIAGNOSIS — K21.9 GASTROESOPHAGEAL REFLUX DISEASE, UNSPECIFIED WHETHER ESOPHAGITIS PRESENT: ICD-10-CM

## 2022-01-13 DIAGNOSIS — R73.03 PREDIABETES: ICD-10-CM

## 2022-01-13 PROCEDURE — 1126F AMNT PAIN NOTED NONE PRSNT: CPT | Mod: HCNC,CPTII,S$GLB, | Performed by: INTERNAL MEDICINE

## 2022-01-13 PROCEDURE — 3288F PR FALLS RISK ASSESSMENT DOCUMENTED: ICD-10-PCS | Mod: HCNC,CPTII,S$GLB, | Performed by: INTERNAL MEDICINE

## 2022-01-13 PROCEDURE — 3078F PR MOST RECENT DIASTOLIC BLOOD PRESSURE < 80 MM HG: ICD-10-PCS | Mod: HCNC,CPTII,S$GLB, | Performed by: INTERNAL MEDICINE

## 2022-01-13 PROCEDURE — 1160F RVW MEDS BY RX/DR IN RCRD: CPT | Mod: HCNC,CPTII,S$GLB, | Performed by: INTERNAL MEDICINE

## 2022-01-13 PROCEDURE — 3074F SYST BP LT 130 MM HG: CPT | Mod: HCNC,CPTII,S$GLB, | Performed by: INTERNAL MEDICINE

## 2022-01-13 PROCEDURE — 3288F FALL RISK ASSESSMENT DOCD: CPT | Mod: HCNC,CPTII,S$GLB, | Performed by: INTERNAL MEDICINE

## 2022-01-13 PROCEDURE — 1101F PT FALLS ASSESS-DOCD LE1/YR: CPT | Mod: HCNC,CPTII,S$GLB, | Performed by: INTERNAL MEDICINE

## 2022-01-13 PROCEDURE — 1160F PR REVIEW ALL MEDS BY PRESCRIBER/CLIN PHARMACIST DOCUMENTED: ICD-10-PCS | Mod: HCNC,CPTII,S$GLB, | Performed by: INTERNAL MEDICINE

## 2022-01-13 PROCEDURE — 1159F PR MEDICATION LIST DOCUMENTED IN MEDICAL RECORD: ICD-10-PCS | Mod: HCNC,CPTII,S$GLB, | Performed by: INTERNAL MEDICINE

## 2022-01-13 PROCEDURE — 1126F PR PAIN SEVERITY QUANTIFIED, NO PAIN PRESENT: ICD-10-PCS | Mod: HCNC,CPTII,S$GLB, | Performed by: INTERNAL MEDICINE

## 2022-01-13 PROCEDURE — 99214 OFFICE O/P EST MOD 30 MIN: CPT | Mod: HCNC,S$GLB,, | Performed by: INTERNAL MEDICINE

## 2022-01-13 PROCEDURE — 1101F PR PT FALLS ASSESS DOC 0-1 FALLS W/OUT INJ PAST YR: ICD-10-PCS | Mod: HCNC,CPTII,S$GLB, | Performed by: INTERNAL MEDICINE

## 2022-01-13 PROCEDURE — 1159F MED LIST DOCD IN RCRD: CPT | Mod: HCNC,CPTII,S$GLB, | Performed by: INTERNAL MEDICINE

## 2022-01-13 PROCEDURE — 99999 PR PBB SHADOW E&M-EST. PATIENT-LVL V: ICD-10-PCS | Mod: PBBFAC,HCNC,, | Performed by: INTERNAL MEDICINE

## 2022-01-13 PROCEDURE — 3078F DIAST BP <80 MM HG: CPT | Mod: HCNC,CPTII,S$GLB, | Performed by: INTERNAL MEDICINE

## 2022-01-13 PROCEDURE — 99214 PR OFFICE/OUTPT VISIT, EST, LEVL IV, 30-39 MIN: ICD-10-PCS | Mod: HCNC,S$GLB,, | Performed by: INTERNAL MEDICINE

## 2022-01-13 PROCEDURE — 3074F PR MOST RECENT SYSTOLIC BLOOD PRESSURE < 130 MM HG: ICD-10-PCS | Mod: HCNC,CPTII,S$GLB, | Performed by: INTERNAL MEDICINE

## 2022-01-13 PROCEDURE — 99999 PR PBB SHADOW E&M-EST. PATIENT-LVL V: CPT | Mod: PBBFAC,HCNC,, | Performed by: INTERNAL MEDICINE

## 2022-01-13 RX ORDER — ATENOLOL 50 MG/1
50 TABLET ORAL DAILY
Qty: 90 TABLET | Refills: 1 | Status: SHIPPED | OUTPATIENT
Start: 2022-01-13 | End: 2022-07-14 | Stop reason: SDUPTHER

## 2022-01-13 RX ORDER — AMLODIPINE BESYLATE 10 MG/1
10 TABLET ORAL DAILY
Qty: 90 TABLET | Refills: 1 | Status: SHIPPED | OUTPATIENT
Start: 2022-01-13 | End: 2022-07-14 | Stop reason: SDUPTHER

## 2022-01-13 RX ORDER — HYDROCHLOROTHIAZIDE 25 MG/1
25 TABLET ORAL DAILY
Qty: 90 TABLET | Refills: 1 | Status: SHIPPED | OUTPATIENT
Start: 2022-01-13 | End: 2022-07-14 | Stop reason: SDUPTHER

## 2022-01-13 RX ORDER — ROSUVASTATIN CALCIUM 5 MG/1
5 TABLET, COATED ORAL DAILY
Qty: 90 TABLET | Refills: 1 | Status: SHIPPED | OUTPATIENT
Start: 2022-01-13 | End: 2022-07-14 | Stop reason: SDUPTHER

## 2022-01-13 RX ORDER — FAMOTIDINE 40 MG/1
40 TABLET, FILM COATED ORAL DAILY
Qty: 90 TABLET | Refills: 0 | Status: SHIPPED | OUTPATIENT
Start: 2022-01-13 | End: 2022-07-14 | Stop reason: SDUPTHER

## 2022-01-21 ENCOUNTER — OFFICE VISIT (OUTPATIENT)
Dept: PODIATRY | Facility: CLINIC | Age: 77
End: 2022-01-21
Payer: MEDICARE

## 2022-01-21 VITALS
WEIGHT: 189.38 LBS | SYSTOLIC BLOOD PRESSURE: 114 MMHG | DIASTOLIC BLOOD PRESSURE: 63 MMHG | HEART RATE: 67 BPM | BODY MASS INDEX: 34.64 KG/M2

## 2022-01-21 DIAGNOSIS — L60.0 INGROWN TOENAIL: ICD-10-CM

## 2022-01-21 PROCEDURE — 99203 OFFICE O/P NEW LOW 30 MIN: CPT | Mod: HCNC,S$GLB,, | Performed by: PODIATRIST

## 2022-01-21 PROCEDURE — 99999 PR PBB SHADOW E&M-EST. PATIENT-LVL III: CPT | Mod: PBBFAC,HCNC,, | Performed by: PODIATRIST

## 2022-01-21 PROCEDURE — 99203 PR OFFICE/OUTPT VISIT, NEW, LEVL III, 30-44 MIN: ICD-10-PCS | Mod: HCNC,S$GLB,, | Performed by: PODIATRIST

## 2022-01-21 PROCEDURE — 1159F PR MEDICATION LIST DOCUMENTED IN MEDICAL RECORD: ICD-10-PCS | Mod: HCNC,CPTII,S$GLB, | Performed by: PODIATRIST

## 2022-01-21 PROCEDURE — 3074F SYST BP LT 130 MM HG: CPT | Mod: HCNC,CPTII,S$GLB, | Performed by: PODIATRIST

## 2022-01-21 PROCEDURE — 3074F PR MOST RECENT SYSTOLIC BLOOD PRESSURE < 130 MM HG: ICD-10-PCS | Mod: HCNC,CPTII,S$GLB, | Performed by: PODIATRIST

## 2022-01-21 PROCEDURE — 3078F PR MOST RECENT DIASTOLIC BLOOD PRESSURE < 80 MM HG: ICD-10-PCS | Mod: HCNC,CPTII,S$GLB, | Performed by: PODIATRIST

## 2022-01-21 PROCEDURE — 1126F AMNT PAIN NOTED NONE PRSNT: CPT | Mod: HCNC,CPTII,S$GLB, | Performed by: PODIATRIST

## 2022-01-21 PROCEDURE — 1159F MED LIST DOCD IN RCRD: CPT | Mod: HCNC,CPTII,S$GLB, | Performed by: PODIATRIST

## 2022-01-21 PROCEDURE — 99999 PR PBB SHADOW E&M-EST. PATIENT-LVL III: ICD-10-PCS | Mod: PBBFAC,HCNC,, | Performed by: PODIATRIST

## 2022-01-21 PROCEDURE — 3078F DIAST BP <80 MM HG: CPT | Mod: HCNC,CPTII,S$GLB, | Performed by: PODIATRIST

## 2022-01-21 PROCEDURE — 1126F PR PAIN SEVERITY QUANTIFIED, NO PAIN PRESENT: ICD-10-PCS | Mod: HCNC,CPTII,S$GLB, | Performed by: PODIATRIST

## 2022-01-21 NOTE — PROGRESS NOTES
Subjective:      Patient ID: Jasmin Alfaro is a 76 y.o. female.    Chief Complaint:   Ingrown Toenail    Jasmin is a 76 y.o. female who presents to the clinic complaining of painful ingrown toenail on the left foot.   Patient relates that she noticed some pain and the outside border of the left big toe nail corner few weeks ago.  Denies any injury.  She does get occasional pedicures but nothing recently.  She has been soaking her foot in salt water and it feels better.  Patient also relates she does not like the way her nails look she has some thickening and discoloration of the right 4th toenail as well as the big toes.  Denies any pain to the other toenails.    Patient relates when she was told when she worked for [facility] that it was recommended to have surgery on her right 4th toe and or toenail however she relates she has not had any pain so she did not proceed with that.    No other foot complaints  Past Medical History:   Diagnosis Date    Allergic rhinitis     Anxiety     Colon polyps     COVID-19 11/25/2020    Hyperlipidemia     Hypertension     Multiple thyroid nodules      Past Surgical History:   Procedure Laterality Date    APPENDECTOMY      BIOPSY OF THYROID      HYSTERECTOMY      INCISIONAL HERNIA REPAIR      LAPAROTOMY       Current Outpatient Medications on File Prior to Visit   Medication Sig Dispense Refill    amLODIPine (NORVASC) 10 MG tablet Take 1 tablet (10 mg total) by mouth once daily. 90 tablet 1    aspirin 81 MG Chew Take 81 mg by mouth once daily.       atenoloL (TENORMIN) 50 MG tablet Take 1 tablet (50 mg total) by mouth once daily. 90 tablet 1    biotin 1 mg Cap Take 1,000 mcg by mouth once daily.       diclofenac sodium (VOLTAREN) 1 % Gel Apply 2 g topically 4 (four) times daily. 1 Tube 3    EPINEPHrine (EPIPEN 2-YAA) 0.3 mg/0.3 mL AtIn Inject 0.3 mLs (0.3 mg total) into the muscle as needed. 1 Device 0    famotidine (PEPCID) 40 MG tablet Take 1 tablet (40 mg total) by  mouth once daily. 90 tablet 0    fluticasone propionate (FLONASE) 50 mcg/actuation nasal spray USE 2 SPRAYS IN EACH NOSTRIL EVERY DAY 48 g 1    gabapentin (NEURONTIN) 100 MG capsule Take 1 capsule (100 mg total) by mouth 3 (three) times daily. 90 capsule 0    hydroCHLOROthiazide (HYDRODIURIL) 25 MG tablet Take 1 tablet (25 mg total) by mouth once daily. 90 tablet 1    loratadine (CLARITIN) 10 mg tablet Take 10 mg by mouth daily as needed.       multivit with minerals/lutein (MULTIVITAMIN 50 PLUS ORAL) Take by mouth once daily.       rosuvastatin (CRESTOR) 5 MG tablet Take 1 tablet (5 mg total) by mouth once daily. 90 tablet 1    tiZANidine (ZANAFLEX) 4 MG tablet Take 4 mg by mouth every 6 (six) hours as needed.      neomycin-polymyxin-dexamethasone (DEXACINE) 3.5 mg/g-10,000 unit/g-0.1 % Oint 1/2 INCH LEFT EYE TWICE A DAY FOR 10 14 DAYS      triamcinolone acetonide 0.1% (KENALOG) 0.1 % cream Apply topically 2 (two) times daily. 80 g 0     No current facility-administered medications on file prior to visit.     Review of patient's allergies indicates:   Allergen Reactions    Meperidine Hives and Other (See Comments)     Unknown reaction         Review of Systems   Constitutional: Negative for chills, decreased appetite, fever, malaise/fatigue, night sweats, weight gain and weight loss.   Cardiovascular: Negative for chest pain, claudication, dyspnea on exertion, leg swelling, palpitations and syncope.   Respiratory: Negative for cough and shortness of breath.    Endocrine: Negative for cold intolerance and heat intolerance.   Hematologic/Lymphatic: Negative for bleeding problem. Does not bruise/bleed easily.   Skin: Positive for nail changes. Negative for color change, dry skin, flushing, itching, poor wound healing, rash, skin cancer, suspicious lesions and unusual hair distribution.   Musculoskeletal: Negative for arthritis, back pain, falls, gout, joint pain, joint swelling, muscle cramps, muscle  weakness, myalgias, neck pain and stiffness.   Gastrointestinal: Negative for diarrhea, nausea and vomiting.   Neurological: Negative for dizziness, focal weakness, light-headedness, numbness, paresthesias, tremors, vertigo and weakness.   Psychiatric/Behavioral: Negative for altered mental status and depression. The patient does not have insomnia.    Allergic/Immunologic: Negative.            Objective:       Vitals:    01/21/22 0740   BP: 114/63   Pulse: 67   Weight: 85.9 kg (189 lb 6 oz)   PainSc: 0-No pain   85.9 kg (189 lb 6 oz)     Physical Exam  Vitals reviewed.   Constitutional:       General: She is not in acute distress.     Appearance: She is well-developed. She is not ill-appearing, toxic-appearing or diaphoretic.      Comments: Proper supportive shoegear      Cardiovascular:      Pulses:           Dorsalis pedis pulses are 2+ on the right side and 2+ on the left side.        Posterior tibial pulses are 1+ on the right side and 1+ on the left side.   Musculoskeletal:         General: No tenderness.      Right lower leg: No edema.      Left lower leg: No edema.      Right ankle:      Right Achilles Tendon: Normal. No tenderness.      Left ankle:      Left Achilles Tendon: Normal. No tenderness.      Right foot: Decreased range of motion. Deformity and bunion present.      Left foot: Decreased range of motion. Deformity and bunion present.      Comments: Mild pain to lateral distal border of left hallux nail no pain on palpation to digits   Feet:      Right foot:      Protective Sensation: 10 sites tested. 10 sites sensed.      Skin integrity: No ulcer, blister, skin breakdown, erythema, warmth, callus or dry skin.      Toenail Condition: Right toenails are abnormally thick. Fungal disease present.     Left foot:      Protective Sensation: 10 sites tested. 10 sites sensed.      Skin integrity: No ulcer, blister, skin breakdown, erythema, warmth, callus or dry skin.      Toenail Condition: Left toenails are  abnormally thick and ingrown.      Comments: Right 4th digit fungal elements no signs of infection    Lateral hallux nail margin of left foot with ingrown nail plate.  No erythema and no edema is noted there is no soi    Other nails mild discoloration thickness distally    Multiple nevi  Skin:     General: Skin is warm.      Capillary Refill: Capillary refill takes 2 to 3 seconds.      Coloration: Skin is not pale.      Findings: No erythema or rash.   Neurological:      Mental Status: She is alert and oriented to person, place, and time.      Sensory: No sensory deficit.      Gait: Gait is intact.   Psychiatric:         Attention and Perception: Attention normal.         Mood and Affect: Mood normal.         Speech: Speech normal.         Behavior: Behavior normal.         Thought Content: Thought content normal.         Cognition and Memory: Cognition normal.         Judgment: Judgment normal.               Assessment:       Encounter Diagnosis   Name Primary?    Ingrown toenail          Plan:       Jasmin was seen today for ingrown toenail.    Diagnoses and all orders for this visit:    Ingrown toenail  -     Ambulatory referral/consult to Podiatry      I counseled the patient on her conditions, their implications and medical management.        Utilizing sterile toenail clippers I aggressively debrided the offending nail border approximately 3 mm from its edge and carried the nail plate incision down at an angle in order to wedge out the offending cryptotic portion of the nail plate. The offending border was then removed in toto. The area was cleansed with alcohol. Patient tolerated the procedure well and related significant relief.    Recommend patient have a Dermatology consult for mole nevi check      Recommend Vicks vapor rub    Continue proper shoes    Recommend follow-up for possible permanent ingrown toenail procedure left foot    No follow-ups on file.

## 2022-01-22 LAB
BUN SERPL-MCNC: 22 MG/DL (ref 7–25)
BUN/CREAT SERPL: 21 (CALC) (ref 6–22)
CALCIUM SERPL-MCNC: 9.5 MG/DL (ref 8.6–10.4)
CHLORIDE SERPL-SCNC: 97 MMOL/L (ref 98–110)
CO2 SERPL-SCNC: 26 MMOL/L (ref 20–32)
CREAT SERPL-MCNC: 1.05 MG/DL (ref 0.6–0.93)
GLUCOSE SERPL-MCNC: 159 MG/DL (ref 65–99)
POTASSIUM SERPL-SCNC: 4 MMOL/L (ref 3.5–5.3)
SODIUM SERPL-SCNC: 135 MMOL/L (ref 135–146)
TSH SERPL-ACNC: 2.23 MIU/L (ref 0.4–4.5)

## 2022-01-24 ENCOUNTER — TELEPHONE (OUTPATIENT)
Dept: PRIMARY CARE CLINIC | Facility: CLINIC | Age: 77
End: 2022-01-24
Payer: MEDICARE

## 2022-01-24 NOTE — PROGRESS NOTES
I sent pt a my chart message -  I reviewed your labs.  Your thyroid functions are normal. Your kidney function was stable. Your glucose was high if you were fasting. Continue a low carb diet.  No further recommendations at this time.    Dr. GALVAN

## 2022-02-15 ENCOUNTER — TELEPHONE (OUTPATIENT)
Dept: PRIMARY CARE CLINIC | Facility: CLINIC | Age: 77
End: 2022-02-15
Payer: MEDICARE

## 2022-02-15 ENCOUNTER — TELEPHONE (OUTPATIENT)
Dept: ORTHOPEDICS | Facility: CLINIC | Age: 77
End: 2022-02-15
Payer: MEDICARE

## 2022-02-15 DIAGNOSIS — M54.12 CERVICAL RADICULOPATHY: Primary | ICD-10-CM

## 2022-02-15 NOTE — TELEPHONE ENCOUNTER
Pt sees  for her back. She is currently not in until March. Pt needs a referral to another provider.

## 2022-02-15 NOTE — TELEPHONE ENCOUNTER
Spoke with patient and informed her NELSY No is currently out of the office and her next open appointment will be 3/8/22, patient will ask insurance if she needs a referral to see someone else in office first before she schedule appointment to be seen.

## 2022-02-15 NOTE — TELEPHONE ENCOUNTER
----- Message from Delano Torrez sent at 2/15/2022  1:47 PM CST -----  Contact: Patient  Patient requesting call back in regards to being seen asap. Patient stated that she in a lot of pain.      [phone] (home)

## 2022-02-15 NOTE — TELEPHONE ENCOUNTER
----- Message from Natalia Romero sent at 2/15/2022  3:51 PM CST -----  Contact: self [phone]  Pt requesting a call back in regards to an injection in neck.    Please call and advise

## 2022-02-16 ENCOUNTER — TELEPHONE (OUTPATIENT)
Dept: ORTHOPEDICS | Facility: CLINIC | Age: 77
End: 2022-02-16
Payer: MEDICARE

## 2022-02-16 DIAGNOSIS — M50.30 DDD (DEGENERATIVE DISC DISEASE), CERVICAL: Primary | ICD-10-CM

## 2022-02-21 ENCOUNTER — PATIENT MESSAGE (OUTPATIENT)
Dept: RESEARCH | Facility: HOSPITAL | Age: 77
End: 2022-02-21
Payer: MEDICARE

## 2022-02-24 NOTE — PROGRESS NOTES
DATE: 2/25/2022  PATIENT: Jasmin Alfaro    Supervising Physician: Deng Khanna M.D.    CHIEF COMPLAINT: neck pain    HISTORY:  Jasmin Alfaro is a 76 y.o. female here for evaluation of neck and right arm pain (Neck - 2, Arm - 2). The pain has been present since she had COVID in 2020. The patient describes the pain as throbbing. The pain is worse with lifting and mopping and improved by rest. There is associated numbness and tingling. There is subjective weakness. Prior treatments have included PT, aleve and gabapentin, but no ESIs or surgery. She saw Becka on 10/11/21, at that time she wanted to try PT prior to an VJ.     The patient denies myelopathic symptoms such as handwriting changes or difficulty with buttons/coins/keys. Denies perineal paresthesias, bowel/bladder dysfunction.    PAST MEDICAL/SURGICAL HISTORY:  Past Medical History:   Diagnosis Date    Allergic rhinitis     Anxiety     Colon polyps     COVID-19 11/25/2020    Hyperlipidemia     Hypertension     Multiple thyroid nodules      Past Surgical History:   Procedure Laterality Date    APPENDECTOMY      BIOPSY OF THYROID      HYSTERECTOMY      INCISIONAL HERNIA REPAIR      LAPAROTOMY         Medications:  Current Outpatient Medications on File Prior to Visit   Medication Sig Dispense Refill    amLODIPine (NORVASC) 10 MG tablet Take 1 tablet (10 mg total) by mouth once daily. 90 tablet 1    aspirin 81 MG Chew Take 81 mg by mouth once daily.       atenoloL (TENORMIN) 50 MG tablet Take 1 tablet (50 mg total) by mouth once daily. 90 tablet 1    biotin 1 mg Cap Take 1,000 mcg by mouth once daily.       diclofenac sodium (VOLTAREN) 1 % Gel Apply 2 g topically 4 (four) times daily. 1 Tube 3    EPINEPHrine (EPIPEN 2-YAA) 0.3 mg/0.3 mL AtIn Inject 0.3 mLs (0.3 mg total) into the muscle as needed. 1 Device 0    famotidine (PEPCID) 40 MG tablet Take 1 tablet (40 mg total) by mouth once daily. 90 tablet 0    fluticasone propionate (FLONASE)  50 mcg/actuation nasal spray USE 2 SPRAYS IN EACH NOSTRIL EVERY DAY 48 g 1    hydroCHLOROthiazide (HYDRODIURIL) 25 MG tablet Take 1 tablet (25 mg total) by mouth once daily. 90 tablet 1    loratadine (CLARITIN) 10 mg tablet Take 10 mg by mouth daily as needed.       multivit with minerals/lutein (MULTIVITAMIN 50 PLUS ORAL) Take by mouth once daily.       neomycin-polymyxin-dexamethasone (DEXACINE) 3.5 mg/g-10,000 unit/g-0.1 % Oint 1/2 INCH LEFT EYE TWICE A DAY FOR 10 14 DAYS      rosuvastatin (CRESTOR) 5 MG tablet Take 1 tablet (5 mg total) by mouth once daily. 90 tablet 1    tiZANidine (ZANAFLEX) 4 MG tablet Take 4 mg by mouth every 6 (six) hours as needed.      triamcinolone acetonide 0.1% (KENALOG) 0.1 % cream Apply topically 2 (two) times daily. 80 g 0    [DISCONTINUED] gabapentin (NEURONTIN) 100 MG capsule Take 1 capsule (100 mg total) by mouth 3 (three) times daily. 90 capsule 0     No current facility-administered medications on file prior to visit.       Social History:   Social History     Socioeconomic History    Marital status: Single   Tobacco Use    Smoking status: Never Smoker    Smokeless tobacco: Never Used   Substance and Sexual Activity    Alcohol use: Not Currently    Drug use: Never    Sexual activity: Not Currently       REVIEW OF SYSTEMS:  Constitution: Negative. Negative for chills, fever and night sweats.   Cardiovascular: Negative for chest pain and syncope.   Respiratory: Negative for cough and shortness of breath.   Gastrointestinal: See HPI. Negative for nausea/vomiting. Negative for abdominal pain.  Genitourinary: See HPI. Negative for discoloration or dysuria.  Skin: Negative for dry skin, itching and rash.   Hematologic/Lymphatic: Negative for bleeding problem. Does not bruise/bleed easily.   Musculoskeletal: Negative for falls and muscle weakness.   Neurological: See HPI. No seizures.   Endocrine: Negative for polydipsia, polyphagia and polyuria.   Allergic/Immunologic:  "Negative for hives and persistent infections.  Psychiatric/Behavioral: Negative for depression and insomnia.         EXAM:  Ht 5' 3" (1.6 m)   Wt 79.9 kg (176 lb 2.4 oz)   BMI 31.20 kg/m²     General: The patient is a very pleasant 76 y.o. female in no apparent distress, the patient is oriented to person, place and time.  Psych: Normal mood and affect  HEENT: Vision grossly intact, hearing intact to the spoken word.  Lungs: Respirations unlabored.  Gait: Normal station and gait, no difficulty with toe or heel walk.   Skin: Cervical skin negative for rashes, lesions, hairy patches and surgical scars.  Range of motion: Cervical range of motion is acceptable. There is mild tenderness to palpation.  Spinal Balance: Global saggital and coronal spinal balance acceptable, no significant for scoliosis and kyphosis.  Musculoskeletal: No pain with the range of motion of the bilateral shoulders and elbows. Normal bulk and contour of the bilateral hands.  Vascular: Bilateral hands warm and well perfused, radial pulses 2+ bilaterally.  Neurological: Normal strength and tone in all major motor groups in the bilateral upper and lower extremities. Normal sensation to light touch in the C5-T1 and L2-S1 dermatomes bilaterally.  Deep tendon reflexes symmetric 2+ in the bilateral upper and lower extremities.  Negative Inverted Radial Reflex and Tinajero's bilaterally. Negative Babinski bilaterally.     IMAGING:   Today I personally reviewed AP, Lat and Flex/Ex  upright C-spine films that demonstrate mild degenerative changes.     MRI cervical spine demonstrates moderate right foraminal narrowing at C3/4.  No significant spinal stenosis.    Body mass index is 31.2 kg/m².    Hemoglobin A1C   Date Value Ref Range Status   07/29/2021 6.1 (H) 4.0 - 5.6 % Final     Comment:     ADA Screening Guidelines:  5.7-6.4%  Consistent with prediabetes  >or=6.5%  Consistent with diabetes    High levels of fetal hemoglobin interfere with the " HbA1C  assay. Heterozygous hemoglobin variants (HbS, HgC, etc)do  not significantly interfere with this assay.   However, presence of multiple variants may affect accuracy.     10/01/2020 6.2 (H) 4.0 - 5.6 % Final     Comment:     ADA Screening Guidelines:  5.7-6.4%  Consistent with prediabetes  >or=6.5%  Consistent with diabetes  High levels of fetal hemoglobin interfere with the HbA1C  assay. Heterozygous hemoglobin variants (HbS, HgC, etc)do  not significantly interfere with this assay.   However, presence of multiple variants may affect accuracy.     05/01/2007 6.0 4.5 - 6.2 % Final           ASSESSMENT/PLAN:    Jasmin was seen today for establish care and pain.    Diagnoses and all orders for this visit:    Cervical radiculopathy  -     Ambulatory referral/consult to Orthopedics  -     gabapentin (NEURONTIN) 100 MG capsule; Take 1 capsule (100 mg total) by mouth 3 (three) times daily.  -     Procedure Order to Pain Management; Future        Today we discussed at length all of the different treatment options including anti-inflammatories, acetaminophen, rest, ice, heat, physical therapy including strengthening and stretching exercises, home exercises, ROM, aerobic conditioning, aqua therapy, other modalities including ultrasound, massage, and dry needling, epidural steroid injections and finally surgical intervention.      Cervical VJ ordered with pain management. The patient will follow up 2 weeks after the injection if her pain persists.

## 2022-02-25 ENCOUNTER — HOSPITAL ENCOUNTER (OUTPATIENT)
Dept: RADIOLOGY | Facility: HOSPITAL | Age: 77
Discharge: HOME | End: 2022-02-25
Attending: REGISTERED NURSE
Payer: MEDICARE

## 2022-02-25 ENCOUNTER — TELEPHONE (OUTPATIENT)
Dept: ADMINISTRATIVE | Facility: OTHER | Age: 77
End: 2022-02-25
Payer: MEDICARE

## 2022-02-25 ENCOUNTER — OFFICE VISIT (OUTPATIENT)
Dept: ORTHOPEDICS | Facility: CLINIC | Age: 77
End: 2022-02-25
Payer: MEDICARE

## 2022-02-25 VITALS — WEIGHT: 176.13 LBS | HEIGHT: 63 IN | BODY MASS INDEX: 31.21 KG/M2

## 2022-02-25 DIAGNOSIS — M54.12 CERVICAL RADICULOPATHY: ICD-10-CM

## 2022-02-25 DIAGNOSIS — M50.30 DDD (DEGENERATIVE DISC DISEASE), CERVICAL: ICD-10-CM

## 2022-02-25 PROCEDURE — 99214 PR OFFICE/OUTPT VISIT, EST, LEVL IV, 30-39 MIN: ICD-10-PCS | Mod: S$GLB,,, | Performed by: REGISTERED NURSE

## 2022-02-25 PROCEDURE — 1159F PR MEDICATION LIST DOCUMENTED IN MEDICAL RECORD: ICD-10-PCS | Mod: CPTII,S$GLB,, | Performed by: REGISTERED NURSE

## 2022-02-25 PROCEDURE — 72050 X-RAY EXAM NECK SPINE 4/5VWS: CPT | Mod: 26,HCNC,, | Performed by: RADIOLOGY

## 2022-02-25 PROCEDURE — 72050 X-RAY EXAM NECK SPINE 4/5VWS: CPT | Mod: TC,HCNC

## 2022-02-25 PROCEDURE — 99214 OFFICE O/P EST MOD 30 MIN: CPT | Mod: S$GLB,,, | Performed by: REGISTERED NURSE

## 2022-02-25 PROCEDURE — 1125F PR PAIN SEVERITY QUANTIFIED, PAIN PRESENT: ICD-10-PCS | Mod: CPTII,S$GLB,, | Performed by: REGISTERED NURSE

## 2022-02-25 PROCEDURE — 1125F AMNT PAIN NOTED PAIN PRSNT: CPT | Mod: CPTII,S$GLB,, | Performed by: REGISTERED NURSE

## 2022-02-25 PROCEDURE — 1101F PR PT FALLS ASSESS DOC 0-1 FALLS W/OUT INJ PAST YR: ICD-10-PCS | Mod: CPTII,S$GLB,, | Performed by: REGISTERED NURSE

## 2022-02-25 PROCEDURE — 1159F MED LIST DOCD IN RCRD: CPT | Mod: CPTII,S$GLB,, | Performed by: REGISTERED NURSE

## 2022-02-25 PROCEDURE — 99999 PR PBB SHADOW E&M-EST. PATIENT-LVL III: ICD-10-PCS | Mod: PBBFAC,HCNC,, | Performed by: REGISTERED NURSE

## 2022-02-25 PROCEDURE — 3288F FALL RISK ASSESSMENT DOCD: CPT | Mod: CPTII,S$GLB,, | Performed by: REGISTERED NURSE

## 2022-02-25 PROCEDURE — 72050 XR CERVICAL SPINE AP LAT WITH FLEX EXTEN: ICD-10-PCS | Mod: 26,HCNC,, | Performed by: RADIOLOGY

## 2022-02-25 PROCEDURE — 99999 PR PBB SHADOW E&M-EST. PATIENT-LVL III: CPT | Mod: PBBFAC,HCNC,, | Performed by: REGISTERED NURSE

## 2022-02-25 PROCEDURE — 3288F PR FALLS RISK ASSESSMENT DOCUMENTED: ICD-10-PCS | Mod: CPTII,S$GLB,, | Performed by: REGISTERED NURSE

## 2022-02-25 PROCEDURE — 1101F PT FALLS ASSESS-DOCD LE1/YR: CPT | Mod: CPTII,S$GLB,, | Performed by: REGISTERED NURSE

## 2022-02-25 RX ORDER — GABAPENTIN 100 MG/1
100 CAPSULE ORAL 3 TIMES DAILY
Qty: 90 CAPSULE | Refills: 11 | Status: SHIPPED | OUTPATIENT
Start: 2022-02-25 | End: 2022-03-02

## 2022-03-03 ENCOUNTER — TELEPHONE (OUTPATIENT)
Dept: PAIN MEDICINE | Facility: CLINIC | Age: 77
End: 2022-03-03
Payer: MEDICARE

## 2022-03-03 NOTE — TELEPHONE ENCOUNTER
"----- Message from Esther Garza sent at 3/3/2022  7:52 AM CST -----  Regarding: PAtient Advice          Name of Who is Calling:  Jasmin Alfaro    Who Left The Message:  Jasmin Alafro      What is the request in detail:    Patient called requesting a call. Patient states, "she would like to cancel her upcoming procedure with Migue Ivan MD on Friday 03/04/2022.   Please further advise.    Thank you!      Reply by [facility]: No      Preferred Call Back :  [phone] (L)                                          "

## 2022-04-26 ENCOUNTER — PATIENT MESSAGE (OUTPATIENT)
Dept: PAIN MEDICINE | Facility: OTHER | Age: 77
End: 2022-04-26
Payer: MEDICARE

## 2022-04-26 ENCOUNTER — TELEPHONE (OUTPATIENT)
Dept: PAIN MEDICINE | Facility: OTHER | Age: 77
End: 2022-04-26
Payer: MEDICARE

## 2022-05-10 ENCOUNTER — TELEPHONE (OUTPATIENT)
Dept: PRIMARY CARE CLINIC | Facility: CLINIC | Age: 77
End: 2022-05-10
Payer: MEDICARE

## 2022-05-10 DIAGNOSIS — R06.83 SNORING: Primary | ICD-10-CM

## 2022-05-10 DIAGNOSIS — H26.9 CATARACT, UNSPECIFIED CATARACT TYPE, UNSPECIFIED LATERALITY: ICD-10-CM

## 2022-05-10 NOTE — TELEPHONE ENCOUNTER
Lov 1/13/22  [facility] a [facility] The physican she was seeing is no longer on her insurance. All of her doctors need to be [facility].  She states that she also snores and would like a referral to the sleep clinic

## 2022-05-10 NOTE — TELEPHONE ENCOUNTER
----- Message from Angelina Luis sent at 5/10/2022  3:27 PM CDT -----  Contact: [phone]  Patient would like to get a referral.  Referral to what specialty:  Ophthalmology  Does the patient want the referral with a specific physician:    [facility] the [facility]  Reason (be specific):  cataract removed  Does the patient already have the specialty clinic appointment scheduled:  no  If yes, what date is the appointment scheduled:     Is the insurance listed in Epic correct? (this is important for a referral):  yes  Advised patient that once provider approves this either a nurse or  will return their call?:   Would the patient like a call back, or a response through their [facility] portal?:   call back      Patient would like to get a referral.  Referral to what specialty:  Sleep Apnea  Does the patient want the referral with a specific physician:  no  [facility]  Reason (be specific): snoring   Does the patient already have the specialty clinic appointment scheduled:  no  If yes, what date is the appointment scheduled:     Is the insurance listed in Epic correct? (this is important for a referral):  yes  Advised patient that once provider approves this either a nurse or  will return their call?:   Would the patient like a call back, or a response through their [facility] portal?: call back  Comments:

## 2022-05-11 ENCOUNTER — TELEPHONE (OUTPATIENT)
Dept: OPHTHALMOLOGY | Facility: CLINIC | Age: 77
End: 2022-05-11
Payer: MEDICARE

## 2022-05-11 NOTE — TELEPHONE ENCOUNTER
----- Message from Gwendolyn Tafoya sent at 5/11/2022  3:35 PM CDT -----    ----- Message -----  From: Lidya Smith  Sent: 5/11/2022   3:06 PM CDT  To: [facility] Clinical Support Staff    Dr. Gloria Smith has put in a referral for Consult to Ophthalmology. Please assist in scheduling.    Cataract, unspecified cataract type, unspecified laterality [H26.9]    Thanks

## 2022-05-11 NOTE — TELEPHONE ENCOUNTER
----- Message from Lidya Smith sent at 5/11/2022  3:05 PM CDT -----  Dr. Gloria Smith has put in a referral for Consult to Ophthalmology. Please assist in scheduling.    Cataract, unspecified cataract type, unspecified laterality [H26.9]    Thanks

## 2022-05-12 ENCOUNTER — OFFICE VISIT (OUTPATIENT)
Dept: URGENT CARE | Facility: CLINIC | Age: 77
End: 2022-05-12
Payer: MEDICARE

## 2022-05-12 VITALS
OXYGEN SATURATION: 100 % | HEIGHT: 63 IN | TEMPERATURE: 98 F | BODY MASS INDEX: 31.18 KG/M2 | WEIGHT: 176 LBS | SYSTOLIC BLOOD PRESSURE: 149 MMHG | HEART RATE: 63 BPM | RESPIRATION RATE: 18 BRPM | DIASTOLIC BLOOD PRESSURE: 65 MMHG

## 2022-05-12 DIAGNOSIS — N39.0 URINARY TRACT INFECTION WITHOUT HEMATURIA, SITE UNSPECIFIED: Primary | ICD-10-CM

## 2022-05-12 LAB
BILIRUB UR QL STRIP: NEGATIVE
GLUCOSE UR QL STRIP: NEGATIVE
KETONES UR QL STRIP: NEGATIVE
LEUKOCYTE ESTERASE UR QL STRIP: NEGATIVE
PH, POC UA: 6 (ref 5–8)
POC BLOOD, URINE: NEGATIVE
POC NITRATES, URINE: NEGATIVE
PROT UR QL STRIP: NEGATIVE
SP GR UR STRIP: 1.01 (ref 1–1.03)
UROBILINOGEN UR STRIP-ACNC: NORMAL (ref 0.1–1.1)

## 2022-05-12 PROCEDURE — 1159F PR MEDICATION LIST DOCUMENTED IN MEDICAL RECORD: ICD-10-PCS | Mod: CPTII,S$GLB,, | Performed by: NURSE PRACTITIONER

## 2022-05-12 PROCEDURE — 81003 URINALYSIS AUTO W/O SCOPE: CPT | Mod: QW,S$GLB,, | Performed by: NURSE PRACTITIONER

## 2022-05-12 PROCEDURE — 1160F PR REVIEW ALL MEDS BY PRESCRIBER/CLIN PHARMACIST DOCUMENTED: ICD-10-PCS | Mod: CPTII,S$GLB,, | Performed by: NURSE PRACTITIONER

## 2022-05-12 PROCEDURE — 1126F PR PAIN SEVERITY QUANTIFIED, NO PAIN PRESENT: ICD-10-PCS | Mod: CPTII,S$GLB,, | Performed by: NURSE PRACTITIONER

## 2022-05-12 PROCEDURE — 3078F DIAST BP <80 MM HG: CPT | Mod: CPTII,S$GLB,, | Performed by: NURSE PRACTITIONER

## 2022-05-12 PROCEDURE — 1159F MED LIST DOCD IN RCRD: CPT | Mod: CPTII,S$GLB,, | Performed by: NURSE PRACTITIONER

## 2022-05-12 PROCEDURE — 1160F RVW MEDS BY RX/DR IN RCRD: CPT | Mod: CPTII,S$GLB,, | Performed by: NURSE PRACTITIONER

## 2022-05-12 PROCEDURE — 3077F PR MOST RECENT SYSTOLIC BLOOD PRESSURE >= 140 MM HG: ICD-10-PCS | Mod: CPTII,S$GLB,, | Performed by: NURSE PRACTITIONER

## 2022-05-12 PROCEDURE — 1126F AMNT PAIN NOTED NONE PRSNT: CPT | Mod: CPTII,S$GLB,, | Performed by: NURSE PRACTITIONER

## 2022-05-12 PROCEDURE — 3078F PR MOST RECENT DIASTOLIC BLOOD PRESSURE < 80 MM HG: ICD-10-PCS | Mod: CPTII,S$GLB,, | Performed by: NURSE PRACTITIONER

## 2022-05-12 PROCEDURE — 99214 OFFICE O/P EST MOD 30 MIN: CPT | Mod: S$GLB,,, | Performed by: NURSE PRACTITIONER

## 2022-05-12 PROCEDURE — 99214 PR OFFICE/OUTPT VISIT, EST, LEVL IV, 30-39 MIN: ICD-10-PCS | Mod: S$GLB,,, | Performed by: NURSE PRACTITIONER

## 2022-05-12 PROCEDURE — 81003 POCT URINALYSIS, DIPSTICK, AUTOMATED, W/O SCOPE: ICD-10-PCS | Mod: QW,S$GLB,, | Performed by: NURSE PRACTITIONER

## 2022-05-12 PROCEDURE — 3077F SYST BP >= 140 MM HG: CPT | Mod: CPTII,S$GLB,, | Performed by: NURSE PRACTITIONER

## 2022-05-12 RX ORDER — AMOXICILLIN AND CLAVULANATE POTASSIUM 500; 125 MG/1; MG/1
1 TABLET, FILM COATED ORAL 2 TIMES DAILY
Qty: 10 TABLET | Refills: 0 | Status: SHIPPED | OUTPATIENT
Start: 2022-05-12 | End: 2022-05-17

## 2022-05-12 NOTE — PROGRESS NOTES
"Subjective:       Patient ID: Jasmin Alfaro is a 76 y.o. female.    Vitals:  height is 5' 3" (1.6 m) and weight is 79.8 kg (176 lb). Her temperature is 97.6 °F (36.4 °C). Her blood pressure is 149/65 (abnormal) and her pulse is 63. Her respiration is 18 and oxygen saturation is 100%.     Chief Complaint: Back Pain    Patient reports lower back pain, lower abdominal pain and urinary frequency that started 2 weeks ago. No fever, no chills.  No n/v.      Back Pain  This is a new problem. The current episode started 1 to 4 weeks ago. The quality of the pain is described as aching. The pain is at a severity of 2/10. The pain is mild. Associated symptoms include abdominal pain (lower bilateral) and dysuria. Pertinent negatives include no fever.       Constitution: Negative for chills, sweating, fatigue, fever and generalized weakness.   Gastrointestinal: Positive for abdominal pain (lower bilateral) and constipation. Negative for nausea and vomiting.   Genitourinary: Positive for dysuria and frequency. Negative for flank pain.   Musculoskeletal: Positive for back pain. Negative for trauma.       Objective:      Physical Exam   Constitutional:  Non-toxic appearance. She does not appear ill. No distress.   Pulmonary/Chest: Effort normal.   Abdominal: Soft. There is no abdominal tenderness.   Musculoskeletal:      Lumbar back: She exhibits tenderness.        Back:    Neurological: She is alert.   Skin: Skin is not diaphoretic.   Nursing note and vitals reviewed.        Results for orders placed or performed in visit on 05/12/22   POCT Urinalysis, Dipstick, Automated, W/O Scope   Result Value Ref Range    POC Blood, Urine Negative Negative    POC Bilirubin, Urine Negative Negative    POC Urobilinogen, Urine Normal 0.1 - 1.1    POC Ketones, Urine Negative Negative    POC Protein, Urine Negative Negative    POC Nitrates, Urine Negative Negative    POC Glucose, Urine Negative Negative    pH, UA 6.0 5 - 8    POC Specific Gravity, " Urine 1.015 1.003 - 1.029    POC Leukocytes, Urine Negative Negative       Assessment:       1. Urinary tract infection without hematuria, site unspecified          Plan:         Urinary tract infection without hematuria, site unspecified  -     POCT Urinalysis, Dipstick, Automated, W/O Scope  -     Culture, Urine  -     amoxicillin-clavulanate 500-125mg (AUGMENTIN) 500-125 mg Tab; Take 1 tablet (500 mg total) by mouth 2 (two) times daily. With food for 5 days  Dispense: 10 tablet; Refill: 0

## 2022-05-12 NOTE — PATIENT INSTRUCTIONS
Return to Urgent Care or go to ER if symptoms worsen or fail to improve.  Follow up with PCP as recommended for further management.   We will notify you of any test results (if performed) in 3-5 days.

## 2022-05-16 ENCOUNTER — PATIENT OUTREACH (OUTPATIENT)
Dept: ADMINISTRATIVE | Facility: OTHER | Age: 77
End: 2022-05-16
Payer: MEDICARE

## 2022-05-27 ENCOUNTER — OFFICE VISIT (OUTPATIENT)
Dept: SLEEP MEDICINE | Facility: CLINIC | Age: 77
End: 2022-05-27
Payer: MEDICARE

## 2022-05-27 VITALS — WEIGHT: 176 LBS | HEIGHT: 63 IN | BODY MASS INDEX: 31.18 KG/M2

## 2022-05-27 DIAGNOSIS — I10 HYPERTENSION, UNSPECIFIED TYPE: Primary | ICD-10-CM

## 2022-05-27 DIAGNOSIS — R06.83 SNORING: ICD-10-CM

## 2022-05-27 DIAGNOSIS — R35.1 NOCTURIA: ICD-10-CM

## 2022-05-27 DIAGNOSIS — F51.09 OTHER INSOMNIA NOT DUE TO A SUBSTANCE OR KNOWN PHYSIOLOGICAL CONDITION: ICD-10-CM

## 2022-05-27 PROCEDURE — 1159F PR MEDICATION LIST DOCUMENTED IN MEDICAL RECORD: ICD-10-PCS | Mod: CPTII,S$GLB,, | Performed by: INTERNAL MEDICINE

## 2022-05-27 PROCEDURE — 3288F FALL RISK ASSESSMENT DOCD: CPT | Mod: CPTII,S$GLB,, | Performed by: INTERNAL MEDICINE

## 2022-05-27 PROCEDURE — 1126F PR PAIN SEVERITY QUANTIFIED, NO PAIN PRESENT: ICD-10-PCS | Mod: CPTII,S$GLB,, | Performed by: INTERNAL MEDICINE

## 2022-05-27 PROCEDURE — 99999 PR PBB SHADOW E&M-EST. PATIENT-LVL III: CPT | Mod: PBBFAC,,, | Performed by: INTERNAL MEDICINE

## 2022-05-27 PROCEDURE — 1101F PR PT FALLS ASSESS DOC 0-1 FALLS W/OUT INJ PAST YR: ICD-10-PCS | Mod: CPTII,S$GLB,, | Performed by: INTERNAL MEDICINE

## 2022-05-27 PROCEDURE — 99204 PR OFFICE/OUTPT VISIT, NEW, LEVL IV, 45-59 MIN: ICD-10-PCS | Mod: S$GLB,,, | Performed by: INTERNAL MEDICINE

## 2022-05-27 PROCEDURE — 99204 OFFICE O/P NEW MOD 45 MIN: CPT | Mod: S$GLB,,, | Performed by: INTERNAL MEDICINE

## 2022-05-27 PROCEDURE — 99999 PR PBB SHADOW E&M-EST. PATIENT-LVL III: ICD-10-PCS | Mod: PBBFAC,,, | Performed by: INTERNAL MEDICINE

## 2022-05-27 PROCEDURE — 1126F AMNT PAIN NOTED NONE PRSNT: CPT | Mod: CPTII,S$GLB,, | Performed by: INTERNAL MEDICINE

## 2022-05-27 PROCEDURE — 1101F PT FALLS ASSESS-DOCD LE1/YR: CPT | Mod: CPTII,S$GLB,, | Performed by: INTERNAL MEDICINE

## 2022-05-27 PROCEDURE — 1159F MED LIST DOCD IN RCRD: CPT | Mod: CPTII,S$GLB,, | Performed by: INTERNAL MEDICINE

## 2022-05-27 PROCEDURE — 3288F PR FALLS RISK ASSESSMENT DOCUMENTED: ICD-10-PCS | Mod: CPTII,S$GLB,, | Performed by: INTERNAL MEDICINE

## 2022-05-27 NOTE — PROGRESS NOTES
Referred by Gloria Smith MD     NEW PATIENT VISIT    Jasmin Alfaro  is a pleasant 76 y.o. female  with PMH significant for HTN, pre DM who presents today for evaluation of snoring. .    SLEEP SCHEDULE   Bed Time 10:30-11P   Sleep Latency Not long   Arousals Frequently, often up at 2/ 3AM   Nocturia 2-3   Back to sleep Can take awhile   Wake time 8:30A   Naps none   Work Works at a [employer], [employer]       Past Medical History:   Diagnosis Date    Allergic rhinitis     Anxiety     Colon polyps     COVID-19 11/25/2020    Hyperlipidemia     Hypertension     Multiple thyroid nodules      Patient Active Problem List   Diagnosis    Hyperlipidemia    Anxiety    Hypertension    Multiple thyroid nodules    Dermatitis    Need for vaccination with 13-polyvalent pneumococcal conjugate vaccine    Preop examination    Prediabetes    Osteopenia    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 , Disp: , Rfl:     atenoloL (TENORMIN) 50 MG tablet, Take 1 tablet (50 mg total) by mouth once daily., Disp: 90 tablet, Rfl: 1    biotin 1 mg Cap, Take 1,000 mcg by mouth once daily. , Disp: , Rfl:     diclofenac sodium (VOLTAREN) 1 % Gel, Apply 2 g topically 4 (four) times daily., Disp: 1 Tube, Rfl: 3    EPINEPHrine (EPIPEN 2-YAA) 0.3 mg/0.3 mL AtIn, Inject 0.3 mLs (0.3 mg total) into the muscle as needed., Disp: 1 Device, Rfl: 0    famotidine (PEPCID) 40 MG tablet, Take 1 tablet (40 mg total) by mouth once daily., Disp: 90 tablet, Rfl: 0    fluticasone propionate (FLONASE) 50 mcg/actuation nasal spray, USE 2 SPRAYS IN EACH NOSTRIL EVERY DAY, Disp: 48 g, Rfl: 1     "gabapentin (NEURONTIN) 100 MG capsule, Take 1 capsule (100 mg total) by mouth 3 (three) times daily., Disp: 90 capsule, Rfl: 2    hydroCHLOROthiazide (HYDRODIURIL) 25 MG tablet, Take 1 tablet (25 mg total) by mouth once daily., Disp: 90 tablet, Rfl: 1    loratadine (CLARITIN) 10 mg tablet, Take 10 mg by mouth daily as needed. , Disp: , Rfl:     multivit with minerals/lutein (MULTIVITAMIN 50 PLUS ORAL), Take by mouth once daily. , Disp: , Rfl:     neomycin-polymyxin-dexamethasone (DEXACINE) 3.5 mg/g-10,000 unit/g-0.1 % Oint, 1/2 INCH LEFT EYE TWICE A DAY FOR 10 14 DAYS, Disp: , Rfl:     rosuvastatin (CRESTOR) 5 MG tablet, Take 1 tablet (5 mg total) by mouth once daily., Disp: 90 tablet, Rfl: 1    tiZANidine (ZANAFLEX) 4 MG tablet, Take 4 mg by mouth every 6 (six) hours as needed., Disp: , Rfl:     triamcinolone acetonide 0.1% (KENALOG) 0.1 % cream, Apply topically 2 (two) times daily., Disp: 80 g, Rfl: 0     Vitals:    05/27/22 0926   Weight: 79.8 kg (176 lb)   Height: 5' 3" (1.6 m)     Physical Exam:    GEN:   Well-appearing  Psych:  Appropriate affect, demonstrates insight  SKIN:  No rash on the face or bridge of the nose  HEENT: MP1, + oropharynx shallow in A-P dimension    LABS:   Lab Results   Component Value Date    CO2 26 01/21/2022       RECORDS REVIEWED PREVIOUSLY:    No prior sleep testing.    ASSESSMENT    No flowsheet data found.  PROBLEM DESCRIPTION/ Sx on Presentation  STATUS   possible NIK   + loud snoring, + snoring arousals, no gasping arousals no recent bed partner    New   Daytime Sx   occasional sleepiness when inactive in the afternoon  denies sleepiness when driving   ESS 5/24 on intake  New   Insomnia   Trouble falling asleep, does word puzzles, reading  Prior hypnotics:        Current hypnotics:     New   Nocturia   x 1-3 per sleep period  New   Other issues:     PLAN     -recommend sleep testing   -discussed trial therapy if NIK present and the patient is  open to a trial of CPAP " therapy  -driving precautions were discussed with the patient    RTC          The patient was given open opportunity to ask questions and/or express concerns about treatment plan.   All questions/concerns were discussed.     Two patient identifiers used prior to evaluation.

## 2022-05-30 ENCOUNTER — TELEPHONE (OUTPATIENT)
Dept: SLEEP MEDICINE | Facility: OTHER | Age: 77
End: 2022-05-30
Payer: MEDICARE

## 2022-05-31 ENCOUNTER — TELEPHONE (OUTPATIENT)
Dept: SLEEP MEDICINE | Facility: OTHER | Age: 77
End: 2022-05-31
Payer: MEDICARE

## 2022-06-03 ENCOUNTER — TELEPHONE (OUTPATIENT)
Dept: SLEEP MEDICINE | Facility: OTHER | Age: 77
End: 2022-06-03
Payer: MEDICARE

## 2022-06-03 ENCOUNTER — HOSPITAL ENCOUNTER (OUTPATIENT)
Dept: SLEEP MEDICINE | Facility: OTHER | Age: 77
Discharge: HOME | End: 2022-06-03
Attending: INTERNAL MEDICINE
Payer: MEDICARE

## 2022-06-03 DIAGNOSIS — I10 HYPERTENSION, UNSPECIFIED TYPE: ICD-10-CM

## 2022-06-03 DIAGNOSIS — F51.09 OTHER INSOMNIA NOT DUE TO A SUBSTANCE OR KNOWN PHYSIOLOGICAL CONDITION: ICD-10-CM

## 2022-06-03 DIAGNOSIS — R06.83 SNORING: ICD-10-CM

## 2022-06-03 DIAGNOSIS — R35.1 NOCTURIA: ICD-10-CM

## 2022-06-03 PROCEDURE — 95806 SLEEP STUDY UNATT&RESP EFFT: CPT | Mod: 26,,, | Performed by: INTERNAL MEDICINE

## 2022-06-03 PROCEDURE — 95800 SLP STDY UNATTENDED: CPT

## 2022-06-03 PROCEDURE — 95806 PR SLEEP STUDY, UNATTENDED, SIMUL RECORD HR/O2 SAT/RESP FLOW/RESP EFFT: ICD-10-PCS | Mod: 26,,, | Performed by: INTERNAL MEDICINE

## 2022-06-07 ENCOUNTER — PATIENT MESSAGE (OUTPATIENT)
Dept: SLEEP MEDICINE | Facility: CLINIC | Age: 77
End: 2022-06-07
Payer: MEDICARE

## 2022-06-07 DIAGNOSIS — G47.33 OSA (OBSTRUCTIVE SLEEP APNEA): Primary | ICD-10-CM

## 2022-06-13 RX ORDER — FLUTICASONE PROPIONATE 50 MCG
2 SPRAY, SUSPENSION (ML) NASAL DAILY
Qty: 48 G | Refills: 3 | Status: SHIPPED | OUTPATIENT
Start: 2022-06-13 | End: 2023-10-09 | Stop reason: SDUPTHER

## 2022-06-13 NOTE — TELEPHONE ENCOUNTER
Care Due:                  Date            Visit Type   Department     Provider  --------------------------------------------------------------------------------                                EP -                              PRIMARY      [facility] PR[facility]  Last Visit: 01-      CARE ([facility])   CARE           Gloria Smith                              EP -                              PRIMARY      [facility] PRIMARY  Next Visit: 07-      CARE ([facility])   CARE           Gloria Costarone                                                            Last  Test          Frequency    Reason                     Performed    Due Date  --------------------------------------------------------------------------------    CMP.........  12 months..  rosuvastatin.............  07- 07-    Lipid Panel.  12 months..  rosuvastatin.............  07- 07-    [employer] Embedded Care Gaps. Reference number: [id number]. 6/13/2022   8:37:21 AM CDT

## 2022-06-21 ENCOUNTER — PATIENT MESSAGE (OUTPATIENT)
Dept: SLEEP MEDICINE | Facility: CLINIC | Age: 77
End: 2022-06-21
Payer: MEDICARE

## 2022-06-27 ENCOUNTER — HOSPITAL ENCOUNTER (OUTPATIENT)
Dept: RADIOLOGY | Facility: OTHER | Age: 77
Discharge: HOME | End: 2022-06-27
Attending: INTERNAL MEDICINE
Payer: MEDICARE

## 2022-06-27 DIAGNOSIS — Z12.31 SCREENING MAMMOGRAM, ENCOUNTER FOR: ICD-10-CM

## 2022-06-27 PROCEDURE — 77067 SCR MAMMO BI INCL CAD: CPT | Mod: 26,,, | Performed by: RADIOLOGY

## 2022-06-27 PROCEDURE — 77063 BREAST TOMOSYNTHESIS BI: CPT | Mod: 26,,, | Performed by: RADIOLOGY

## 2022-06-27 PROCEDURE — 77063 BREAST TOMOSYNTHESIS BI: CPT | Mod: TC

## 2022-06-27 PROCEDURE — 77063 MAMMO DIGITAL SCREENING BILAT WITH TOMO: ICD-10-PCS | Mod: 26,,, | Performed by: RADIOLOGY

## 2022-06-27 PROCEDURE — 77067 MAMMO DIGITAL SCREENING BILAT WITH TOMO: ICD-10-PCS | Mod: 26,,, | Performed by: RADIOLOGY

## 2022-07-14 ENCOUNTER — OFFICE VISIT (OUTPATIENT)
Dept: PRIMARY CARE CLINIC | Facility: CLINIC | Age: 77
End: 2022-07-14
Payer: MEDICARE

## 2022-07-14 VITALS
HEIGHT: 63 IN | WEIGHT: 178.13 LBS | DIASTOLIC BLOOD PRESSURE: 60 MMHG | OXYGEN SATURATION: 99 % | TEMPERATURE: 98 F | SYSTOLIC BLOOD PRESSURE: 124 MMHG | BODY MASS INDEX: 31.56 KG/M2 | HEART RATE: 66 BPM | RESPIRATION RATE: 18 BRPM

## 2022-07-14 DIAGNOSIS — R25.2 LEG CRAMP: ICD-10-CM

## 2022-07-14 DIAGNOSIS — E04.2 MULTIPLE THYROID NODULES: ICD-10-CM

## 2022-07-14 DIAGNOSIS — R73.03 PREDIABETES: ICD-10-CM

## 2022-07-14 DIAGNOSIS — I10 HYPERTENSION, UNSPECIFIED TYPE: Primary | ICD-10-CM

## 2022-07-14 DIAGNOSIS — K21.9 GASTROESOPHAGEAL REFLUX DISEASE, UNSPECIFIED WHETHER ESOPHAGITIS PRESENT: ICD-10-CM

## 2022-07-14 DIAGNOSIS — G47.33 OSA (OBSTRUCTIVE SLEEP APNEA): ICD-10-CM

## 2022-07-14 DIAGNOSIS — M85.80 OSTEOPENIA, UNSPECIFIED LOCATION: ICD-10-CM

## 2022-07-14 DIAGNOSIS — E78.5 HYPERLIPIDEMIA, UNSPECIFIED HYPERLIPIDEMIA TYPE: ICD-10-CM

## 2022-07-14 PROCEDURE — 1126F PR PAIN SEVERITY QUANTIFIED, NO PAIN PRESENT: ICD-10-PCS | Mod: CPTII,S$GLB,, | Performed by: INTERNAL MEDICINE

## 2022-07-14 PROCEDURE — 3078F DIAST BP <80 MM HG: CPT | Mod: CPTII,S$GLB,, | Performed by: INTERNAL MEDICINE

## 2022-07-14 PROCEDURE — 99999 PR PBB SHADOW E&M-EST. PATIENT-LVL IV: ICD-10-PCS | Mod: PBBFAC,,, | Performed by: INTERNAL MEDICINE

## 2022-07-14 PROCEDURE — 3074F SYST BP LT 130 MM HG: CPT | Mod: CPTII,S$GLB,, | Performed by: INTERNAL MEDICINE

## 2022-07-14 PROCEDURE — 1159F MED LIST DOCD IN RCRD: CPT | Mod: CPTII,S$GLB,, | Performed by: INTERNAL MEDICINE

## 2022-07-14 PROCEDURE — 99214 PR OFFICE/OUTPT VISIT, EST, LEVL IV, 30-39 MIN: ICD-10-PCS | Mod: S$GLB,,, | Performed by: INTERNAL MEDICINE

## 2022-07-14 PROCEDURE — 1159F PR MEDICATION LIST DOCUMENTED IN MEDICAL RECORD: ICD-10-PCS | Mod: CPTII,S$GLB,, | Performed by: INTERNAL MEDICINE

## 2022-07-14 PROCEDURE — 99214 OFFICE O/P EST MOD 30 MIN: CPT | Mod: S$GLB,,, | Performed by: INTERNAL MEDICINE

## 2022-07-14 PROCEDURE — 1126F AMNT PAIN NOTED NONE PRSNT: CPT | Mod: CPTII,S$GLB,, | Performed by: INTERNAL MEDICINE

## 2022-07-14 PROCEDURE — 1101F PR PT FALLS ASSESS DOC 0-1 FALLS W/OUT INJ PAST YR: ICD-10-PCS | Mod: CPTII,S$GLB,, | Performed by: INTERNAL MEDICINE

## 2022-07-14 PROCEDURE — 3074F PR MOST RECENT SYSTOLIC BLOOD PRESSURE < 130 MM HG: ICD-10-PCS | Mod: CPTII,S$GLB,, | Performed by: INTERNAL MEDICINE

## 2022-07-14 PROCEDURE — 99999 PR PBB SHADOW E&M-EST. PATIENT-LVL IV: CPT | Mod: PBBFAC,,, | Performed by: INTERNAL MEDICINE

## 2022-07-14 PROCEDURE — 3288F FALL RISK ASSESSMENT DOCD: CPT | Mod: CPTII,S$GLB,, | Performed by: INTERNAL MEDICINE

## 2022-07-14 PROCEDURE — 1101F PT FALLS ASSESS-DOCD LE1/YR: CPT | Mod: CPTII,S$GLB,, | Performed by: INTERNAL MEDICINE

## 2022-07-14 PROCEDURE — 3078F PR MOST RECENT DIASTOLIC BLOOD PRESSURE < 80 MM HG: ICD-10-PCS | Mod: CPTII,S$GLB,, | Performed by: INTERNAL MEDICINE

## 2022-07-14 PROCEDURE — 3288F PR FALLS RISK ASSESSMENT DOCUMENTED: ICD-10-PCS | Mod: CPTII,S$GLB,, | Performed by: INTERNAL MEDICINE

## 2022-07-14 RX ORDER — AMLODIPINE BESYLATE 10 MG/1
10 TABLET ORAL DAILY
Qty: 90 TABLET | Refills: 1 | Status: SHIPPED | OUTPATIENT
Start: 2022-07-14 | End: 2023-01-12 | Stop reason: SDUPTHER

## 2022-07-14 RX ORDER — ATENOLOL 50 MG/1
50 TABLET ORAL DAILY
Qty: 90 TABLET | Refills: 1 | Status: SHIPPED | OUTPATIENT
Start: 2022-07-14 | End: 2023-01-12 | Stop reason: SDUPTHER

## 2022-07-14 RX ORDER — HYDROCHLOROTHIAZIDE 25 MG/1
25 TABLET ORAL DAILY
Qty: 90 TABLET | Refills: 1 | Status: SHIPPED | OUTPATIENT
Start: 2022-07-14 | End: 2023-01-12 | Stop reason: SDUPTHER

## 2022-07-14 RX ORDER — FAMOTIDINE 40 MG/1
40 TABLET, FILM COATED ORAL DAILY
Qty: 90 TABLET | Refills: 0 | Status: SHIPPED | OUTPATIENT
Start: 2022-07-14 | End: 2023-01-12

## 2022-07-14 RX ORDER — ROSUVASTATIN CALCIUM 5 MG/1
5 TABLET, COATED ORAL DAILY
Qty: 90 TABLET | Refills: 1 | Status: SHIPPED | OUTPATIENT
Start: 2022-07-14 | End: 2023-01-12 | Stop reason: SDUPTHER

## 2022-07-14 NOTE — PROGRESS NOTES
"[facility] Note    Chief Complaint      Chief Complaint   Patient presents with    Follow-up       History of Present Illness      Jasmin Alfaro is a 77 y.o.  AAF with HTN, HLD, AR, and Anxiety presents to  to fu chronic issues.  Last visit - 1/13/22     Mod/Severe NIK - Snoring - Fu by sleep clinic. Sleep study - 6/3/22 - Moderately severe Obstructive Sleep Apnea. She is planning to  her APAP 7/22/22.    Cervical Radiculopathy - MRI C-spine - 10/6/21- Spondylosis of the cervical spine, there is no severe canal stenosis. Fu by Ortho. C-spine Xrays - 2/25/22 - Mild degenerative changes as above and minimal instability. Pt on gabapentin 100 mg TID.       Cisco Heel pain - She c/o Cisco heel pain.  Xray 4/29/21 - Achilles enthesopathy and plantar calcaneal spur noted bilaterally. Gel insert helps. Calf pain improved with stretching. She tries to walk.  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 "It's been a lot better".      Chronic Left Knee pain -She has had this pain "for a couple yrs".  Tylenol - it helps a little but not much. It can be 2-8/10 in severity if she is on her legs too long.   No trauma. No swelling.  No erythema, or warmth.  No h/o Gout.   Cisco Knee Xray - 4/29/21 -  Moderate loss of tibiofemoral cartilage space with osteophyte production.  She has trouble going up stairs.  Her Rt knee gives out/mahad.  She would like a letter so she can get an apartment on the 1st floor in a senior Independent Living facility. She uses Tylenol daily.  She will alert me for any GI S.E.      Prediabetes - HA1c -6.1. Rec low carb diet.  Cont to monitor.  Repeat Ha1c.      HTN - Controlled on Atenolol 50 mg/d, Amlodipine 10 mg/d, HCTZ 25 mg QAm.     HLD - Pt controlled on Crestor 5 mg QHS in July.      AR -Pt on Claritin prn and Flonase daily.      Anxiety - Pt doing well off Lexapro 10 mg/d due to wt gain. Stable off meds.        GERD - Pt on Famotidine prn.  Rec Reflux prec. "      MNG - Pt prev fu by ENT, Dr. Patel.  She then fu for a second opinion at [facility] with Dr. Robyn Mann. She is now fu by Dr. Romero. Thyroid U/S - 11/13/20 - repeat 1-2 yr. Due for fu.      Osteopenia - h/o Rt humeral neck fracture.  She has not been able to go the gym due to the pandemic.  She tries to walk. Cont Calcium and Vit d.      Ingrown Toenail - Will refer to Podiatry, Dr. Oconnor.  Pt mentioned to nurse after visit was completed.      H/O PE - '74- She was tx at the time.  Pt no longer on Anticoagulation.      HCM - Flu - none - pt defers;  Tdap - ?< 10 yrs;  PCV 13 - 6/2/20;  PVX 23 - 7/14/21;  Shingrix - none- declines due to finances;  COVID 19 Vaccine #1 - 3/3/21; #2 - 3/26/21; # 3 - 1/10/22;  MGM - 6/27/22 - repeat 1 yr;  DEXA - 11/13/20 - + Osteopenia;  PAP - no longer gets; Hep C Screen - neg - 6/2/20; C-scope - 10/15/20  +Polyps- told to repeat in 3 yrs; GI - Dr. Sanchez;  Prev PCP - me then Dr. Kennedy; Ortho - Dr. Rodrigues; Ophtho - ; Endo - Dr. Romero; Podiatry - Dr. Oconnor    Patient Care Team:  Gloria Smith MD as PCP - General (Internal Medicine)  Izzy Alfaro MA as Care Coordinator     Health Maintenance:  Immunization History   Administered Date(s) Administered    COVID-19 MRNA, LN-S PF (MODERNA HALF 0.25 ML DOSE) 01/10/2022    COVID-19, MRNA, LN-S, PF (Pfizer) (Purple Cap) 03/03/2021, 03/26/2021    Pneumococcal Conjugate - 13 Valent 06/02/2020    Pneumococcal Polysaccharide - 23 Valent 07/14/2021      Health Maintenance   Topic Date Due    TETANUS VACCINE  Never done    DEXA Scan  11/13/2023    Lipid Panel  07/29/2026    Hepatitis C Screening  Completed        Past Medical History:  Past Medical History:   Diagnosis Date    Allergic rhinitis     Anxiety     Colon polyps     COVID-19 11/25/2020    Hyperlipidemia     Hypertension     Multiple thyroid nodules        Past Surgical History:   has a past surgical history that includes  Hysterectomy; Appendectomy; Incisional hernia repair; Laparotomy; and Biopsy of thyroid.    Family History:  family history includes Cancer in her mother; Colon cancer in her brother; Heart failure in her brother and mother; Hypertension in her brother, father, mother, and sister; Kidney failure in her brother and sister.     Social History:  Social History     Tobacco Use    Smoking status: Never Smoker    Smokeless tobacco: Never Used   Substance Use Topics    Alcohol use: Not Currently    Drug use: Never       Review of Systems   Constitutional: Negative for chills, diaphoresis and fever.   HENT: Negative for nasal congestion and sore throat.    Eyes: Negative for visual disturbance.   Respiratory: Negative for cough.    Cardiovascular: Negative for chest pain and palpitations.   Gastrointestinal: Negative for abdominal pain, constipation, diarrhea, nausea and vomiting.        She takes miralax prn. Will try Magnesium oxide 400 mg q other day   Genitourinary: Negative for dysuria and frequency.   Musculoskeletal: Positive for arthralgias. Negative for myalgias.        Left knee and leg cramps   Neurological: Positive for headaches. Negative for dizziness.        Not often   Psychiatric/Behavioral: Negative for dysphoric mood. The patient is not nervous/anxious.         Medications:    Current Outpatient Medications:     aspirin 81 MG Chew, Take 81 mg by mouth once daily. , Disp: , Rfl:     biotin 1 mg Cap, Take 1,000 mcg by mouth once daily. , Disp: , Rfl:     diclofenac sodium (VOLTAREN) 1 % Gel, Apply 2 g topically 4 (four) times daily., Disp: 1 Tube, Rfl: 3    EPINEPHrine (EPIPEN 2-YAA) 0.3 mg/0.3 mL AtIn, Inject 0.3 mLs (0.3 mg total) into the muscle as needed., Disp: 1 Device, Rfl: 0    fluticasone propionate (FLONASE) 50 mcg/actuation nasal spray, 2 sprays (100 mcg total) by Each Nostril route once daily., Disp: 48 g, Rfl: 3    gabapentin (NEURONTIN) 100 MG capsule, Take 1 capsule (100 mg total)  "by mouth 3 (three) times daily., Disp: 90 capsule, Rfl: 2    loratadine (CLARITIN) 10 mg tablet, Take 10 mg by mouth daily as needed. , Disp: , Rfl:     multivit with minerals/lutein (MULTIVITAMIN 50 PLUS ORAL), Take by mouth once daily. , Disp: , Rfl:     neomycin-polymyxin-dexamethasone (DEXACINE) 3.5 mg/g-10,000 unit/g-0.1 % Oint, 1/2 INCH LEFT EYE TWICE A DAY FOR 10 14 DAYS, Disp: , Rfl:     triamcinolone acetonide 0.1% (KENALOG) 0.1 % cream, Apply topically 2 (two) times daily., Disp: 80 g, Rfl: 0    amLODIPine (NORVASC) 10 MG tablet, Take 1 tablet (10 mg total) by mouth once daily., Disp: 90 tablet, Rfl: 1    atenoloL (TENORMIN) 50 MG tablet, Take 1 tablet (50 mg total) by mouth once daily., Disp: 90 tablet, Rfl: 1    famotidine (PEPCID) 40 MG tablet, Take 1 tablet (40 mg total) by mouth once daily., Disp: 90 tablet, Rfl: 0    hydroCHLOROthiazide (HYDRODIURIL) 25 MG tablet, Take 1 tablet (25 mg total) by mouth once daily., Disp: 90 tablet, Rfl: 1    rosuvastatin (CRESTOR) 5 MG tablet, Take 1 tablet (5 mg total) by mouth once daily., Disp: 90 tablet, Rfl: 1    tiZANidine (ZANAFLEX) 4 MG tablet, Take 4 mg by mouth every 6 (six) hours as needed., Disp: , Rfl:      Allergies:  Review of patient's allergies indicates:   Allergen Reactions    Meperidine Hives and Other (See Comments)     Unknown reaction         Physical Exam      Vital Signs  Temp: 98 °F (36.7 °C)  Pulse: 66  Resp: 18  SpO2: 99 %  BP: 124/60  BP Location: Right arm  Patient Position: Sitting  Pain Score: 0-No pain  Height and Weight  Height: 5' 3" (160 cm)  Weight: 80.8 kg (178 lb 2.1 oz)  BSA (Calculated - sq m): 1.9 sq meters  BMI (Calculated): 31.6  Weight in (lb) to have BMI = 25: 140.8      Patient Position: Sitting      Physical Exam  Vitals reviewed.   Constitutional:       General: She is not in acute distress.     Appearance: Normal appearance. She is not ill-appearing, toxic-appearing or diaphoretic.   HENT:      Head: " Normocephalic and atraumatic.      Right Ear: Tympanic membrane normal.      Left Ear: Tympanic membrane normal.   Eyes:      Comments: Left cataract   Neck:      Vascular: No carotid bruit.   Cardiovascular:      Rate and Rhythm: Normal rate and regular rhythm.      Pulses: Normal pulses.      Heart sounds: Normal heart sounds. No murmur heard.  Pulmonary:      Effort: No respiratory distress.      Breath sounds: Normal breath sounds. No wheezing.   Abdominal:      General: Bowel sounds are normal. There is no distension.      Palpations: Abdomen is soft.      Tenderness: There is no abdominal tenderness. There is no guarding or rebound.   Musculoskeletal:         General: Normal range of motion.   Skin:     General: Skin is warm and dry.   Neurological:      General: No focal deficit present.      Mental Status: She is alert and oriented to person, place, and time.   Psychiatric:         Mood and Affect: Mood normal.         Behavior: Behavior normal.          Laboratory:    CMP:  Recent Labs   Lab 06/02/20  1117 07/12/21  0829 07/29/21  0919 01/21/22  0824   Glucose 111 H   < > 106 159 H   Calcium 10.1   < > 10.2 9.5   Albumin 4.5  --  3.9  --    Total Protein 8.1  --  7.6  --    Sodium 136   < > 140 135   Potassium 3.9   < > 3.6 4.0   CO2 28   < > 27 26   Chloride 100   < > 104 97 L   BUN 16   < > 21 22   Creatinine 1.1   < > 1.2 1.05 H   Alkaline Phosphatase 80  --  69  --    ALT 19  --  20  --    AST 28  --  30  --    Total Bilirubin 0.5  --  0.6  --     < > = values in this interval not displayed.       URINALYSIS:  Recent Labs   Lab 03/02/20  1553 05/12/22  1018   Color, UA yellow  --    Spec Grav UA 1.005  --    pH, UA 7 6.0   Nitrite, UA negative  --    Urobilinogen, UA normal  --         LIPIDS:  Recent Labs   Lab 06/02/20  1117 10/01/20  0925 07/29/21  0919 01/21/22  0824   TSH  --  1.549  --  2.23   HDL 53  --  44  --    Cholesterol 149  --  143  --    Triglycerides 83  --  82  --    LDL Cholesterol  79.4  --  82.6  --    HDL/Cholesterol Ratio 35.6  --  30.8  --    Non-HDL Cholesterol 96  --  99  --    Total Cholesterol/HDL Ratio 2.8  --  3.3  --        TSH:  Recent Labs   Lab 10/01/20  0925 01/21/22  0824   TSH 1.549 2.23       A1C:  Recent Labs   Lab 10/01/20  0925 07/29/21  0919   Hemoglobin A1C 6.2 H 6.1 H       Other:   Recent Labs   Lab 10/01/20  0925   Vit D, 25-Hydroxy 37     Recent Labs   Lab 06/02/20  1117   Hepatitis C Ab Negative       Assessment/Plan     Jasmin Alfaro is a 77 y.o.female with:    Hypertension, unspecified type  -     Comprehensive Metabolic Panel; Future; Expected date: 07/14/2022  -     hydroCHLOROthiazide (HYDRODIURIL) 25 MG tablet; Take 1 tablet (25 mg total) by mouth once daily.  Dispense: 90 tablet; Refill: 1  -     atenoloL (TENORMIN) 50 MG tablet; Take 1 tablet (50 mg total) by mouth once daily.  Dispense: 90 tablet; Refill: 1  -     amLODIPine (NORVASC) 10 MG tablet; Take 1 tablet (10 mg total) by mouth once daily.  Dispense: 90 tablet; Refill: 1  - Controlled.  Cont current.     Hyperlipidemia, unspecified hyperlipidemia type  -     Comprehensive Metabolic Panel; Future; Expected date: 07/14/2022  -     Lipid Panel; Future; Expected date: 07/14/2022  -     rosuvastatin (CRESTOR) 5 MG tablet; Take 1 tablet (5 mg total) by mouth once daily.  Dispense: 90 tablet; Refill: 1  - Controlled.  Cont current.     Prediabetes  -     Hemoglobin A1C; Future; Expected date: 07/14/2022  - Controlled.  Cont current.     Multiple thyroid nodules  -     US Soft Tissue Head Neck Thyroid; Future; Expected date: 07/14/2022  - Will recheck Thyroid U/S.     Gastroesophageal reflux disease, unspecified whether esophagitis present  -     famotidine (PEPCID) 40 MG tablet; Take 1 tablet (40 mg total) by mouth once daily.  Dispense: 90 tablet; Refill: 0  - Stable.  Cont current regimen.    Leg cramp  -     CK; Future; Expected date: 07/14/2022  - Check CK. Will try OTC magnesium oxide 400 mg/d.      Osteopenia, unspecified location   - Stable.  Cont current regimen.    NIK (obstructive sleep apnea)  -Fu by sleep clinic. Sleep study - 6/3/22 - Moderately severe Obstructive Sleep Apnea. She is planning to  her APAP 7/22/22.    Chronic conditions status updated as per HPI.  Other than changes above, cont current medications and maintain follow up with specialists.   Follow up in about 6 months (around 1/14/2023) for fu chronic issues or sooner if needed.      Gloria Smith MD  [facility]

## 2022-08-03 ENCOUNTER — HOSPITAL ENCOUNTER (OUTPATIENT)
Dept: RADIOLOGY | Facility: OTHER | Age: 77
Discharge: HOME | End: 2022-08-03
Attending: INTERNAL MEDICINE
Payer: MEDICARE

## 2022-08-03 DIAGNOSIS — E04.2 MULTIPLE THYROID NODULES: ICD-10-CM

## 2022-08-03 PROCEDURE — 76536 US EXAM OF HEAD AND NECK: CPT | Mod: TC

## 2022-08-03 PROCEDURE — 76536 US EXAM OF HEAD AND NECK: CPT | Mod: 26,,, | Performed by: RADIOLOGY

## 2022-08-03 PROCEDURE — 76536 US SOFT TISSUE HEAD NECK THYROID: ICD-10-PCS | Mod: 26,,, | Performed by: RADIOLOGY

## 2022-08-03 NOTE — PROGRESS NOTES
I sent pt a my chart message -  I reviewed your thyroid ultrasound.  You still have a multinodular goiter. There were 8 nodules all of which appear stable. The largest nodule was 1.4 cm.  No further intervention necessary at this time.     Dr. GALVAN

## 2022-08-05 ENCOUNTER — TELEPHONE (OUTPATIENT)
Dept: PRIMARY CARE CLINIC | Facility: CLINIC | Age: 77
End: 2022-08-05
Payer: MEDICARE

## 2022-08-05 NOTE — TELEPHONE ENCOUNTER
----- Message from Gloria Smith MD sent at 8/3/2022  4:40 PM CDT -----  I sent pt a my chart message -  I reviewed your thyroid ultrasound.  You still have a multinodular goiter. There were 8 nodules all of which appear stable. The largest nodule was 1.4 cm.  No further intervention necessary at this time.     Dr. GALVAN      I sent pt a my chart message -  I reviewed your labs.  Your Ha11c was 6.4 which is consistent with prediabetes. Rec a low carb diet.   Your Cholesterol looked good.  Your kidney function and liver functions looked good.  It turcios appear you could increase your hydration. Your CK level was slightly high but stable at 247.  How are your leg cramps?  Did you try the try OTC magnesium oxide 400 mg/d? No further recommendations at this time.     Dr. GALVAN

## 2022-08-05 NOTE — TELEPHONE ENCOUNTER
Below results reviewed with the pt, and she verbalized understanding.     Pt reports that the leg cramps have gotten better since starting magnesium.

## 2022-08-08 ENCOUNTER — PATIENT MESSAGE (OUTPATIENT)
Dept: PRIMARY CARE CLINIC | Facility: CLINIC | Age: 77
End: 2022-08-08
Payer: MEDICARE

## 2022-08-08 DIAGNOSIS — M54.12 CERVICAL RADICULOPATHY: Primary | ICD-10-CM

## 2022-08-10 ENCOUNTER — OFFICE VISIT (OUTPATIENT)
Dept: ORTHOPEDICS | Facility: CLINIC | Age: 77
End: 2022-08-10
Payer: MEDICARE

## 2022-08-10 VITALS — WEIGHT: 173.5 LBS | BODY MASS INDEX: 30.74 KG/M2 | HEIGHT: 63 IN

## 2022-08-10 DIAGNOSIS — M54.2 NECK PAIN: ICD-10-CM

## 2022-08-10 DIAGNOSIS — M50.30 DDD (DEGENERATIVE DISC DISEASE), CERVICAL: Primary | ICD-10-CM

## 2022-08-10 PROCEDURE — 1159F PR MEDICATION LIST DOCUMENTED IN MEDICAL RECORD: ICD-10-PCS | Mod: CPTII,S$GLB,, | Performed by: PHYSICIAN ASSISTANT

## 2022-08-10 PROCEDURE — 1160F PR REVIEW ALL MEDS BY PRESCRIBER/CLIN PHARMACIST DOCUMENTED: ICD-10-PCS | Mod: CPTII,S$GLB,, | Performed by: PHYSICIAN ASSISTANT

## 2022-08-10 PROCEDURE — 1159F MED LIST DOCD IN RCRD: CPT | Mod: CPTII,S$GLB,, | Performed by: PHYSICIAN ASSISTANT

## 2022-08-10 PROCEDURE — 1160F RVW MEDS BY RX/DR IN RCRD: CPT | Mod: CPTII,S$GLB,, | Performed by: PHYSICIAN ASSISTANT

## 2022-08-10 PROCEDURE — 99999 PR PBB SHADOW E&M-EST. PATIENT-LVL III: CPT | Mod: PBBFAC,,, | Performed by: PHYSICIAN ASSISTANT

## 2022-08-10 PROCEDURE — 99213 PR OFFICE/OUTPT VISIT, EST, LEVL III, 20-29 MIN: ICD-10-PCS | Mod: S$GLB,,, | Performed by: PHYSICIAN ASSISTANT

## 2022-08-10 PROCEDURE — 3288F PR FALLS RISK ASSESSMENT DOCUMENTED: ICD-10-PCS | Mod: CPTII,S$GLB,, | Performed by: PHYSICIAN ASSISTANT

## 2022-08-10 PROCEDURE — 1125F PR PAIN SEVERITY QUANTIFIED, PAIN PRESENT: ICD-10-PCS | Mod: CPTII,S$GLB,, | Performed by: PHYSICIAN ASSISTANT

## 2022-08-10 PROCEDURE — 1101F PR PT FALLS ASSESS DOC 0-1 FALLS W/OUT INJ PAST YR: ICD-10-PCS | Mod: CPTII,S$GLB,, | Performed by: PHYSICIAN ASSISTANT

## 2022-08-10 PROCEDURE — 3288F FALL RISK ASSESSMENT DOCD: CPT | Mod: CPTII,S$GLB,, | Performed by: PHYSICIAN ASSISTANT

## 2022-08-10 PROCEDURE — 1101F PT FALLS ASSESS-DOCD LE1/YR: CPT | Mod: CPTII,S$GLB,, | Performed by: PHYSICIAN ASSISTANT

## 2022-08-10 PROCEDURE — 99213 OFFICE O/P EST LOW 20 MIN: CPT | Mod: S$GLB,,, | Performed by: PHYSICIAN ASSISTANT

## 2022-08-10 PROCEDURE — 99999 PR PBB SHADOW E&M-EST. PATIENT-LVL III: ICD-10-PCS | Mod: PBBFAC,,, | Performed by: PHYSICIAN ASSISTANT

## 2022-08-10 PROCEDURE — 1125F AMNT PAIN NOTED PAIN PRSNT: CPT | Mod: CPTII,S$GLB,, | Performed by: PHYSICIAN ASSISTANT

## 2022-08-10 RX ORDER — TIZANIDINE 4 MG/1
4 TABLET ORAL EVERY 6 HOURS PRN
Qty: 60 TABLET | Refills: 0 | Status: SHIPPED | OUTPATIENT
Start: 2022-08-10 | End: 2022-08-17

## 2022-08-10 NOTE — PROGRESS NOTES
DATE: 8/10/2022  PATIENT: Jasmin Alfaro    Attending Physician: Deng Khanna M.D.    HISTORY:  Jasmin Alfaro is a 77 y.o. female who returns to me today for follow up of neck pain.  She was last seen by me 10/11/2021.  Today she is doing well but notes she never had the injection done because her pain improved.  On Friday, she started having increased right sided neck pain, no arm pain.  No numbness or tingling. No weakness. She says she was carrying some water bottles. The pain is worse with activity and improvedb y laying down.  She has tried tylenol with little relief.     The Patient denies myelopathic symptoms such as handwriting changes or difficulty with buttons/coins/keys. Denies perineal paresthesias, bowel/bladder dysfunction.    PMH/PSH/FamHx/SocHx:  Unchanged from prior visit    ROS:  REVIEW OF SYSTEMS:  Constitution: Negative. Negative for chills, fever and night sweats.   HENT: Negative for congestion and headaches.    Eyes: Negative for blurred vision, left vision loss and right vision loss.   Cardiovascular: Negative for chest pain and syncope.   Respiratory: Negative for cough and shortness of breath.    Endocrine: Negative for polydipsia, polyphagia and polyuria.   Hematologic/Lymphatic: Negative for bleeding problem. Does not bruise/bleed easily.   Skin: Negative for dry skin, itching and rash.   Musculoskeletal: Negative for falls and muscle weakness.   Gastrointestinal: Negative for abdominal pain and bowel incontinence.   Allergic/Immunologic: Negative for hives and persistent infections.  Genitourinary: Negative for urinary retention/incontinence and nocturia.   Neurological: negative for disturbances in coordination, no myelopathic symptoms such as handwriting changes or difficulty with buttons, coins, keys or small objects. No loss of balance and seizures.   Psychiatric/Behavioral: Negative for depression. The patient does not have insomnia.   Denies myelopathic  "symptoms, perineal paresthesias, bowel or bladder incontinence    EXAM:  Ht 5' 3" (1.6 m)   Wt 78.7 kg (173 lb 8 oz)   BMI 30.73 kg/m²     My physical examination was notable for the following findings:     Normal station and gait, no difficulty with toe or heel walk.   Cervical skin negative for rashes, lesions, hairy patches and surgical scars.   Cervical range of motion is acceptable.  There is mild right sided tenderness to palpation.  No pain with range of motion of the bilateral shoulders and elbows.  Normal bulk and contour of the bilateral hands.    Bilateral hands warm and well perfused, radial pulses 2+ bilaterally.   Normal strength and tone in all major motor groups in the bilateral upper and lower extremities. Normal sensation to light touch in the C5-T1 and L2-S1 dermatomes bilaterally.   Deep tendon reflexes symmetric 2+ in the bilateral upper and lower extremities.  Negative Inverted Radial Reflex and Tinajero's bilaterally. Negative Babinski bilaterally.     IMAGING:    Today I personally re-reviewed AP, Lat and Flex/Ex  upright C-spine films that demonstrate mild degenerative changes.    MRI cervical spine demonstrates mild spondylosis without significant spinal stenosis.  Moderate right foraminal narrowing at C3/4.     Body mass index is 30.73 kg/m².    Hemoglobin A1C   Date Value Ref Range Status   08/03/2022 6.4 (H) 4.0 - 5.6 % Final     Comment:     ADA Screening Guidelines:  5.7-6.4%  Consistent with prediabetes  >or=6.5%  Consistent with diabetes    High levels of fetal hemoglobin interfere with the HbA1C  assay. Heterozygous hemoglobin variants (HbS, HgC, etc)do  not significantly interfere with this assay.   However, presence of multiple variants may affect accuracy.     07/29/2021 6.1 (H) 4.0 - 5.6 % Final     Comment:     ADA Screening Guidelines:  5.7-6.4%  Consistent with prediabetes  >or=6.5%  Consistent with diabetes    High levels of fetal hemoglobin interfere with the HbA1C  assay. " Heterozygous hemoglobin variants (HbS, HgC, etc)do  not significantly interfere with this assay.   However, presence of multiple variants may affect accuracy.     10/01/2020 6.2 (H) 4.0 - 5.6 % Final     Comment:     ADA Screening Guidelines:  5.7-6.4%  Consistent with prediabetes  >or=6.5%  Consistent with diabetes  High levels of fetal hemoglobin interfere with the HbA1C  assay. Heterozygous hemoglobin variants (HbS, HgC, etc)do  not significantly interfere with this assay.   However, presence of multiple variants may affect accuracy.           ASSESSMENT/PLAN:    Jasmin was seen today for neck pain.    Diagnoses and all orders for this visit:    DDD (degenerative disc disease), cervical  -     Ambulatory referral/consult to Physical/Occupational Therapy; Future  -     tiZANidine (ZANAFLEX) 4 MG tablet; Take 1 tablet (4 mg total) by mouth every 6 (six) hours as needed (pain).    Neck pain  -     Ambulatory referral/consult to Physical/Occupational Therapy; Future  -     tiZANidine (ZANAFLEX) 4 MG tablet; Take 1 tablet (4 mg total) by mouth every 6 (six) hours as needed (pain).      Prescription for zanaflex sent to the pharmacy.  Referral for PT placed today.  Follow up as needed.

## 2022-08-22 ENCOUNTER — TELEPHONE (OUTPATIENT)
Dept: ORTHOPEDICS | Facility: CLINIC | Age: 77
End: 2022-08-22
Payer: MEDICARE

## 2022-08-22 DIAGNOSIS — M54.12 CERVICAL RADICULOPATHY: Primary | ICD-10-CM

## 2022-08-22 NOTE — TELEPHONE ENCOUNTER
Spoke to patient and informed her that we will refer her to a physical therapy location that is in network. Patient verbalized understanding.     ----- Message from Phyllis Chaudhari sent at 8/22/2022  2:23 PM CDT -----  Regarding: PHYSICAL THERAPY IS OUT OF NETWORK WITH [facility] INSURANCE  Contact: PT  Pt's requesting a call from staff regarding son one who is in network....     Confirmed contact info below:  Contact Name: Jasmin Alfaro  Phone Number: [phone]

## 2022-09-21 ENCOUNTER — IMMUNIZATION (OUTPATIENT)
Dept: INTERNAL MEDICINE | Facility: CLINIC | Age: 77
End: 2022-09-21
Payer: MEDICARE

## 2022-09-21 DIAGNOSIS — Z23 NEED FOR VACCINATION: Primary | ICD-10-CM

## 2022-09-21 PROCEDURE — 91312 COVID-19, MRNA, LNP-S, BIVALENT BOOSTER, PF, 30 MCG/0.3 ML DOSE: CPT | Mod: S$GLB,,, | Performed by: INTERNAL MEDICINE

## 2022-09-21 PROCEDURE — 0124A COVID-19, MRNA, LNP-S, BIVALENT BOOSTER, PF, 30 MCG/0.3 ML DOSE: CPT | Mod: PBBFAC | Performed by: INTERNAL MEDICINE

## 2022-09-21 PROCEDURE — 91312 COVID-19, MRNA, LNP-S, BIVALENT BOOSTER, PF, 30 MCG/0.3 ML DOSE: ICD-10-PCS | Mod: S$GLB,,, | Performed by: INTERNAL MEDICINE

## 2022-10-13 NOTE — PROGRESS NOTES
"[facility] Note    Chief Complaint    Abdominal pain    History of Present Illness      Jasmin Alfaro is a 77 y.o. female who presents today for abdominal pain. She states she has chronic constipation and has been taking metamucil for this but feels like metamucil has stopped "working" for her. She reports dark colored stool but not black nor blood in stool. She has not been diagnosed with constipation in her past medical history. Last colonoscopy was done 2020 with recommendation to f/u with next colonoscopy in 2023. She denies any SOB, chest pain, N/V, unintentional weight loss, loss of appetite, fatigue. 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      Problem List Items Addressed This Visit    None  Visit Diagnoses       Abdominal pain, unspecified abdominal location    -  Primary    Relevant Orders    US Abdomen Complete    Urinalysis, Reflex to Urine Culture Urine, Clean Catch            Review of Systems   Constitutional: Negative.    HENT: Negative.     Eyes: Negative.    Respiratory: Negative.     Cardiovascular: Negative.    Gastrointestinal:  Positive for abdominal pain and constipation.   Genitourinary: Negative.    Musculoskeletal: Negative.    Skin: Negative.    Neurological: Negative.    Endo/Heme/Allergies: Negative.    Psychiatric/Behavioral: Negative.        Past Medical History:  Past Medical History:   Diagnosis Date    Allergic rhinitis     Anxiety     Colon polyps     COVID-19 11/25/2020    Hyperlipidemia     Hypertension     Multiple thyroid nodules        Past Surgical History:  Past Surgical History:   Procedure Laterality Date    APPENDECTOMY      BIOPSY OF THYROID      HYSTERECTOMY      INCISIONAL HERNIA REPAIR      LAPAROTOMY         Family History:  family history includes Cancer in her mother; Colon cancer in her brother; Diabetes type I in her sister; Heart failure in her brother and " mother; Hypertension in her brother, father, mother, and sister; Hypothyroidism in her sister; Kidney failure in her brother and sister.   Family history was reviewed with patient.     Social History:  Social History     Socioeconomic History    Marital status: Single   Tobacco Use    Smoking status: Never    Smokeless tobacco: Never   Substance and Sexual Activity    Alcohol use: Not Currently    Drug use: Never    Sexual activity: Not Currently         Medications:  Outpatient Encounter Medications as of 10/19/2022   Medication Sig Dispense Refill    amLODIPine (NORVASC) 10 MG tablet Take 1 tablet (10 mg total) by mouth once daily. 90 tablet 1    aspirin 81 MG Chew Take 81 mg by mouth once daily.       atenoloL (TENORMIN) 50 MG tablet Take 1 tablet (50 mg total) by mouth once daily. 90 tablet 1    biotin 1 mg Cap Take 1,000 mcg by mouth once daily.       diclofenac sodium (VOLTAREN) 1 % Gel Apply 2 g topically 4 (four) times daily. 1 Tube 3    EPINEPHrine (EPIPEN 2-YAA) 0.3 mg/0.3 mL AtIn Inject 0.3 mLs (0.3 mg total) into the muscle as needed. 1 Device 0    famotidine (PEPCID) 40 MG tablet Take 1 tablet (40 mg total) by mouth once daily. 90 tablet 0    fluticasone propionate (FLONASE) 50 mcg/actuation nasal spray 2 sprays (100 mcg total) by Each Nostril route once daily. 48 g 3    hydroCHLOROthiazide (HYDRODIURIL) 25 MG tablet Take 1 tablet (25 mg total) by mouth once daily. 90 tablet 1    loratadine (CLARITIN) 10 mg tablet Take 10 mg by mouth daily as needed.       multivit with minerals/lutein (MULTIVITAMIN 50 PLUS ORAL) Take by mouth once daily.       neomycin-polymyxin-dexamethasone (DEXACINE) 3.5 mg/g-10,000 unit/g-0.1 % Oint 1/2 INCH LEFT EYE TWICE A DAY FOR 10 14 DAYS      rosuvastatin (CRESTOR) 5 MG tablet Take 1 tablet (5 mg total) by mouth once daily. 90 tablet 1    triamcinolone acetonide 0.1% (KENALOG) 0.1 % cream Apply topically 2 (two) times daily. 80 g 0    gabapentin (NEURONTIN) 100 MG capsule  "Take 1 capsule (100 mg total) by mouth 3 (three) times daily. 90 capsule 2     No facility-administered encounter medications on file as of 10/19/2022.       Allergies:  Review of patient's allergies indicates:   Allergen Reactions    Meperidine Hives and Other (See Comments)     Unknown reaction         Health Maintenance:  Health Maintenance   Topic Date Due    TETANUS VACCINE  Never done    DEXA Scan  11/13/2023    Lipid Panel  08/03/2027    Hepatitis C Screening  Completed     Health Maintenance Topics with due status: Not Due       Topic Last Completion Date    DEXA Scan 11/13/2020    Lipid Panel 08/03/2022       Physical Exam      Vital Signs  Temp: 99.1 °F (37.3 °C)  Pulse: 76  SpO2: 97 %  BP: (!) 158/66  Pain Score:   3  Pain Loc: Abdomen  Height and Weight  Height: 5' 3" (160 cm)  Weight: 81.8 kg (180 lb 4.8 oz)  BSA (Calculated - sq m): 1.91 sq meters  BMI (Calculated): 31.9  Weight in (lb) to have BMI = 25: 140.8]    Physical Exam  Constitutional:       Appearance: Normal appearance. She is obese.   HENT:      Head: Normocephalic and atraumatic.      Nose: Nose normal.      Mouth/Throat:      Mouth: Mucous membranes are moist.      Pharynx: Oropharynx is clear.   Eyes:      Extraocular Movements: Extraocular movements intact.      Conjunctiva/sclera: Conjunctivae normal.      Pupils: Pupils are equal, round, and reactive to light.   Cardiovascular:      Rate and Rhythm: Normal rate and regular rhythm.      Pulses: Normal pulses.      Heart sounds: Normal heart sounds.   Pulmonary:      Effort: Pulmonary effort is normal.      Breath sounds: Normal breath sounds.   Abdominal:      General: Abdomen is flat. Bowel sounds are normal.      Palpations: Abdomen is soft.      Tenderness: There is abdominal tenderness.   Musculoskeletal:         General: Normal range of motion.      Cervical back: Normal range of motion and neck supple.   Skin:     General: Skin is warm and dry.      Capillary Refill: Capillary " refill takes less than 2 seconds.   Neurological:      General: No focal deficit present.      Mental Status: She is alert and oriented to person, place, and time. Mental status is at baseline.   Psychiatric:         Mood and Affect: Mood normal.         Behavior: Behavior normal.         Thought Content: Thought content normal.         Judgment: Judgment normal.        Laboratory:  CBC:      CMP:  Recent Labs   Lab 06/02/20  1117 07/12/21  0829 07/29/21  0919 01/21/22  0824 08/03/22  1024   Glucose 111 H   < > 106   < > 117 H   Calcium 10.1   < > 10.2   < > 9.7   Albumin 4.5  --  3.9  --  4.1   Total Protein 8.1  --  7.6  --  7.7   Sodium 136   < > 140   < > 140   Potassium 3.9   < > 3.6   < > 3.6   CO2 28   < > 27   < > 25   Chloride 100   < > 104   < > 105   BUN 16   < > 21   < > 21   Alkaline Phosphatase 80  --  69  --  88   ALT 19  --  20  --  20   AST 28  --  30  --  29   Total Bilirubin 0.5  --  0.6  --  0.6    < > = values in this interval not displayed.     URINALYSIS:  Recent Labs   Lab 03/02/20  1553 05/12/22  1018   Color, UA yellow  --    Spec Grav UA 1.005  --    pH, UA 7 6.0   Nitrite, UA negative  --    Urobilinogen, UA normal  --       LIPIDS:  Recent Labs   Lab 06/02/20  1117 10/01/20  0925 07/29/21  0919 01/21/22  0824 08/03/22  1024   TSH  --  1.549  --  2.23  --    HDL 53  --  44  --  44   Cholesterol 149  --  143  --  161   Triglycerides 83  --  82  --  101   LDL Cholesterol 79.4  --  82.6  --  96.8   HDL/Cholesterol Ratio 35.6  --  30.8  --  27.3   Non-HDL Cholesterol 96  --  99  --  117   Total Cholesterol/HDL Ratio 2.8  --  3.3  --  3.7     TSH:  Recent Labs   Lab 10/01/20  0925 01/21/22  0824   TSH 1.549 2.23     A1C:  Recent Labs   Lab 10/01/20  0925 07/29/21  0919 08/03/22  1024   Hemoglobin A1C 6.2 H 6.1 H 6.4 H       Radiology:        Assessment/Plan     Jasmin Alfaro is a 77 y.o.female with:    Abdominal pain, unspecified abdominal location  -     US Abdomen Complete; Future;  Expected date: 10/19/2022  -     Urinalysis, Reflex to Urine Culture Urine, Clean Catch    - Increase water intake  - [place] as directed  - Colace as directed.    As above, continue current medications and maintain follow up with specialists.  Return to clinic as needed.    I spent 26 minutes on the day of this encounter for preparing, evaluating, examining, treating, and discussing plan of care with this patient.  Greater than 50% of this time was spent face to face with patient.  All questions were answered to patient's satisfaction.         Elena West, NP-C  [facility]

## 2022-10-19 ENCOUNTER — OFFICE VISIT (OUTPATIENT)
Dept: PRIMARY CARE CLINIC | Facility: CLINIC | Age: 77
End: 2022-10-19
Payer: MEDICARE

## 2022-10-19 VITALS
OXYGEN SATURATION: 97 % | BODY MASS INDEX: 31.95 KG/M2 | TEMPERATURE: 99 F | HEIGHT: 63 IN | WEIGHT: 180.31 LBS | SYSTOLIC BLOOD PRESSURE: 158 MMHG | HEART RATE: 76 BPM | DIASTOLIC BLOOD PRESSURE: 66 MMHG

## 2022-10-19 DIAGNOSIS — R10.9 ABDOMINAL PAIN, UNSPECIFIED ABDOMINAL LOCATION: Primary | ICD-10-CM

## 2022-10-19 PROCEDURE — 1159F MED LIST DOCD IN RCRD: CPT | Mod: CPTII,S$GLB,, | Performed by: NURSE PRACTITIONER

## 2022-10-19 PROCEDURE — 1159F PR MEDICATION LIST DOCUMENTED IN MEDICAL RECORD: ICD-10-PCS | Mod: CPTII,S$GLB,, | Performed by: NURSE PRACTITIONER

## 2022-10-19 PROCEDURE — 3078F PR MOST RECENT DIASTOLIC BLOOD PRESSURE < 80 MM HG: ICD-10-PCS | Mod: CPTII,S$GLB,, | Performed by: NURSE PRACTITIONER

## 2022-10-19 PROCEDURE — 1125F AMNT PAIN NOTED PAIN PRSNT: CPT | Mod: CPTII,S$GLB,, | Performed by: NURSE PRACTITIONER

## 2022-10-19 PROCEDURE — 1101F PR PT FALLS ASSESS DOC 0-1 FALLS W/OUT INJ PAST YR: ICD-10-PCS | Mod: CPTII,S$GLB,, | Performed by: NURSE PRACTITIONER

## 2022-10-19 PROCEDURE — 1101F PT FALLS ASSESS-DOCD LE1/YR: CPT | Mod: CPTII,S$GLB,, | Performed by: NURSE PRACTITIONER

## 2022-10-19 PROCEDURE — 3077F PR MOST RECENT SYSTOLIC BLOOD PRESSURE >= 140 MM HG: ICD-10-PCS | Mod: CPTII,S$GLB,, | Performed by: NURSE PRACTITIONER

## 2022-10-19 PROCEDURE — 3077F SYST BP >= 140 MM HG: CPT | Mod: CPTII,S$GLB,, | Performed by: NURSE PRACTITIONER

## 2022-10-19 PROCEDURE — 99214 PR OFFICE/OUTPT VISIT, EST, LEVL IV, 30-39 MIN: ICD-10-PCS | Mod: S$GLB,,, | Performed by: NURSE PRACTITIONER

## 2022-10-19 PROCEDURE — 1125F PR PAIN SEVERITY QUANTIFIED, PAIN PRESENT: ICD-10-PCS | Mod: CPTII,S$GLB,, | Performed by: NURSE PRACTITIONER

## 2022-10-19 PROCEDURE — 99999 PR PBB SHADOW E&M-EST. PATIENT-LVL IV: CPT | Mod: PBBFAC,,, | Performed by: NURSE PRACTITIONER

## 2022-10-19 PROCEDURE — 1160F RVW MEDS BY RX/DR IN RCRD: CPT | Mod: CPTII,S$GLB,, | Performed by: NURSE PRACTITIONER

## 2022-10-19 PROCEDURE — 3288F FALL RISK ASSESSMENT DOCD: CPT | Mod: CPTII,S$GLB,, | Performed by: NURSE PRACTITIONER

## 2022-10-19 PROCEDURE — 1160F PR REVIEW ALL MEDS BY PRESCRIBER/CLIN PHARMACIST DOCUMENTED: ICD-10-PCS | Mod: CPTII,S$GLB,, | Performed by: NURSE PRACTITIONER

## 2022-10-19 PROCEDURE — 99999 PR PBB SHADOW E&M-EST. PATIENT-LVL IV: ICD-10-PCS | Mod: PBBFAC,,, | Performed by: NURSE PRACTITIONER

## 2022-10-19 PROCEDURE — 3288F PR FALLS RISK ASSESSMENT DOCUMENTED: ICD-10-PCS | Mod: CPTII,S$GLB,, | Performed by: NURSE PRACTITIONER

## 2022-10-19 PROCEDURE — 99214 OFFICE O/P EST MOD 30 MIN: CPT | Mod: S$GLB,,, | Performed by: NURSE PRACTITIONER

## 2022-10-19 PROCEDURE — 3078F DIAST BP <80 MM HG: CPT | Mod: CPTII,S$GLB,, | Performed by: NURSE PRACTITIONER

## 2022-10-19 PROCEDURE — 81003 URINALYSIS AUTO W/O SCOPE: CPT | Performed by: NURSE PRACTITIONER

## 2022-10-20 LAB
BILIRUB UR QL STRIP: NEGATIVE
CLARITY UR REFRACT.AUTO: CLEAR
COLOR UR AUTO: COLORLESS
GLUCOSE UR QL STRIP: NEGATIVE
HGB UR QL STRIP: NEGATIVE
KETONES UR QL STRIP: NEGATIVE
LEUKOCYTE ESTERASE UR QL STRIP: NEGATIVE
NITRITE UR QL STRIP: NEGATIVE
PH UR STRIP: 7 [PH] (ref 5–8)
PROT UR QL STRIP: NEGATIVE
SP GR UR STRIP: 1.01 (ref 1–1.03)
URN SPEC COLLECT METH UR: ABNORMAL

## 2022-10-24 ENCOUNTER — TELEPHONE (OUTPATIENT)
Dept: SLEEP MEDICINE | Facility: CLINIC | Age: 77
End: 2022-10-24
Payer: MEDICARE

## 2022-10-24 NOTE — TELEPHONE ENCOUNTER
----- Message from Juliane Arthur sent at 10/24/2022  1:40 PM CDT -----  Regarding: Needs new tubing  Pt needs new tubing for C-Pap machine and needs to know how to go about receiving it. She can be reached at [phone] for assistance.

## 2022-10-26 ENCOUNTER — TELEPHONE (OUTPATIENT)
Dept: SLEEP MEDICINE | Facility: CLINIC | Age: 77
End: 2022-10-26
Payer: MEDICARE

## 2022-10-26 DIAGNOSIS — G47.33 OSA (OBSTRUCTIVE SLEEP APNEA): Primary | ICD-10-CM

## 2022-11-03 ENCOUNTER — HOSPITAL ENCOUNTER (OUTPATIENT)
Dept: RADIOLOGY | Facility: OTHER | Age: 77
Discharge: HOME | End: 2022-11-03
Attending: NURSE PRACTITIONER
Payer: MEDICARE

## 2022-11-03 ENCOUNTER — OFFICE VISIT (OUTPATIENT)
Dept: SLEEP MEDICINE | Facility: CLINIC | Age: 77
End: 2022-11-03
Payer: MEDICARE

## 2022-11-03 VITALS
DIASTOLIC BLOOD PRESSURE: 67 MMHG | WEIGHT: 176.38 LBS | HEIGHT: 67 IN | BODY MASS INDEX: 27.68 KG/M2 | HEART RATE: 60 BPM | SYSTOLIC BLOOD PRESSURE: 139 MMHG

## 2022-11-03 DIAGNOSIS — F51.09 OTHER INSOMNIA NOT DUE TO A SUBSTANCE OR KNOWN PHYSIOLOGICAL CONDITION: ICD-10-CM

## 2022-11-03 DIAGNOSIS — R10.9 ABDOMINAL PAIN, UNSPECIFIED ABDOMINAL LOCATION: ICD-10-CM

## 2022-11-03 DIAGNOSIS — G47.33 OSA (OBSTRUCTIVE SLEEP APNEA): ICD-10-CM

## 2022-11-03 DIAGNOSIS — J30.9 ALLERGIC SINUSITIS: Primary | ICD-10-CM

## 2022-11-03 PROCEDURE — 99999 PR PBB SHADOW E&M-EST. PATIENT-LVL III: ICD-10-PCS | Mod: PBBFAC,,, | Performed by: INTERNAL MEDICINE

## 2022-11-03 PROCEDURE — 1159F PR MEDICATION LIST DOCUMENTED IN MEDICAL RECORD: ICD-10-PCS | Mod: CPTII,S$GLB,, | Performed by: INTERNAL MEDICINE

## 2022-11-03 PROCEDURE — 1159F MED LIST DOCD IN RCRD: CPT | Mod: CPTII,S$GLB,, | Performed by: INTERNAL MEDICINE

## 2022-11-03 PROCEDURE — 76700 US ABDOMEN COMPLETE: ICD-10-PCS | Mod: 26,,, | Performed by: RADIOLOGY

## 2022-11-03 PROCEDURE — 99214 PR OFFICE/OUTPT VISIT, EST, LEVL IV, 30-39 MIN: ICD-10-PCS | Mod: S$GLB,,, | Performed by: INTERNAL MEDICINE

## 2022-11-03 PROCEDURE — 99214 OFFICE O/P EST MOD 30 MIN: CPT | Mod: S$GLB,,, | Performed by: INTERNAL MEDICINE

## 2022-11-03 PROCEDURE — 3075F PR MOST RECENT SYSTOLIC BLOOD PRESS GE 130-139MM HG: ICD-10-PCS | Mod: CPTII,S$GLB,, | Performed by: INTERNAL MEDICINE

## 2022-11-03 PROCEDURE — 3078F PR MOST RECENT DIASTOLIC BLOOD PRESSURE < 80 MM HG: ICD-10-PCS | Mod: CPTII,S$GLB,, | Performed by: INTERNAL MEDICINE

## 2022-11-03 PROCEDURE — 1101F PR PT FALLS ASSESS DOC 0-1 FALLS W/OUT INJ PAST YR: ICD-10-PCS | Mod: CPTII,S$GLB,, | Performed by: INTERNAL MEDICINE

## 2022-11-03 PROCEDURE — 76700 US EXAM ABDOM COMPLETE: CPT | Mod: TC

## 2022-11-03 PROCEDURE — 3288F PR FALLS RISK ASSESSMENT DOCUMENTED: ICD-10-PCS | Mod: CPTII,S$GLB,, | Performed by: INTERNAL MEDICINE

## 2022-11-03 PROCEDURE — 76700 US EXAM ABDOM COMPLETE: CPT | Mod: 26,,, | Performed by: RADIOLOGY

## 2022-11-03 PROCEDURE — 1101F PT FALLS ASSESS-DOCD LE1/YR: CPT | Mod: CPTII,S$GLB,, | Performed by: INTERNAL MEDICINE

## 2022-11-03 PROCEDURE — 3075F SYST BP GE 130 - 139MM HG: CPT | Mod: CPTII,S$GLB,, | Performed by: INTERNAL MEDICINE

## 2022-11-03 PROCEDURE — 1126F PR PAIN SEVERITY QUANTIFIED, NO PAIN PRESENT: ICD-10-PCS | Mod: CPTII,S$GLB,, | Performed by: INTERNAL MEDICINE

## 2022-11-03 PROCEDURE — 1126F AMNT PAIN NOTED NONE PRSNT: CPT | Mod: CPTII,S$GLB,, | Performed by: INTERNAL MEDICINE

## 2022-11-03 PROCEDURE — 3078F DIAST BP <80 MM HG: CPT | Mod: CPTII,S$GLB,, | Performed by: INTERNAL MEDICINE

## 2022-11-03 PROCEDURE — 3288F FALL RISK ASSESSMENT DOCD: CPT | Mod: CPTII,S$GLB,, | Performed by: INTERNAL MEDICINE

## 2022-11-03 PROCEDURE — 99999 PR PBB SHADOW E&M-EST. PATIENT-LVL III: CPT | Mod: PBBFAC,,, | Performed by: INTERNAL MEDICINE

## 2022-11-03 RX ORDER — AZELASTINE 1 MG/ML
1 SPRAY, METERED NASAL 2 TIMES DAILY
Qty: 30 ML | Refills: 3 | Status: SHIPPED | OUTPATIENT
Start: 2022-11-03 | End: 2023-11-09 | Stop reason: SDUPTHER

## 2022-11-03 NOTE — PROGRESS NOTES
ESTABLISHED PATIENT VISIT    Jasmin Alfaro  is a pleasant 77 y.o. female  with PMH significant for HTN, pre DM who presented early 2022 for evaluation of snoring.     Here today for CPAP follow-up    PLAN last visit:   testing      Since last visit:   HST 6.3.22: AHI 22, RDI 28, <90% x 18%    Received CPAP  Trouble with sinus congestion      PAP history   Problems    Mask Nasal    Pressure Feels ok now   DME HME   Machine age 2022   Download 11.1.22: 14/30 x 5h 52m, 5-12 (8/10/10.8), Leak 11/27/23, AHI 1.2         SLEEP SCHEDULE   Bed Time 10:30-11P   Sleep Latency Not long   Arousals Frequently, often up at 2/ 3AM   Nocturia 2-3   Back to sleep Can take awhile   Wake time 8:30A   Naps none   Work Works at a [employer]       Past Medical History:   Diagnosis Date    Allergic rhinitis     Anxiety     Colon polyps     COVID-19 11/25/2020    Hyperlipidemia     Hypertension     Multiple thyroid nodules      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 , Disp: , Rfl:     atenoloL (TENORMIN) 50 MG tablet, Take 1 tablet (50 mg total) by mouth once daily., Disp: 90 tablet, Rfl: 1    biotin 1 mg Cap, Take 1,000 mcg by mouth once daily. , Disp: , Rfl:     diclofenac sodium (VOLTAREN) 1 % Gel, Apply 2 g topically 4 (four) times daily., Disp:  "1 Tube, Rfl: 3    EPINEPHrine (EPIPEN 2-YAA) 0.3 mg/0.3 mL AtIn, Inject 0.3 mLs (0.3 mg total) into the muscle as needed., Disp: 1 Device, Rfl: 0    famotidine (PEPCID) 40 MG tablet, Take 1 tablet (40 mg total) by mouth once daily., Disp: 90 tablet, Rfl: 0    fluticasone propionate (FLONASE) 50 mcg/actuation nasal spray, 2 sprays (100 mcg total) by Each Nostril route once daily., Disp: 48 g, Rfl: 3    gabapentin (NEURONTIN) 100 MG capsule, Take 1 capsule (100 mg total) by mouth 3 (three) times daily., Disp: 90 capsule, Rfl: 2    hydroCHLOROthiazide (HYDRODIURIL) 25 MG tablet, Take 1 tablet (25 mg total) by mouth once daily., Disp: 90 tablet, Rfl: 1    loratadine (CLARITIN) 10 mg tablet, Take 10 mg by mouth daily as needed. , Disp: , Rfl:     multivit with minerals/lutein (MULTIVITAMIN 50 PLUS ORAL), Take by mouth once daily. , Disp: , Rfl:     neomycin-polymyxin-dexamethasone (DEXACINE) 3.5 mg/g-10,000 unit/g-0.1 % Oint, 1/2 INCH LEFT EYE TWICE A DAY FOR 10 14 DAYS, Disp: , Rfl:     rosuvastatin (CRESTOR) 5 MG tablet, Take 1 tablet (5 mg total) by mouth once daily., Disp: 90 tablet, Rfl: 1    triamcinolone acetonide 0.1% (KENALOG) 0.1 % cream, Apply topically 2 (two) times daily., Disp: 80 g, Rfl: 0     Vitals:    11/03/22 0850   BP: 139/67   Pulse: 60   Weight: 80 kg (176 lb 5.9 oz)   Height: 5' 7" (1.702 m)     Physical Exam:    GEN:   Well-appearing  Psych:  Appropriate affect, demonstrates insight  SKIN:  No rash on the face or bridge of the nose      LABS:   Lab Results   Component Value Date    CO2 25 08/03/2022       RECORDS REVIEWED PREVIOUSLY:    No prior sleep testing.    ASSESSMENT    No flowsheet data found.  PROBLEM DESCRIPTION/ Sx on Presentation  STATUS   NIK   + loud snoring, + snoring arousals, no gasping arousals no recent bed partner  HEENT: MP1, + oropharynx shallow in A-P dimension    New   Daytime Sx   occasional sleepiness when inactive in the afternoon  denies sleepiness when driving   ESS " 5/24 on intake Feels better if she gets 7 hours with it Some improvement   Insomnia   Trouble falling asleep, does word puzzles, reading  Prior hypnotics:        Current hypnotics:    Waking up less frequently when using improved   Nocturia   x 1-3 per sleep period About 2 times per night unchanged   Other issues:     PLAN     -trial of astelin  -adjust CPAP pressures to 10-14, set ramp 5 x auto  -the patient is using and benefiting from PAP therapy    RTC in 3 months         The patient was given open opportunity to ask questions and/or express concerns about treatment plan.   All questions/concerns were discussed.     Two patient identifiers used prior to evaluation.

## 2022-12-10 ENCOUNTER — HOSPITAL ENCOUNTER (EMERGENCY)
Facility: OTHER | Age: 77
Discharge: HOME | End: 2022-12-10
Attending: EMERGENCY MEDICINE
Payer: MEDICARE

## 2022-12-10 VITALS
DIASTOLIC BLOOD PRESSURE: 77 MMHG | HEART RATE: 75 BPM | WEIGHT: 175 LBS | SYSTOLIC BLOOD PRESSURE: 164 MMHG | BODY MASS INDEX: 32.2 KG/M2 | OXYGEN SATURATION: 97 % | HEIGHT: 62 IN | TEMPERATURE: 100 F | RESPIRATION RATE: 17 BRPM
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
## 2022-12-10 DIAGNOSIS — S01.81XA FACIAL LACERATION, INITIAL ENCOUNTER: Primary | ICD-10-CM

## 2022-12-10 DIAGNOSIS — S63.501A WRIST SPRAIN, RIGHT, INITIAL ENCOUNTER: ICD-10-CM

## 2022-12-10 PROCEDURE — 99283 EMERGENCY DEPT VISIT LOW MDM: CPT | Mod: 25,HCNC

## 2022-12-10 PROCEDURE — 12011 RPR F/E/E/N/L/M 2.5 CM/<: CPT | Mod: HCNC

## 2022-12-10 RX ORDER — ACETAMINOPHEN 500 MG
1000 TABLET ORAL
Status: DISCONTINUED | OUTPATIENT
Start: 2022-12-10 | End: 2022-12-10

## 2022-12-10 RX ORDER — NAPROXEN 500 MG/1
500 TABLET ORAL EVERY 12 HOURS PRN
Qty: 14 TABLET | Refills: 0 | Status: SHIPPED | OUTPATIENT
Start: 2022-12-10 | End: 2022-12-17

## 2022-12-10 NOTE — ED NOTES
Jasmin Alfaro, an 77 y.o. female presents to the ED after mechanical trip and fall. Lac to R eyebrow.  +blood thinners      Chief Complaint   Patient presents with    Facial Laceration     C/o LAC to right eyebrow and pain to RUE s/p trip and fall 20 min PTA. Stated on ASA 81 mg daily. Bleeding controlled. 3 cm LAC noted to right eyebrow. -LOC VSS     Review of patient's allergies indicates:   Allergen Reactions    Meperidine Hives and Other (See Comments)     Unknown reaction       Past Medical History:   Diagnosis Date    Allergic rhinitis     Anxiety     Colon polyps     COVID-19 11/25/2020    Hyperlipidemia     Hypertension     Multiple thyroid nodules

## 2022-12-10 NOTE — ED PROVIDER NOTES
Source of History:  Patient     Chief complaint:  Facial Laceration (C/o LAC to right eyebrow and pain to RUE s/p trip and fall 20 min PTA. Stated on ASA 81 mg daily. Bleeding controlled. 3 cm LAC noted to right eyebrow. -LOC VSS)      HPI:  Jasmin Alfaro is a 77 y.o. female who is presenting to the emergency department with facial laceration and right wrist pain after a mechanical fall which occurred 20 minutes prior to arrival.  She states she tripped on the sidewalk, bracing her fall with her right hand but struck her right eyebrow.  She did not lose consciousness.  She is reporting facial pain only near the wound site.  She is up-to-date on tetanus.  No headache, vision changes, neck pain, nausea or vomiting.  She takes a baby aspirin daily.  This is the extent to the patients complaints today here in the emergency department.    ROS: As per HPI and below:  General: No fever.  No chills.  No fatigue.  Eyes: No visual changes.  ENT: No sore throat. No congestion.  Head: No headache.    Respiratory: No shortness of breath.  No cough.  Cardiovascular: No chest pain.  Abdomen: No abdominal pain.  No nausea or vomiting.  Neurologic: No focal weakness.  No numbness.  MSK: + right wrist and forearm pain  Integument: + facial laceration   Allergy/immunology:  Not immunocompromised.     Review of patient's allergies indicates:   Allergen Reactions    Meperidine Hives and Other (See Comments)     Unknown reaction         PMH:  As per HPI and below:  Past Medical History:   Diagnosis Date    Allergic rhinitis     Anxiety     Colon polyps     COVID-19 11/25/2020    Hyperlipidemia     Hypertension     Multiple thyroid nodules      Past Surgical History:   Procedure Laterality Date    APPENDECTOMY      BIOPSY OF THYROID      HYSTERECTOMY      INCISIONAL HERNIA REPAIR      LAPAROTOMY         Social History     Tobacco Use    Smoking status: Never    Smokeless tobacco: Never   Substance Use Topics    Alcohol use:  "Not Currently    Drug use: Never       Physical Exam:    BP (!) 141/68 (BP Location: Left arm, Patient Position: Sitting)   Pulse 71   Temp 99.6 °F (37.6 °C) (Oral)   Resp 17   Ht 5' 2" (1.575 m)   Wt 79.4 kg (175 lb)   SpO2 97%   BMI 32.01 kg/m²   Nursing note and vital signs reviewed.  Appearance: No acute distress.  Eyes: No conjunctival injection.  PERRLA.  Normal EOM.  HENT: Oropharynx clear.  No facial deformity.  Mild soft tissue tenderness superior right orbital rim.  No malocclusion.  Chest/ Respiratory: Clear to auscultation bilaterally.  Good air movement.  No wheezes.  No rhonchi. No rales. No accessory muscle use.  Cardiovascular: Regular rate and rhythm.  No murmurs. No gallops. No rubs.  Abdomen: Soft.  Not distended.  Nontender.  No guarding.  No rebound. Non-peritoneal.  Musculoskeletal:  Right wrist:  No obvious deformity.  Tenderness to distal radius and ulna.  Normal range of motion.  Soft tissue tenderness along radial shaft.  All other joints with normal range of motion.  No deformities.  Neck supple.  No meningismus.  No spinal midline tenderness.  Skin: 2 cm gaping, bleeding laceration above the right eyebrow.  Neurologic: Motor intact.  Sensation intact.  Cerebellar intact.  Mental Status:  Alert and oriented x 3.  Appropriate, conversant.   Labs that have been ordered have been independently reviewed and interpreted by myself.    Lac Repair    Date/Time: 12/10/2022 5:17 PM  Performed by: Servando Vivar PA-C  Authorized by: Michael Dorman MD     Consent:     Consent obtained:  Verbal  Laceration details:     Location:  Face    Face location:  R eyebrow    Length (cm):  2  Pre-procedure details:     Preparation:  Patient was prepped and draped in usual sterile fashion  Exploration:     Hemostasis achieved with:  Direct pressure and epinephrine    Imaging outcome: foreign body not noted      Wound extent: no nerve damage noted    Treatment:     Area cleansed with:  Saline    " Amount of cleaning:  Standard    Irrigation method:  Pressure wash  Skin repair:     Repair method:  Sutures    Suture size:  5-0    Suture material:  Prolene    Suture technique:  Simple interrupted    Number of sutures:  5  Approximation:     Approximation:  Close  Repair type:     Repair type:  Simple  Post-procedure details:     Dressing:  Open (no dressing)    Procedure completion:  Tolerated well, no immediate complications     I decided to obtain the patient's medical records.    MDM/ Differential Dx:    77 y.o. female who is presenting to the emergency department with a facial laceration and right wrist pain after mechanical fall which occurred prior to arrival.  No signs on exam to suggest intracranial hemorrhage or skull fracture.  X-ray of right wrist and forearm without dislocation or fracture.  Wound was repaired as described in procedure note.  Patient given wound care instructions and wrist sprain instructions.           Diagnostic Impression:    1. Facial laceration, initial encounter    2. Fall    3. Wrist sprain, right, initial encounter                   Servando Vivar PA-C  12/10/22 [id number]

## 2022-12-19 ENCOUNTER — OFFICE VISIT (OUTPATIENT)
Dept: URGENT CARE | Facility: CLINIC | Age: 77
End: 2022-12-19
Payer: MEDICARE

## 2022-12-19 VITALS
OXYGEN SATURATION: 100 % | HEART RATE: 68 BPM | TEMPERATURE: 98 F | BODY MASS INDEX: 32.2 KG/M2 | DIASTOLIC BLOOD PRESSURE: 84 MMHG | HEIGHT: 62 IN | WEIGHT: 175 LBS | RESPIRATION RATE: 16 BRPM | SYSTOLIC BLOOD PRESSURE: 162 MMHG

## 2022-12-19 DIAGNOSIS — Z48.02 ENCOUNTER FOR REMOVAL OF SUTURES: Primary | ICD-10-CM

## 2022-12-19 DIAGNOSIS — I10 PRIMARY HYPERTENSION: ICD-10-CM

## 2022-12-19 PROCEDURE — 1101F PT FALLS ASSESS-DOCD LE1/YR: CPT | Mod: CPTII,S$GLB,, | Performed by: FAMILY MEDICINE

## 2022-12-19 PROCEDURE — 3077F SYST BP >= 140 MM HG: CPT | Mod: CPTII,S$GLB,, | Performed by: FAMILY MEDICINE

## 2022-12-19 PROCEDURE — 3077F PR MOST RECENT SYSTOLIC BLOOD PRESSURE >= 140 MM HG: ICD-10-PCS | Mod: CPTII,S$GLB,, | Performed by: FAMILY MEDICINE

## 2022-12-19 PROCEDURE — 1160F PR REVIEW ALL MEDS BY PRESCRIBER/CLIN PHARMACIST DOCUMENTED: ICD-10-PCS | Mod: CPTII,S$GLB,, | Performed by: FAMILY MEDICINE

## 2022-12-19 PROCEDURE — 1159F PR MEDICATION LIST DOCUMENTED IN MEDICAL RECORD: ICD-10-PCS | Mod: CPTII,S$GLB,, | Performed by: FAMILY MEDICINE

## 2022-12-19 PROCEDURE — 3079F PR MOST RECENT DIASTOLIC BLOOD PRESSURE 80-89 MM HG: ICD-10-PCS | Mod: CPTII,S$GLB,, | Performed by: FAMILY MEDICINE

## 2022-12-19 PROCEDURE — 3288F FALL RISK ASSESSMENT DOCD: CPT | Mod: CPTII,S$GLB,, | Performed by: FAMILY MEDICINE

## 2022-12-19 PROCEDURE — 1101F PR PT FALLS ASSESS DOC 0-1 FALLS W/OUT INJ PAST YR: ICD-10-PCS | Mod: CPTII,S$GLB,, | Performed by: FAMILY MEDICINE

## 2022-12-19 PROCEDURE — 99213 OFFICE O/P EST LOW 20 MIN: CPT | Mod: S$GLB,,, | Performed by: FAMILY MEDICINE

## 2022-12-19 PROCEDURE — 99024 SUTURE REMOVAL: ICD-10-PCS | Mod: S$GLB,,, | Performed by: FAMILY MEDICINE

## 2022-12-19 PROCEDURE — 3079F DIAST BP 80-89 MM HG: CPT | Mod: CPTII,S$GLB,, | Performed by: FAMILY MEDICINE

## 2022-12-19 PROCEDURE — 3288F PR FALLS RISK ASSESSMENT DOCUMENTED: ICD-10-PCS | Mod: CPTII,S$GLB,, | Performed by: FAMILY MEDICINE

## 2022-12-19 PROCEDURE — 1159F MED LIST DOCD IN RCRD: CPT | Mod: CPTII,S$GLB,, | Performed by: FAMILY MEDICINE

## 2022-12-19 PROCEDURE — 99213 PR OFFICE/OUTPT VISIT, EST, LEVL III, 20-29 MIN: ICD-10-PCS | Mod: S$GLB,,, | Performed by: FAMILY MEDICINE

## 2022-12-19 PROCEDURE — 99024 POSTOP FOLLOW-UP VISIT: CPT | Mod: S$GLB,,, | Performed by: FAMILY MEDICINE

## 2022-12-19 PROCEDURE — 1160F RVW MEDS BY RX/DR IN RCRD: CPT | Mod: CPTII,S$GLB,, | Performed by: FAMILY MEDICINE

## 2022-12-19 NOTE — PROGRESS NOTES
"Subjective:       Patient ID: Jasmin Alfaro is a 77 y.o. female.    Vitals:  height is 5' 2" (1.575 m) and weight is 79.4 kg (175 lb). Her temperature is 97.9 °F (36.6 °C). Her blood pressure is 162/84 (abnormal) and her pulse is 68. Her respiration is 16 and oxygen saturation is 100%.     Chief Complaint: Suture / Staple Removal    Pt presents today for suture removals states placement at [facility] on 12/10/2022. Sutures above right eyebrow. Denies any pain, drainage, redness, and/ swelling.  Blood pressure is found to be mildly elevated.  Patient is compliant with her blood pressure medicines.  Denies chest pain, SOB, headache, weakness.    Suture / Staple Removal  The sutures were placed 11 to 14 days ago (12/10/22). She tried regular soap and water washings since the wound repair. Her temperature was unmeasured prior to arrival. There has been no drainage from the wound. There is no redness present. There is no swelling present. There is no pain present.     Skin:  Negative for erythema.     Objective:      Physical Exam   Constitutional: She is oriented to person, place, and time. She appears well-developed.   HENT:   Head: Normocephalic. Head is without abrasion, without contusion and without laceration.      Comments:   Right eyebrow:  For Prolene stitches in place.  Wound healing well, no redness, swelling, discharge.  Sutures removed, cleaned and Band-Aid placed.  Ears:   Right Ear: External ear normal.   Left Ear: External ear normal.   Nose: Nose normal.   Mouth/Throat: Oropharynx is clear and moist and mucous membranes are normal.   Eyes: Conjunctivae, EOM and lids are normal. Pupils are equal, round, and reactive to light.   Neck: Trachea normal and phonation normal. Neck supple.   Cardiovascular: Normal rate, regular rhythm and normal heart sounds.   No murmur heard.  Pulmonary/Chest: Effort normal and breath sounds normal. No stridor. No respiratory distress. She has no wheezes. She has no " rhonchi. She has no rales.   Musculoskeletal: Normal range of motion.         General: Normal range of motion.   Neurological: She is alert and oriented to person, place, and time.   Skin: Skin is warm, dry, intact and no rash. Capillary refill takes less than 2 seconds. No abrasion, No burn, No bruising, No erythema and No ecchymosis   Psychiatric: Her speech is normal and behavior is normal. Judgment and thought content normal.   Nursing note and vitals reviewed.      Assessment:       No diagnosis found.      Plan:         There are no diagnoses linked to this encounter.

## 2022-12-19 NOTE — PROCEDURES
Suture Removal    Date/Time: 12/19/2022 10:00 AM  Location procedure was performed: [facility] EMERGENCY DEPARTMENT  Performed by: Hasmukh Jaimes MD  Authorized by: Hasmukh Jaimes MD   Body area: head/neck  Location details: forehead  Wound Appearance: clean and well healed  Sutures Removed: 4  Post-removal: bandaid applied  Complications: No  Estimated blood loss (mL): 0  Specimens: No  Implants: No  Patient tolerance: Patient tolerated the procedure well with no immediate complications

## 2023-01-11 NOTE — PROGRESS NOTES
"[facility] Note    Chief Complaint      Chief Complaint   Patient presents with    Follow-up       History of Present Illness      Jasmin Alfaro is a 77 y.o.  AAF with HTN, HLD, AR, and Anxiety presents to  to fu chronic issues.  Last visit - 7/14/22    Seen in ED 12/20/22 s/p fall - She tripped.  No dizziness. She had a facial lac s/p repair.       Abd Pain - seen by NP 10/19/22 - Abd U/S - 11/3/22- fatty Liver. Rec limit tylenol and alcohol.  Rec diet and exercise for wt loss.  As discussed at visit there is a potential for cirrhosis.      Mod/Severe NIK - Snoring - Fu by sleep clinic. Sleep study - 6/3/22 - Moderately severe Obstructive Sleep Apnea. She is on an APAP - 10-14. Rec compliance with nightly use.  Has fu in Feb.      Cervical Radiculopathy - MRI C-spine - 10/6/21- Spondylosis of the cervical spine, there is no severe canal stenosis. Fu by Ortho. C-spine Xrays - 2/25/22 - Mild degenerative changes as above and minimal instability. Pt on gabapentin 100 mg TID, Tizanidine 4 mg Q6. Pt completed Ptx.       Cisco Heel pain - She c/o Cisco heel pain.  Xray 4/29/21 - Achilles enthesopathy and plantar calcaneal spur noted bilaterally. Gel insert helps. Calf pain improved with stretching. She tries to walk. 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 "It's been a lot better".      Chronic Left Knee pain -She has had this pain "for a couple yrs".  Tylenol - it helps a little but not much. It can be 2-8/10 in severity if she is on her legs too long.   No trauma. No swelling.  No erythema, or warmth.  No h/o Gout.   Cisco Knee Xray - 4/29/21 -  Moderate loss of tibiofemoral cartilage space with osteophyte production.  She has trouble going up stairs.  Her Rt knee gives out/mahad.  She would like a letter so she can get an apartment on the 1st floor in a senior Independent Living facility. She uses Tylenol daily.  She will alert me for any GI S.E.      Prediabetes - HA1c -6.4. Rec low " carb diet.  Cont to monitor.        HTN - Controlled on Atenolol 50 mg/d, Amlodipine 10 mg/d, HCTZ 25 mg QAm.     HLD - Pt controlled on Crestor 5 mg Qother day in Aug.  CK level was slightly high but stable at 247.  Her leg cramps have gotten better since starting magnesium and gabapentin.      AR -Pt on Claritin prn and Flonase daily.      Anxiety - Pt doing well off Lexapro 10 mg/d due to wt gain. Stable off meds.  She just got .      GERD - Pt on Famotidine prn - no longer helping.  Rec Reflux prec. She has been eating later 7-8 PM. Will try Prilosec daily x 2 wks and then wean to prn.      MNG - Pt prev fu by ENT, Dr. Patel.  She then fu for a second opinion at [facility] with Dr. Robyn Mann. She is now fu by Dr. Romero. Thyroid U/S - 8/3/22 - You still have a multinodular goiter. There were 8 nodules all of which appear stable. The largest nodule was 1.4 cm.     Osteopenia - h/o Rt humeral neck fracture.  She has not been able to go the gym due to the pandemic.  She tries to walk. Cont Calcium and Vit d.      H/O PE - '74- She was tx at the time.  Pt no longer on Anticoagulation.      HCM - Flu - none - pt defers;  Tdap - ?< 10 yrs;  PCV 13 - 6/2/20;  PVX 23 - 7/14/21;  Shingrix - none- declines due to finances;  COVID 19 Vaccine #1 - 3/3/21; #2 - 3/26/21; # 3 - 1/10/22; # 4 9/21/22; MGM - 6/27/22 - repeat 1 yr;  DEXA - 11/13/20 - + Osteopenia;  PAP - no longer gets; Hep C Screen - neg - 6/2/20; C-scope - 10/15/20  +Polyps- told to repeat in 3 yrs; GI - Dr. Sanchez;  Prev PCP - me then Dr. Kennedy; Ortho - Dr. Rodrigues; Ophtho - ; Endo - Dr. Romero; Podiatry - Dr. Oconnor      Patient Care Team:  Gloria Smith MD as PCP - General (Internal Medicine)  Izzy Alfaro MA as Care Coordinator     Health Maintenance:  Immunization History   Administered Date(s) Administered    COVID-19 MRNA, LN-S PF (MODERNA HALF 0.25 ML DOSE) 01/10/2022    COVID-19, MRNA, LN-S, PF (Pfizer)  (Purple Cap) 03/03/2021, 03/26/2021    COVID-19, mRNA, LNP-S, bivalent booster, PF (PFIZER OMICRON) 09/21/2022    Pneumococcal Conjugate - 13 Valent 06/02/2020    Pneumococcal Polysaccharide - 23 Valent 07/14/2021      Health Maintenance   Topic Date Due    TETANUS VACCINE  Never done    DEXA Scan  11/13/2023    Lipid Panel  08/03/2027    Hepatitis C Screening  Completed        Past Medical History:  Past Medical History:   Diagnosis Date    Allergic rhinitis     Anxiety     Colon polyps     COVID-19 11/25/2020    Hyperlipidemia     Hypertension     Multiple thyroid nodules        Past Surgical History:   has a past surgical history that includes Hysterectomy; Appendectomy; Incisional hernia repair; Laparotomy; and Biopsy of thyroid.    Family History:  family history includes Cancer in her mother; Colon cancer in her brother; Diabetes type I in her sister; Heart failure in her brother and mother; Hypertension in her brother, father, mother, and sister; Hypothyroidism in her sister; Kidney failure in her brother and sister.     Social History:  Social History     Tobacco Use    Smoking status: Never    Smokeless tobacco: Never   Substance Use Topics    Alcohol use: Not Currently    Drug use: Never       Review of Systems   Constitutional:  Negative for chills, diaphoresis and fever.   HENT:  Negative for hearing loss.    Eyes:  Positive for visual disturbance.   Respiratory:  Negative for chest tightness and shortness of breath.    Cardiovascular:  Negative for chest pain.   Gastrointestinal:  Positive for constipation. Negative for blood in stool, diarrhea, nausea and vomiting.        Rec adeq hydration.  Rec inc fiber in diet.  Rec stool softener Qday-BID and Miralax prn.           Genitourinary:  Negative for dysuria and frequency.   Musculoskeletal:  Negative for arthralgias and myalgias.   Neurological:  Negative for dizziness and headaches.   Psychiatric/Behavioral:  Negative for dysphoric mood. The patient is  not nervous/anxious.       Medications:    Current Outpatient Medications:     aspirin 81 MG Chew, Take 81 mg by mouth once daily. , Disp: , Rfl:     azelastine (ASTELIN) 137 mcg (0.1 %) nasal spray, 1 spray (137 mcg total) by Nasal route 2 (two) times daily., Disp: 30 mL, Rfl: 3    biotin 1 mg Cap, Take 1,000 mcg by mouth once daily. , Disp: , Rfl:     diclofenac sodium (VOLTAREN) 1 % Gel, Apply 2 g topically 4 (four) times daily., Disp: 1 Tube, Rfl: 3    EPINEPHrine (EPIPEN 2-YAA) 0.3 mg/0.3 mL AtIn, Inject 0.3 mLs (0.3 mg total) into the muscle as needed., Disp: 1 Device, Rfl: 0    fluticasone propionate (FLONASE) 50 mcg/actuation nasal spray, 2 sprays (100 mcg total) by Each Nostril route once daily., Disp: 48 g, Rfl: 3    gabapentin (NEURONTIN) 100 MG capsule, TAKE 1 CAPSULE THREE TIMES DAILY, Disp: 90 capsule, Rfl: 2    loratadine (CLARITIN) 10 mg tablet, Take 10 mg by mouth daily as needed. , Disp: , Rfl:     multivit with minerals/lutein (MULTIVITAMIN 50 PLUS ORAL), Take by mouth once daily. , Disp: , Rfl:     amLODIPine (NORVASC) 10 MG tablet, Take 1 tablet (10 mg total) by mouth once daily., Disp: 90 tablet, Rfl: 1    atenoloL (TENORMIN) 50 MG tablet, Take 1 tablet (50 mg total) by mouth once daily., Disp: 90 tablet, Rfl: 1    hydroCHLOROthiazide (HYDRODIURIL) 25 MG tablet, Take 1 tablet (25 mg total) by mouth once daily., Disp: 90 tablet, Rfl: 1    neomycin-polymyxin-dexamethasone (DEXACINE) 3.5 mg/g-10,000 unit/g-0.1 % Oint, 1/2 INCH LEFT EYE TWICE A DAY FOR 10 14 DAYS, Disp: , Rfl:     omeprazole (PRILOSEC) 20 MG capsule, Take 1 capsule (20 mg total) by mouth once daily., Disp: 90 capsule, Rfl: 0    rosuvastatin (CRESTOR) 5 MG tablet, Take 1 tablet (5 mg total) by mouth once daily., Disp: 90 tablet, Rfl: 1     Allergies:  Review of patient's allergies indicates:   Allergen Reactions    Meperidine Hives and Other (See Comments)     Unknown reaction         Physical Exam      Vital Signs  Temp: 98.6 °F  "(37 °C)  Temp src: Oral  Pulse: 85  Resp: 18  SpO2: 98 %  BP: 136/72  BP Location: Right arm  Patient Position: Sitting  Pain Score:   2  Pain Loc: Eye  Height and Weight  Height: 5' 2" (157.5 cm)  Weight: 81.9 kg (180 lb 8.9 oz)  BSA (Calculated - sq m): 1.89 sq meters  BMI (Calculated): 33  Weight in (lb) to have BMI = 25: 136.4      Patient Position: Sitting      Physical Exam  Vitals reviewed.   Constitutional:       General: She is not in acute distress.     Appearance: Normal appearance. She is not ill-appearing, toxic-appearing or diaphoretic.   HENT:      Head: Normocephalic and atraumatic.      Right Ear: Tympanic membrane normal.      Left Ear: Tympanic membrane normal.   Eyes:      Extraocular Movements: Extraocular movements intact.      Conjunctiva/sclera: Conjunctivae normal.      Pupils: Pupils are equal, round, and reactive to light.      Comments: Left cataract   Neck:      Vascular: No carotid bruit.   Cardiovascular:      Rate and Rhythm: Normal rate and regular rhythm.      Pulses: Normal pulses.      Heart sounds: Normal heart sounds.   Pulmonary:      Effort: Pulmonary effort is normal. No respiratory distress.      Breath sounds: Normal breath sounds.   Abdominal:      General: Bowel sounds are normal. There is no distension.      Palpations: Abdomen is soft.      Tenderness: There is no abdominal tenderness. There is no guarding or rebound.   Musculoskeletal:      Cervical back: Neck supple. No tenderness.   Neurological:      General: No focal deficit present.      Mental Status: She is alert and oriented to person, place, and time.   Psychiatric:         Mood and Affect: Mood normal.         Behavior: Behavior normal.        Laboratory:      CMP:  Recent Labs   Lab 06/02/20  1117 07/12/21  0829 07/29/21  0919 01/21/22  0824 08/03/22  1024   Glucose 111 H   < > 106   < > 117 H   Calcium 10.1   < > 10.2   < > 9.7   Albumin 4.5  --  3.9  --  4.1   Total Protein 8.1  --  7.6  --  7.7   Sodium " 136   < > 140   < > 140   Potassium 3.9   < > 3.6   < > 3.6   CO2 28   < > 27   < > 25   Chloride 100   < > 104   < > 105   BUN 16   < > 21   < > 21   Creatinine 1.1   < > 1.2   < > 1.0   Alkaline Phosphatase 80  --  69  --  88   ALT 19  --  20  --  20   AST 28  --  30  --  29   Total Bilirubin 0.5  --  0.6  --  0.6    < > = values in this interval not displayed.       URINALYSIS:  Recent Labs   Lab 03/02/20  1553 05/12/22  1018 10/19/22  1548   Color, UA yellow  --  Colorless A   Specific Gravity, UA  --   --  1.010   Spec Grav UA 1.005  --   --    pH, UA 7   < > 7.0   Protein, UA  --   --  Negative   Nitrite, UA negative  --  Negative   Leukocytes, UA  --   --  Negative   Urobilinogen, UA normal  --   --     < > = values in this interval not displayed.        LIPIDS:  Recent Labs   Lab 06/02/20  1117 10/01/20  0925 07/29/21  0919 01/21/22  0824 08/03/22  1024   TSH  --  1.549  --  2.23  --    HDL 53  --  44  --  44   Cholesterol 149  --  143  --  161   Triglycerides 83  --  82  --  101   LDL Cholesterol 79.4  --  82.6  --  96.8   HDL/Cholesterol Ratio 35.6  --  30.8  --  27.3   Non-HDL Cholesterol 96  --  99  --  117   Total Cholesterol/HDL Ratio 2.8  --  3.3  --  3.7       TSH:  Recent Labs   Lab 10/01/20  0925 01/21/22  0824   TSH 1.549 2.23       A1C:  Recent Labs   Lab 10/01/20  0925 07/29/21  0919 08/03/22  1024   Hemoglobin A1C 6.2 H 6.1 H 6.4 H          Other:   Recent Labs   Lab 10/01/20  0925   Vit D, 25-Hydroxy 37     Recent Labs   Lab 06/02/20  1117   Hepatitis C Ab Negative       Assessment/Plan     Jasmin Alfaro is a 77 y.o.female with:    Hypertension, unspecified type  -     amLODIPine (NORVASC) 10 MG tablet; Take 1 tablet (10 mg total) by mouth once daily.  Dispense: 90 tablet; Refill: 1  -     atenoloL (TENORMIN) 50 MG tablet; Take 1 tablet (50 mg total) by mouth once daily.  Dispense: 90 tablet; Refill: 1  -     hydroCHLOROthiazide (HYDRODIURIL) 25 MG tablet; Take 1 tablet (25 mg total)  by mouth once daily.  Dispense: 90 tablet; Refill: 1  -     Comprehensive Metabolic Panel; Future; Expected date: 02/04/2023  -     CBC Auto Differential; Future; Expected date: 02/04/2023  - Controlled.  Cont current.     Hyperlipidemia, unspecified hyperlipidemia type  -     rosuvastatin (CRESTOR) 5 MG tablet; Take 1 tablet (5 mg total) by mouth once daily.  Dispense: 90 tablet; Refill: 1  -     Comprehensive Metabolic Panel; Future; Expected date: 02/04/2023  -     Lipid Panel; Future; Expected date: 02/04/2023  -  Pt controlled on Crestor 5 mg Qother day in Aug.  CK level was slightly high but stable at 247.  Her leg cramps have gotten better since starting magnesium and gabapentin.     Multiple thyroid nodules  - Stable.  Cont current regimen.    Gastroesophageal reflux disease, unspecified whether esophagitis present  -     omeprazole (PRILOSEC) 20 MG capsule; Take 1 capsule (20 mg total) by mouth once daily.  Dispense: 90 capsule; Refill: 0  - Uncontrolled. Rec reflux prec.  Will Rx Omeprazole.     Prediabetes  -     Hemoglobin A1C; Future; Expected date: 02/04/2023  - Rec low carb diet.     Osteopenia, unspecified location  - Stable.  Cont current regimen.    NIK (obstructive sleep apnea)  - Stable.  Cont current regimen.     Chronic kidney disease, stage 3a  - Stable.  Cont to monitor.     Screening mammogram, encounter for  -     Mammo Digital Screening Bilat w/ Karan; Future; Expected date: 06/27/2023    Chronic conditions status updated as per HPI.  Other than changes above, cont current medications and maintain follow up with specialists.    Follow up in about 6 months (around 7/12/2023) for fu chronic issues or sooner if needed.      Gloria Smith MD  [facility]

## 2023-01-12 ENCOUNTER — OFFICE VISIT (OUTPATIENT)
Dept: PRIMARY CARE CLINIC | Facility: CLINIC | Age: 78
End: 2023-01-12
Payer: MEDICARE

## 2023-01-12 VITALS
SYSTOLIC BLOOD PRESSURE: 136 MMHG | WEIGHT: 180.56 LBS | BODY MASS INDEX: 33.23 KG/M2 | RESPIRATION RATE: 18 BRPM | HEART RATE: 85 BPM | OXYGEN SATURATION: 98 % | HEIGHT: 62 IN | TEMPERATURE: 99 F | DIASTOLIC BLOOD PRESSURE: 72 MMHG

## 2023-01-12 DIAGNOSIS — K21.9 GASTROESOPHAGEAL REFLUX DISEASE, UNSPECIFIED WHETHER ESOPHAGITIS PRESENT: ICD-10-CM

## 2023-01-12 DIAGNOSIS — Z12.31 SCREENING MAMMOGRAM, ENCOUNTER FOR: ICD-10-CM

## 2023-01-12 DIAGNOSIS — G47.33 OSA (OBSTRUCTIVE SLEEP APNEA): ICD-10-CM

## 2023-01-12 DIAGNOSIS — E78.5 HYPERLIPIDEMIA, UNSPECIFIED HYPERLIPIDEMIA TYPE: ICD-10-CM

## 2023-01-12 DIAGNOSIS — M85.80 OSTEOPENIA, UNSPECIFIED LOCATION: ICD-10-CM

## 2023-01-12 DIAGNOSIS — E04.2 MULTIPLE THYROID NODULES: ICD-10-CM

## 2023-01-12 DIAGNOSIS — I10 HYPERTENSION, UNSPECIFIED TYPE: Primary | ICD-10-CM

## 2023-01-12 DIAGNOSIS — R73.03 PREDIABETES: ICD-10-CM

## 2023-01-12 DIAGNOSIS — N18.31 CHRONIC KIDNEY DISEASE, STAGE 3A: ICD-10-CM

## 2023-01-12 PROCEDURE — 99214 OFFICE O/P EST MOD 30 MIN: CPT | Mod: HCNC,S$GLB,, | Performed by: INTERNAL MEDICINE

## 2023-01-12 PROCEDURE — 1125F AMNT PAIN NOTED PAIN PRSNT: CPT | Mod: HCNC,CPTII,S$GLB, | Performed by: INTERNAL MEDICINE

## 2023-01-12 PROCEDURE — 3288F PR FALLS RISK ASSESSMENT DOCUMENTED: ICD-10-PCS | Mod: HCNC,CPTII,S$GLB, | Performed by: INTERNAL MEDICINE

## 2023-01-12 PROCEDURE — 1159F MED LIST DOCD IN RCRD: CPT | Mod: HCNC,CPTII,S$GLB, | Performed by: INTERNAL MEDICINE

## 2023-01-12 PROCEDURE — 99214 PR OFFICE/OUTPT VISIT, EST, LEVL IV, 30-39 MIN: ICD-10-PCS | Mod: HCNC,S$GLB,, | Performed by: INTERNAL MEDICINE

## 2023-01-12 PROCEDURE — 99999 PR PBB SHADOW E&M-EST. PATIENT-LVL IV: CPT | Mod: PBBFAC,HCNC,, | Performed by: INTERNAL MEDICINE

## 2023-01-12 PROCEDURE — 3078F DIAST BP <80 MM HG: CPT | Mod: HCNC,CPTII,S$GLB, | Performed by: INTERNAL MEDICINE

## 2023-01-12 PROCEDURE — 1101F PR PT FALLS ASSESS DOC 0-1 FALLS W/OUT INJ PAST YR: ICD-10-PCS | Mod: HCNC,CPTII,S$GLB, | Performed by: INTERNAL MEDICINE

## 2023-01-12 PROCEDURE — 1101F PT FALLS ASSESS-DOCD LE1/YR: CPT | Mod: HCNC,CPTII,S$GLB, | Performed by: INTERNAL MEDICINE

## 2023-01-12 PROCEDURE — 1125F PR PAIN SEVERITY QUANTIFIED, PAIN PRESENT: ICD-10-PCS | Mod: HCNC,CPTII,S$GLB, | Performed by: INTERNAL MEDICINE

## 2023-01-12 PROCEDURE — 3078F PR MOST RECENT DIASTOLIC BLOOD PRESSURE < 80 MM HG: ICD-10-PCS | Mod: HCNC,CPTII,S$GLB, | Performed by: INTERNAL MEDICINE

## 2023-01-12 PROCEDURE — 1159F PR MEDICATION LIST DOCUMENTED IN MEDICAL RECORD: ICD-10-PCS | Mod: HCNC,CPTII,S$GLB, | Performed by: INTERNAL MEDICINE

## 2023-01-12 PROCEDURE — 99999 PR PBB SHADOW E&M-EST. PATIENT-LVL IV: ICD-10-PCS | Mod: PBBFAC,HCNC,, | Performed by: INTERNAL MEDICINE

## 2023-01-12 PROCEDURE — 3075F PR MOST RECENT SYSTOLIC BLOOD PRESS GE 130-139MM HG: ICD-10-PCS | Mod: HCNC,CPTII,S$GLB, | Performed by: INTERNAL MEDICINE

## 2023-01-12 PROCEDURE — 3288F FALL RISK ASSESSMENT DOCD: CPT | Mod: HCNC,CPTII,S$GLB, | Performed by: INTERNAL MEDICINE

## 2023-01-12 PROCEDURE — 3075F SYST BP GE 130 - 139MM HG: CPT | Mod: HCNC,CPTII,S$GLB, | Performed by: INTERNAL MEDICINE

## 2023-01-12 RX ORDER — ATENOLOL 50 MG/1
50 TABLET ORAL DAILY
Qty: 90 TABLET | Refills: 1 | Status: SHIPPED | OUTPATIENT
Start: 2023-01-12 | End: 2023-07-06 | Stop reason: SDUPTHER

## 2023-01-12 RX ORDER — AMLODIPINE BESYLATE 10 MG/1
10 TABLET ORAL DAILY
Qty: 90 TABLET | Refills: 1 | Status: SHIPPED | OUTPATIENT
Start: 2023-01-12 | End: 2023-07-06 | Stop reason: SDUPTHER

## 2023-01-12 RX ORDER — ROSUVASTATIN CALCIUM 5 MG/1
5 TABLET, COATED ORAL DAILY
Qty: 90 TABLET | Refills: 1 | Status: SHIPPED | OUTPATIENT
Start: 2023-01-12 | End: 2023-10-24

## 2023-01-12 RX ORDER — OMEPRAZOLE 20 MG/1
20 CAPSULE, DELAYED RELEASE ORAL DAILY
Qty: 90 CAPSULE | Refills: 0 | Status: SHIPPED | OUTPATIENT
Start: 2023-01-12 | End: 2023-07-06 | Stop reason: SDUPTHER

## 2023-01-12 RX ORDER — HYDROCHLOROTHIAZIDE 25 MG/1
25 TABLET ORAL DAILY
Qty: 90 TABLET | Refills: 1 | Status: SHIPPED | OUTPATIENT
Start: 2023-01-12 | End: 2023-07-06 | Stop reason: SDUPTHER

## 2023-02-06 ENCOUNTER — LAB VISIT (OUTPATIENT)
Dept: LAB | Facility: HOSPITAL | Age: 78
End: 2023-02-06
Attending: INTERNAL MEDICINE
Payer: MEDICARE

## 2023-02-06 DIAGNOSIS — E78.5 HYPERLIPIDEMIA, UNSPECIFIED HYPERLIPIDEMIA TYPE: ICD-10-CM

## 2023-02-06 DIAGNOSIS — R73.03 PREDIABETES: ICD-10-CM

## 2023-02-06 DIAGNOSIS — I10 HYPERTENSION, UNSPECIFIED TYPE: ICD-10-CM

## 2023-02-06 LAB
ALBUMIN SERPL BCP-MCNC: 3.8 G/DL (ref 3.5–5.2)
ALP SERPL-CCNC: 83 U/L (ref 55–135)
ALT SERPL W/O P-5'-P-CCNC: 17 U/L (ref 10–44)
ANION GAP SERPL CALC-SCNC: 12 MMOL/L (ref 8–16)
AST SERPL-CCNC: 24 U/L (ref 10–40)
BASOPHILS # BLD AUTO: 0.05 K/UL (ref 0–0.2)
BASOPHILS NFR BLD: 1.2 % (ref 0–1.9)
BILIRUB SERPL-MCNC: 0.4 MG/DL (ref 0.1–1)
BUN SERPL-MCNC: 16 MG/DL (ref 8–23)
CALCIUM SERPL-MCNC: 9.7 MG/DL (ref 8.7–10.5)
CHLORIDE SERPL-SCNC: 101 MMOL/L (ref 95–110)
CHOLEST SERPL-MCNC: 143 MG/DL (ref 120–199)
CHOLEST/HDLC SERPL: 3.5 {RATIO} (ref 2–5)
CO2 SERPL-SCNC: 24 MMOL/L (ref 23–29)
CREAT SERPL-MCNC: 0.9 MG/DL (ref 0.5–1.4)
DIFFERENTIAL METHOD: ABNORMAL
EOSINOPHIL # BLD AUTO: 0.2 K/UL (ref 0–0.5)
EOSINOPHIL NFR BLD: 3.9 % (ref 0–8)
ERYTHROCYTE [DISTWIDTH] IN BLOOD BY AUTOMATED COUNT: 13.6 % (ref 11.5–14.5)
EST. GFR  (NO RACE VARIABLE): >60 ML/MIN/1.73 M^2
ESTIMATED AVG GLUCOSE: 137 MG/DL (ref 68–131)
GLUCOSE SERPL-MCNC: 112 MG/DL (ref 70–110)
HBA1C MFR BLD: 6.4 % (ref 4–5.6)
HCT VFR BLD AUTO: 41.1 % (ref 37–48.5)
HDLC SERPL-MCNC: 41 MG/DL (ref 40–75)
HDLC SERPL: 28.7 % (ref 20–50)
HGB BLD-MCNC: 13.9 G/DL (ref 12–16)
IMM GRANULOCYTES # BLD AUTO: 0.03 K/UL (ref 0–0.04)
IMM GRANULOCYTES NFR BLD AUTO: 0.7 % (ref 0–0.5)
LDLC SERPL CALC-MCNC: 85.6 MG/DL (ref 63–159)
LYMPHOCYTES # BLD AUTO: 1.4 K/UL (ref 1–4.8)
LYMPHOCYTES NFR BLD: 35.2 % (ref 18–48)
MCH RBC QN AUTO: 31 PG (ref 27–31)
MCHC RBC AUTO-ENTMCNC: 33.8 G/DL (ref 32–36)
MCV RBC AUTO: 92 FL (ref 82–98)
MONOCYTES # BLD AUTO: 0.4 K/UL (ref 0.3–1)
MONOCYTES NFR BLD: 10.8 % (ref 4–15)
NEUTROPHILS # BLD AUTO: 2 K/UL (ref 1.8–7.7)
NEUTROPHILS NFR BLD: 48.2 % (ref 38–73)
NONHDLC SERPL-MCNC: 102 MG/DL
NRBC BLD-RTO: 0 /100 WBC
PLATELET # BLD AUTO: 159 K/UL (ref 150–450)
PMV BLD AUTO: 11.7 FL (ref 9.2–12.9)
POTASSIUM SERPL-SCNC: 3.8 MMOL/L (ref 3.5–5.1)
PROT SERPL-MCNC: 7.4 G/DL (ref 6–8.4)
RBC # BLD AUTO: 4.48 M/UL (ref 4–5.4)
SODIUM SERPL-SCNC: 137 MMOL/L (ref 136–145)
TRIGL SERPL-MCNC: 82 MG/DL (ref 30–150)
WBC # BLD AUTO: 4.09 K/UL (ref 3.9–12.7)

## 2023-02-06 PROCEDURE — 80061 LIPID PANEL: CPT | Mod: HCNC | Performed by: INTERNAL MEDICINE

## 2023-02-06 PROCEDURE — 36415 COLL VENOUS BLD VENIPUNCTURE: CPT | Mod: HCNC | Performed by: INTERNAL MEDICINE

## 2023-02-06 PROCEDURE — 83036 HEMOGLOBIN GLYCOSYLATED A1C: CPT | Mod: HCNC | Performed by: INTERNAL MEDICINE

## 2023-02-06 PROCEDURE — 80053 COMPREHEN METABOLIC PANEL: CPT | Mod: HCNC | Performed by: INTERNAL MEDICINE

## 2023-02-06 PROCEDURE — 85025 COMPLETE CBC W/AUTO DIFF WBC: CPT | Mod: HCNC | Performed by: INTERNAL MEDICINE

## 2023-02-08 NOTE — PROGRESS NOTES
I sent pt a my chart message -  I reviewed your  labs.  Your Ha1c was stable at 6.4. Your Cholesterol looked good.  Your kidney function and liver functions looked good.  You are not anemic. No further recommendations at this time.    Dr. GALVAN

## 2023-02-09 ENCOUNTER — TELEPHONE (OUTPATIENT)
Dept: SLEEP MEDICINE | Facility: CLINIC | Age: 78
End: 2023-02-09
Payer: MEDICARE

## 2023-02-09 DIAGNOSIS — Z00.00 ENCOUNTER FOR MEDICARE ANNUAL WELLNESS EXAM: ICD-10-CM

## 2023-02-17 ENCOUNTER — OFFICE VISIT (OUTPATIENT)
Dept: OPHTHALMOLOGY | Facility: CLINIC | Age: 78
End: 2023-02-17
Payer: MEDICARE

## 2023-02-17 DIAGNOSIS — Z98.41 STATUS POST CATARACT EXTRACTION AND INSERTION OF INTRAOCULAR LENS, RIGHT: ICD-10-CM

## 2023-02-17 DIAGNOSIS — Z96.1 STATUS POST CATARACT EXTRACTION AND INSERTION OF INTRAOCULAR LENS, RIGHT: ICD-10-CM

## 2023-02-17 DIAGNOSIS — H52.7 REFRACTIVE ERRORS: ICD-10-CM

## 2023-02-17 DIAGNOSIS — H25.092 INCIPIENT SENILE CATARACT, LEFT: Primary | ICD-10-CM

## 2023-02-17 PROCEDURE — 1126F AMNT PAIN NOTED NONE PRSNT: CPT | Mod: HCNC,CPTII,S$GLB, | Performed by: OPHTHALMOLOGY

## 2023-02-17 PROCEDURE — 99204 OFFICE O/P NEW MOD 45 MIN: CPT | Mod: HCNC,S$GLB,, | Performed by: OPHTHALMOLOGY

## 2023-02-17 PROCEDURE — 99204 PR OFFICE/OUTPT VISIT, NEW, LEVL IV, 45-59 MIN: ICD-10-PCS | Mod: HCNC,S$GLB,, | Performed by: OPHTHALMOLOGY

## 2023-02-17 PROCEDURE — 1101F PR PT FALLS ASSESS DOC 0-1 FALLS W/OUT INJ PAST YR: ICD-10-PCS | Mod: HCNC,CPTII,S$GLB, | Performed by: OPHTHALMOLOGY

## 2023-02-17 PROCEDURE — 1101F PT FALLS ASSESS-DOCD LE1/YR: CPT | Mod: HCNC,CPTII,S$GLB, | Performed by: OPHTHALMOLOGY

## 2023-02-17 PROCEDURE — 3288F PR FALLS RISK ASSESSMENT DOCUMENTED: ICD-10-PCS | Mod: HCNC,CPTII,S$GLB, | Performed by: OPHTHALMOLOGY

## 2023-02-17 PROCEDURE — 1126F PR PAIN SEVERITY QUANTIFIED, NO PAIN PRESENT: ICD-10-PCS | Mod: HCNC,CPTII,S$GLB, | Performed by: OPHTHALMOLOGY

## 2023-02-17 PROCEDURE — 92015 PR REFRACTION: ICD-10-PCS | Mod: HCNC,S$GLB,, | Performed by: OPHTHALMOLOGY

## 2023-02-17 PROCEDURE — 92015 DETERMINE REFRACTIVE STATE: CPT | Mod: HCNC,S$GLB,, | Performed by: OPHTHALMOLOGY

## 2023-02-17 PROCEDURE — 3288F FALL RISK ASSESSMENT DOCD: CPT | Mod: HCNC,CPTII,S$GLB, | Performed by: OPHTHALMOLOGY

## 2023-02-17 NOTE — PROGRESS NOTES
Assessment /Plan     For exam results, see Encounter Report.    Incipient senile cataract, left  - does not BAT down yet, observe for now    Status post cataract extraction and insertion of intraocular lens, right  - doing well    Refractive errors     RE: Mrx = Rx    F/up 1 yr sooner prn cat eval.

## 2023-02-24 ENCOUNTER — TELEPHONE (OUTPATIENT)
Dept: PRIMARY CARE CLINIC | Facility: CLINIC | Age: 78
End: 2023-02-24

## 2023-02-24 DIAGNOSIS — R15.9 INCONTINENCE OF FECES, UNSPECIFIED FECAL INCONTINENCE TYPE: Primary | ICD-10-CM

## 2023-02-24 NOTE — TELEPHONE ENCOUNTER
"Pt advised that she has been experiencing stool "leaking/oozing out" when passing gas or periodically through the day. She noted thqat she thinks she has hemorrhoids as well, but denies any other symptoms. No diarrhea or pain. Referral pended.    Pt advised that she scheduled with Dr. Sanchez (who she has previously seen), but was told they do not accept [facility] insurance.   "

## 2023-02-24 NOTE — TELEPHONE ENCOUNTER
----- Message from Sweta Smith sent at 2/24/2023  8:47 AM CST -----  Contact: [phone]  Pt is requesting a referral to see a Gastroenterologist. Per pt, her stool seems to be oozing out more frequently. Please call.           Thank you

## 2023-02-28 ENCOUNTER — TELEPHONE (OUTPATIENT)
Dept: SURGERY | Facility: CLINIC | Age: 78
End: 2023-02-28
Payer: MEDICARE

## 2023-03-01 ENCOUNTER — TELEPHONE (OUTPATIENT)
Dept: ENDOSCOPY | Facility: HOSPITAL | Age: 78
End: 2023-03-01
Payer: MEDICARE

## 2023-03-01 ENCOUNTER — OFFICE VISIT (OUTPATIENT)
Dept: SURGERY | Facility: CLINIC | Age: 78
End: 2023-03-01
Payer: MEDICARE

## 2023-03-01 VITALS
DIASTOLIC BLOOD PRESSURE: 78 MMHG | HEIGHT: 62 IN | BODY MASS INDEX: 33.6 KG/M2 | HEART RATE: 68 BPM | WEIGHT: 182.56 LBS | SYSTOLIC BLOOD PRESSURE: 133 MMHG

## 2023-03-01 DIAGNOSIS — R19.8 RECTAL PRESSURE: ICD-10-CM

## 2023-03-01 DIAGNOSIS — R15.9 INCONTINENCE OF FECES, UNSPECIFIED FECAL INCONTINENCE TYPE: Primary | ICD-10-CM

## 2023-03-01 DIAGNOSIS — R19.8 RECTAL PRESSURE: Primary | ICD-10-CM

## 2023-03-01 DIAGNOSIS — Z80.0 FAMILY HISTORY OF COLON CANCER: ICD-10-CM

## 2023-03-01 DIAGNOSIS — R15.9 INCONTINENCE OF FECES, UNSPECIFIED FECAL INCONTINENCE TYPE: ICD-10-CM

## 2023-03-01 DIAGNOSIS — Z80.0 FH: COLON CANCER: ICD-10-CM

## 2023-03-01 DIAGNOSIS — Z12.11 COLON CANCER SCREENING: ICD-10-CM

## 2023-03-01 PROCEDURE — 1125F AMNT PAIN NOTED PAIN PRSNT: CPT | Mod: HCNC,CPTII,S$GLB, | Performed by: NURSE PRACTITIONER

## 2023-03-01 PROCEDURE — 3078F PR MOST RECENT DIASTOLIC BLOOD PRESSURE < 80 MM HG: ICD-10-PCS | Mod: HCNC,CPTII,S$GLB, | Performed by: NURSE PRACTITIONER

## 2023-03-01 PROCEDURE — 1101F PR PT FALLS ASSESS DOC 0-1 FALLS W/OUT INJ PAST YR: ICD-10-PCS | Mod: HCNC,CPTII,S$GLB, | Performed by: NURSE PRACTITIONER

## 2023-03-01 PROCEDURE — 99999 PR PBB SHADOW E&M-EST. PATIENT-LVL V: ICD-10-PCS | Mod: PBBFAC,HCNC,, | Performed by: NURSE PRACTITIONER

## 2023-03-01 PROCEDURE — 3078F DIAST BP <80 MM HG: CPT | Mod: HCNC,CPTII,S$GLB, | Performed by: NURSE PRACTITIONER

## 2023-03-01 PROCEDURE — 3075F PR MOST RECENT SYSTOLIC BLOOD PRESS GE 130-139MM HG: ICD-10-PCS | Mod: HCNC,CPTII,S$GLB, | Performed by: NURSE PRACTITIONER

## 2023-03-01 PROCEDURE — 1159F MED LIST DOCD IN RCRD: CPT | Mod: HCNC,CPTII,S$GLB, | Performed by: NURSE PRACTITIONER

## 2023-03-01 PROCEDURE — 99204 OFFICE O/P NEW MOD 45 MIN: CPT | Mod: HCNC,S$GLB,, | Performed by: NURSE PRACTITIONER

## 2023-03-01 PROCEDURE — 99999 PR PBB SHADOW E&M-EST. PATIENT-LVL V: CPT | Mod: PBBFAC,HCNC,, | Performed by: NURSE PRACTITIONER

## 2023-03-01 PROCEDURE — 3288F FALL RISK ASSESSMENT DOCD: CPT | Mod: HCNC,CPTII,S$GLB, | Performed by: NURSE PRACTITIONER

## 2023-03-01 PROCEDURE — 3075F SYST BP GE 130 - 139MM HG: CPT | Mod: HCNC,CPTII,S$GLB, | Performed by: NURSE PRACTITIONER

## 2023-03-01 PROCEDURE — 99204 PR OFFICE/OUTPT VISIT, NEW, LEVL IV, 45-59 MIN: ICD-10-PCS | Mod: HCNC,S$GLB,, | Performed by: NURSE PRACTITIONER

## 2023-03-01 PROCEDURE — 1159F PR MEDICATION LIST DOCUMENTED IN MEDICAL RECORD: ICD-10-PCS | Mod: HCNC,CPTII,S$GLB, | Performed by: NURSE PRACTITIONER

## 2023-03-01 PROCEDURE — 1101F PT FALLS ASSESS-DOCD LE1/YR: CPT | Mod: HCNC,CPTII,S$GLB, | Performed by: NURSE PRACTITIONER

## 2023-03-01 PROCEDURE — 1160F PR REVIEW ALL MEDS BY PRESCRIBER/CLIN PHARMACIST DOCUMENTED: ICD-10-PCS | Mod: HCNC,CPTII,S$GLB, | Performed by: NURSE PRACTITIONER

## 2023-03-01 PROCEDURE — 3288F PR FALLS RISK ASSESSMENT DOCUMENTED: ICD-10-PCS | Mod: HCNC,CPTII,S$GLB, | Performed by: NURSE PRACTITIONER

## 2023-03-01 PROCEDURE — 1160F RVW MEDS BY RX/DR IN RCRD: CPT | Mod: HCNC,CPTII,S$GLB, | Performed by: NURSE PRACTITIONER

## 2023-03-01 PROCEDURE — 1125F PR PAIN SEVERITY QUANTIFIED, PAIN PRESENT: ICD-10-PCS | Mod: HCNC,CPTII,S$GLB, | Performed by: NURSE PRACTITIONER

## 2023-03-01 RX ORDER — HYDROCORTISONE 25 MG/G
CREAM TOPICAL 2 TIMES DAILY
Qty: 28 G | Refills: 1 | Status: SHIPPED | OUTPATIENT
Start: 2023-03-01

## 2023-03-01 NOTE — TELEPHONE ENCOUNTER
----- Message from Stacey Raman NP sent at 3/1/2023  9:44 AM CST -----  Regarding: colon  Please schedule next avail colon, can be w GI or CRS

## 2023-03-01 NOTE — PATIENT INSTRUCTIONS
Start fiber supplement such as Fibercon (capsule) Citrucel or Metamucil every day, increase as tolerated per  instructions to achieve one to three well formed and easy to pass stools per 7 day period  Water intake of 64 oz per day  May use prescribed cortisone cream to to rectum twice a day not to exceed more than 2 weeks at a time. Take 2 week medication vacation holiday then resume as needed.  Warm sitz baths as needed  Do not sit on the toilet for more than 5 mins at a time

## 2023-03-01 NOTE — PROGRESS NOTES
CRS Office Visit History and Physical    Referring Md:   Gloria Smith Md  [address] [place] [address]    SUBJECTIVE:     Chief Complaint: fecal incontinence    History of Present Illness:  The patient is new patient to this practice.   Course is as follows:  Patient is a 77 y.o. female presents with fecal incontinence. This started about 6 months ago. Usually happens when she stands. She is unsure when it happens. Usually a fecal seepage vs an actual full BM. Baseline hx of constipation. +rectal pressure  No bleeding.   +lower abd pain  is not currently taking fiber supplement or stool softener  Blood thinners: No    Last Colonoscopy: 10/2020 colon w [facility]. 8 polyps removed. One was 1.2 cm  Family history of colorectal cancer or IBD: Brother CRC.    Review of patient's allergies indicates:   Allergen Reactions    Meperidine Hives and Other (See Comments)     Unknown reaction         Past Medical History:   Diagnosis Date    Allergic rhinitis     Anxiety     Colon polyps     COVID-19 11/25/2020    Hyperlipidemia     Hypertension     Multiple thyroid nodules      Past Surgical History:   Procedure Laterality Date    APPENDECTOMY      BIOPSY OF THYROID      HYSTERECTOMY      INCISIONAL HERNIA REPAIR      LAPAROTOMY       Family History   Problem Relation Age of Onset    Hypertension Mother     Cancer Mother         small intestines    Heart failure Mother     Hypertension Father     Hypertension Sister     Kidney failure Sister         ESRD on HD    Diabetes type I Sister     Hypothyroidism Sister     Hypertension Brother     Colon cancer Brother     Heart failure Brother     Kidney failure Brother         ESRD on HD     Social History     Tobacco Use    Smoking status: Never    Smokeless tobacco: Never   Substance Use Topics    Alcohol use: Not Currently    Drug use: Never        Review of Systems:  ROS    OBJECTIVE:     Vital Signs (Most Recent)  /78 (BP Location: Left arm, Patient  "Position: Sitting, BP Method: Large (Automatic))   Pulse 68   Ht 5' 2" (1.575 m)   Wt 82.8 kg (182 lb 8.7 oz)   BMI 33.39 kg/m²     Physical Exam:  General: Black or  female in no distress   Neuro: Alert and oriented to person, place, and time.  Moves all extremities.     HEENT: No icterus.  Trachea midline  Respiratory: Respirations are even and unlabored, no cough or audible wheezing  Skin: Warm dry and intact, No visible rashes, no jaundice    Labs reviewed today:  Lab Results   Component Value Date    WBC 4.09 02/06/2023    HGB 13.9 02/06/2023    HCT 41.1 02/06/2023     02/06/2023    CHOL 143 02/06/2023    TRIG 82 02/06/2023    HDL 41 02/06/2023    ALT 17 02/06/2023    AST 24 02/06/2023     02/06/2023    K 3.8 02/06/2023     02/06/2023    CREATININE 0.9 02/06/2023    BUN 16 02/06/2023    CO2 24 02/06/2023    TSH 2.23 01/21/2022    INR 1.0 05/01/2007    HGBA1C 6.4 (H) 02/06/2023     Endoscopy reviewed today:  10/2020 colon w [facility]. 8 polyps removed. One was 1.2 cm    Anorectal Exam:    Anal Skin: Normal    Digital Rectal Exam:  Resting Tone normal  Squeeze normal  Relaxation with bear down present  Mass none  Tenderness  absent    Anoscopy:  Verbal consent was obtained.   Clear plastic anoscope inserted.    Hemorrhoids  present but very very minimal  Stigmata of bleeding  absent  Stigmata of prolapsed  absent  Distal rectal mucosa normal      ASSESSMENT/PLAN:     Jasmin was seen today for fecal incontinence.    Diagnoses and all orders for this visit:    Rectal pressure  -     hydrocortisone (ANUSOL-HC) 2.5 % rectal cream; Place rectally 2 (two) times daily.  -     Ambulatory referral/consult to Endo Procedure ; Future    Incontinence of feces, unspecified fecal incontinence type  -     Ambulatory referral/consult to Gastroenterology  -     Ambulatory referral/consult to Endo Procedure ; Future    FH: colon cancer  -     Ambulatory referral/consult to Endo " Procedure ; Future      77 y.o. M here today for 6 mo hx of Fecal incontinence and rectal pressure. Exam unrevealing today. FH of CRC in brother. She has PH of colon polyps, due for c scope later this year    The patient was instructed to:  colonoscopy  Increase water intake to at least 8-10 glasses of water per day.  Take a daily fiber supplement (Konsyl, Benefiber, Metamucil) and increase dietary intake to 20-30 grams/day.  Avoid straining or spending >5min/event with bowel movements.    If above normal pt to lmk and will send to PFPT, this was discussed      MARSHALL Tariq-JACQUES  Colon and Rectal Surgery

## 2023-03-08 ENCOUNTER — TELEPHONE (OUTPATIENT)
Dept: SURGERY | Facility: CLINIC | Age: 78
End: 2023-03-08
Payer: MEDICARE

## 2023-03-08 NOTE — TELEPHONE ENCOUNTER
Taking benefiber every day and her Rectal pressure and fecal incontinence has resolved. She is very happy with her improvement. We agreed to wait for colonoscopy till October when she is due.

## 2023-03-08 NOTE — TELEPHONE ENCOUNTER
----- Message from Tila Nielsen RN sent at 3/8/2023  5:36 AM CST -----  Regarding: FW: pt advice  Contact: [phone]    ----- Message -----  From: Marina Summers  Sent: 3/7/2023   4:57 PM CST  To: Dan[facility] Ibarra Staff  Subject: pt advice                                        Pt requesting a call regarding her care. Pt states she needs to provide update of care. Please call to discuss further.

## 2023-04-19 ENCOUNTER — OFFICE VISIT (OUTPATIENT)
Dept: SLEEP MEDICINE | Facility: CLINIC | Age: 78
End: 2023-04-19
Payer: MEDICARE

## 2023-04-19 ENCOUNTER — PATIENT MESSAGE (OUTPATIENT)
Dept: SLEEP MEDICINE | Facility: CLINIC | Age: 78
End: 2023-04-19

## 2023-04-19 DIAGNOSIS — J30.9 ALLERGIC SINUSITIS: Primary | ICD-10-CM

## 2023-04-19 DIAGNOSIS — G47.33 OSA (OBSTRUCTIVE SLEEP APNEA): ICD-10-CM

## 2023-04-19 DIAGNOSIS — F51.09 OTHER INSOMNIA NOT DUE TO A SUBSTANCE OR KNOWN PHYSIOLOGICAL CONDITION: ICD-10-CM

## 2023-04-19 DIAGNOSIS — R35.1 NOCTURIA: ICD-10-CM

## 2023-04-19 PROCEDURE — 99214 PR OFFICE/OUTPT VISIT, EST, LEVL IV, 30-39 MIN: ICD-10-PCS | Mod: 95,,, | Performed by: INTERNAL MEDICINE

## 2023-04-19 PROCEDURE — 99214 OFFICE O/P EST MOD 30 MIN: CPT | Mod: 95,,, | Performed by: INTERNAL MEDICINE

## 2023-04-19 RX ORDER — MOMETASONE FUROATE 50 UG/1
2 SPRAY, METERED NASAL DAILY
Qty: 17 G | Refills: 5 | Status: SHIPPED | OUTPATIENT
Start: 2023-04-19 | End: 2023-07-06

## 2023-04-19 NOTE — PROGRESS NOTES
The patient location is: [place]  The chief complaint leading to consultation is: NIK    Visit type: audiovisual    15 minutes of total time spent on the encounter, which includes face to face time and non-face to face time preparing to see the patient (eg, review of tests), Obtaining and/or reviewing separately obtained history, Documenting clinical information in the electronic or other health record, Independently interpreting results (not separately reported) and communicating results to the patient/family/caregiver, or Care coordination (not separately reported).     Each patient to whom he or she provides medical services by telemedicine is:  (1) informed of the relationship between the physician and patient and the respective role of any other health care provider with respect to management of the patient; and (2) notified that he or she may decline to receive medical services by telemedicine and may withdraw from such care at any time.    ESTABLISHED PATIENT VISIT    Jasmin Alfaro  is a pleasant 77 y.o. female  with PMH significant for HTN, pre DM who presented early 2022 for evaluation of snoring.  Found to have moderate NIK on HST.    Here today for CPAP follow-up    PLAN last visit:   -trial of astelin  -adjust CPAP pressures to 10-14, set ramp 5 x auto      Since last visit:   She is wearing CPAP nightly  Sometimes dozes off without it  Some trouble breathing     PAP history   Problems    Mask Nasal    Pressure Feels ok now   DME HME   Machine age 2022   Download 4/17/23: 26/30 x 7h 20min, 5-12 (7.3/10.1/11.3), Leak 2/17/29, AHI 0.4         SLEEP SCHEDULE   Bed Time 10:30-11P   Sleep Latency Not long   Arousals Frequently, often up at 2/ 3AM   Nocturia 2-3   Back to sleep Can take awhile   Wake time 8:30A   Naps none   Work Works at a [employer], [employer]       Past Medical History:   Diagnosis Date    Allergic rhinitis     Anxiety     Colon polyps     COVID-19 11/25/2020     Hyperlipidemia     Hypertension     Multiple thyroid nodules      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 , Disp: , Rfl:     atenoloL (TENORMIN) 50 MG tablet, Take 1 tablet (50 mg total) by mouth once daily., Disp: 90 tablet, Rfl: 1    azelastine (ASTELIN) 137 mcg (0.1 %) nasal spray, 1 spray (137 mcg total) by Nasal route 2 (two) times daily., Disp: 30 mL, Rfl: 3    diclofenac sodium (VOLTAREN) 1 % Gel, Apply 2 g topically 4 (four) times daily., Disp: 1 Tube, Rfl: 3    EPINEPHrine (EPIPEN 2-YAA) 0.3 mg/0.3 mL AtIn, Inject 0.3 mLs (0.3 mg total) into the muscle as needed., Disp: 1 Device, Rfl: 0    fluticasone propionate (FLONASE) 50 mcg/actuation nasal spray, 2 sprays (100 mcg total) by Each Nostril route once daily., Disp: 48 g, Rfl: 3    gabapentin (NEURONTIN) 100 MG capsule, TAKE 1 CAPSULE THREE TIMES DAILY, Disp: 90 capsule, Rfl: 2    hydroCHLOROthiazide (HYDRODIURIL) 25 MG tablet, Take 1 tablet (25 mg total) by mouth once daily., Disp: 90 tablet, Rfl: 1    hydrocortisone (ANUSOL-HC) 2.5 % rectal cream, Place rectally 2 (two) times daily., Disp: 28 g, Rfl: 1    loratadine (CLARITIN) 10 mg tablet, Take 10 mg by mouth daily as needed. , Disp: , Rfl:     multivit with minerals/lutein (MULTIVITAMIN 50 PLUS ORAL), Take by  mouth once daily. , Disp: , Rfl:     omeprazole (PRILOSEC) 20 MG capsule, Take 1 capsule (20 mg total) by mouth once daily., Disp: 90 capsule, Rfl: 0    rosuvastatin (CRESTOR) 5 MG tablet, Take 1 tablet (5 mg total) by mouth once daily., Disp: 90 tablet, Rfl: 1     There were no vitals filed for this visit.    Physical Exam:    GEN:   Well-appearing  Psych:  Appropriate affect, demonstrates insight  SKIN:  No rash on the face or bridge of the nose      LABS:   Lab Results   Component Value Date    HGB 13.9 02/06/2023    CO2 24 02/06/2023       RECORDS REVIEWED PREVIOUSLY:    HST 6.3.22: AHI 22, RDI 28, <90% x 18%    11.1.22: 14/30 x 5h 52m, 5-12 (8/10/10.8), Leak 11/27/23, AHI 1.2    ASSESSMENT    No flowsheet data found.  PROBLEM DESCRIPTION/ Sx on Presentation Interval Hx STATUS   NIK   + loud snoring, + snoring arousals, no gasping arousals no recent bed partner  HEENT: MP1, + oropharynx shallow in A-P dimension   Good usage and efficacy controlled   Daytime Sx   occasional sleepiness when inactive in the afternoon  denies sleepiness when driving   ESS 5/24 on intake  PAP: improved Improved energy Some improvement   Insomnia   Waking frequently  Prior hypnotics:        Current hypnotics:   PAP: waking less frequently   Waking less frequently improved   Sinus congestion   Less congested with nasal sprays  Antihistamine: antihistamine  Nasal sprays: flonase daily  and astelin (nose is a little too dry)  Surgeries:      Nose feels dry    Nocturia   x 1-3 per sleep period Still about 2 times per night stable   Weight gain Feels like she Has gained about 10 pounds since CPAP       Subjective weight gain since CPAP persistent   Other issues:     PLAN     -adjust 7-12, ramp 5 x 5  -humidity up to 4 due to dry mouth  -change flonase to nasonex   -discussed limiting liquids 2 hours before bed  -the patient is using and benefiting from PAP therapy    RTC yearly          The patient was given open opportunity to ask  questions and/or express concerns about treatment plan.   All questions/concerns were discussed.     Two patient identifiers used prior to evaluation.

## 2023-06-28 ENCOUNTER — PES CALL (OUTPATIENT)
Dept: ADMINISTRATIVE | Facility: CLINIC | Age: 78
End: 2023-06-28
Payer: MEDICARE

## 2023-06-28 ENCOUNTER — HOSPITAL ENCOUNTER (OUTPATIENT)
Dept: RADIOLOGY | Facility: HOSPITAL | Age: 78
Discharge: HOME | End: 2023-06-28
Attending: INTERNAL MEDICINE
Payer: MEDICARE

## 2023-06-28 DIAGNOSIS — Z12.31 SCREENING MAMMOGRAM, ENCOUNTER FOR: ICD-10-CM

## 2023-06-28 PROCEDURE — 77067 SCR MAMMO BI INCL CAD: CPT | Mod: TC,HCNC

## 2023-06-28 PROCEDURE — 77067 SCR MAMMO BI INCL CAD: CPT | Mod: 26,HCNC,, | Performed by: RADIOLOGY

## 2023-06-28 PROCEDURE — 77063 MAMMO DIGITAL SCREENING BILAT WITH TOMO: ICD-10-PCS | Mod: 26,HCNC,, | Performed by: RADIOLOGY

## 2023-06-28 PROCEDURE — 77067 MAMMO DIGITAL SCREENING BILAT WITH TOMO: ICD-10-PCS | Mod: 26,HCNC,, | Performed by: RADIOLOGY

## 2023-06-28 PROCEDURE — 77063 BREAST TOMOSYNTHESIS BI: CPT | Mod: 26,HCNC,, | Performed by: RADIOLOGY

## 2023-07-02 NOTE — PROGRESS NOTES
"[facility] Note    Chief Complaint      Chief Complaint   Patient presents with    Follow-up       History of Present Illness      Jasmin Alfaro is a 78 y.o. AAF with HTN, HLD, AR, and Anxiety presents to  to fu chronic issues.  Last visit - 1/12/23     Fatty Liver - Abd U/S - 11/3/22- fatty Liver. Rec limit tylenol and alcohol. Rec diet and exercise for wt loss.  As discussed at visit there is a potential for cirrhosis.       Mod/Severe NIK - Snoring - Fu by sleep clinic. Sleep study - 6/3/22 - Moderately severe Obstructive Sleep Apnea. She is on an APAP - 7-12 cmH2O. She uses it nightly and rarely does not use it (4 times/month).        Cervical Radiculopathy - MRI C-spine - 10/6/21- Spondylosis of the cervical spine, there is no severe canal stenosis. Fu by Ortho. C-spine Xrays - 2/25/22 - Mild degenerative changes as above and minimal instability. Pt on gabapentin 100 mg TID prn.Pt completed Ptx.       Cisco Heel pain - She c/o Cisco heel pain.  Xray 4/29/21 - Achilles enthesopathy and plantar calcaneal spur noted bilaterally. Gel insert helps. Calf pain improved with stretching. She tries to walk. 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 "It's been a lot better".      Chronic Left Knee pain -She has had this pain "for a couple yrs".  Tylenol - it helps a little but not much. It can be 2-8/10 in severity if she is on her legs too long.   No trauma. No swelling.  No erythema, or warmth.  No h/o Gout.   Cisco Knee Xray - 4/29/21 -  Moderate loss of tibiofemoral cartilage space with osteophyte production.  She has trouble going up stairs.  Her Rt knee gives out/mahad.  She would like a letter so she can get an apartment on the 1st floor in a senior Independent Living facility. She uses Tylenol daily.  She will alert me for any GI S.E.      Prediabetes - HA1c -6.4. Rec low carb diet.  Cont to monitor.        HTN - Controlled on Atenolol 50 mg/d, Amlodipine 10 mg/d, HCTZ 25 mg QAm.   "   HLD - Pt controlled in feb. on Crestor 5 mg Qother day.  CK level was slightly high but stable at 247.  Her leg cramps have gotten better since starting magnesium and gabapentin. +atherosclerosis seen on prev Xray.      AR -Pt on Claritin prn and Flonase daily.      Anxiety - Pt doing well off Lexapro 10 mg/d due to wt gain. Stable off meds.       GERD - Pt on Famotidine prn - no longer helping.  Rec Reflux prec. She has been eating later 7-8 PM. Pt doig better on Prilosec daily   prn.      MNG - Pt prev fu by ENT, Dr. Patel.  She then fu for a second opinion at [facility] with Dr. Robyn Mann. She is now fu by Dr. Romero. Thyroid U/S - 8/3/22 - You still have a multinodular goiter. There were 8 nodules all of which appear stable. The largest nodule was 1.4 cm. Repeat U/S.     Osteopenia - h/o Rt humeral neck fracture.  She has not been able to go the gym due to the pandemic.  She tries to walk. Cont Calcium and Vit d. Repeat DEXA.     Fecal incontinence - Resolved.  Taking benefiber every day and her Rectal pressure and fecal incontinence has resolved. Planning for C-scope in Oct. Doing better on Citrucel.      H/O PE - '74- She was tx at the time.  Pt no longer on Anticoagulation.      HCM - Flu - none ;  Tdap - ?< 10 yrs;  PCV 13 - 6/2/20;  PVX 23 - 7/14/21;  Shingrix - none- declines due to finances;  COVID 19 Vaccine #1 - 3/3/21; #2 - 3/26/21; # 3 - 1/10/22; # 4 9/21/22; # 5 ;  MGM - 6/28/23 - repeat 1 yr;  DEXA - 11/13/20 - + Osteopenia;  PAP - no longer gets; Hep C Screen - neg - 6/2/20; C-scope - 10/15/20  +Polyps- told to repeat in 3 yrs; GI - Dr. Sanchez;  Prev PCP - me then Dr. Kennedy; Ortho - Dr. Rodrigues; Ophtho - ; Endo - Dr. Romero; Podiatry - Dr. Oconnor     Patient Care Team:  Gloria Smith MD as PCP - General (Internal Medicine)  Izzy Alfaro MA as Care Coordinator     Health Maintenance:  Immunization History   Administered Date(s) Administered    COVID-19  MRNA, LN-S PF (MODERNA HALF 0.25 ML DOSE) 01/10/2022    COVID-19, MRNA, LN-S, PF (Pfizer) (Purple Cap) 03/03/2021, 03/26/2021    COVID-19, mRNA, LNP-S, bivalent booster, PF (PFIZER OMICRON) 09/21/2022    Pneumococcal Conjugate - 13 Valent 06/02/2020    Pneumococcal Polysaccharide - 23 Valent 07/14/2021      Health Maintenance   Topic Date Due    TETANUS VACCINE  Never done    DEXA Scan  11/13/2023    Lipid Panel  02/06/2028    Hepatitis C Screening  Completed        Past Medical History:  Past Medical History:   Diagnosis Date    Allergic rhinitis     Anxiety     Colon polyps     COVID-19 11/25/2020    Hyperlipidemia     Hypertension     Multiple thyroid nodules        Past Surgical History:   has a past surgical history that includes Hysterectomy; Appendectomy; Incisional hernia repair; Laparotomy; and Biopsy of thyroid.    Family History:  family history includes Cancer in her mother; Colon cancer in her brother; Diabetes type I in her sister; Heart failure in her brother and mother; Hypertension in her brother, father, mother, and sister; Hypothyroidism in her sister; Kidney failure in her brother and sister.     Social History:  Social History     Tobacco Use    Smoking status: Never    Smokeless tobacco: Never   Substance Use Topics    Alcohol use: Not Currently    Drug use: Never       Review of Systems   Constitutional:  Negative for chills, diaphoresis and fever.   HENT:  Negative for hearing loss.    Eyes:  Negative for visual disturbance.   Respiratory:  Negative for chest tightness and shortness of breath.    Cardiovascular:  Negative for chest pain and palpitations.   Gastrointestinal:  Positive for abdominal pain. Negative for constipation, diarrhea, nausea and vomiting.        Doing better on citrucel.   Endocrine: Negative for cold intolerance, heat intolerance and polydipsia.   Genitourinary:  Negative for bladder incontinence, dysuria and frequency.   Musculoskeletal:  Positive for arthralgias and  "neck pain.        "My whole body".    Neurological:  Positive for headaches. Negative for dizziness.        Rare   Psychiatric/Behavioral:  Negative for dysphoric mood. The patient is not nervous/anxious.       Medications:    Current Outpatient Medications:     aspirin 81 MG Chew, Take 81 mg by mouth once daily. , Disp: , Rfl:     azelastine (ASTELIN) 137 mcg (0.1 %) nasal spray, 1 spray (137 mcg total) by Nasal route 2 (two) times daily., Disp: 30 mL, Rfl: 3    diclofenac sodium (VOLTAREN) 1 % Gel, Apply 2 g topically 4 (four) times daily., Disp: 1 Tube, Rfl: 3    fluticasone propionate (FLONASE) 50 mcg/actuation nasal spray, 2 sprays (100 mcg total) by Each Nostril route once daily., Disp: 48 g, Rfl: 3    gabapentin (NEURONTIN) 100 MG capsule, TAKE 1 CAPSULE THREE TIMES DAILY, Disp: 90 capsule, Rfl: 2    hydrocortisone (ANUSOL-HC) 2.5 % rectal cream, Place rectally 2 (two) times daily., Disp: 28 g, Rfl: 1    loratadine (CLARITIN) 10 mg tablet, Take 10 mg by mouth daily as needed. , Disp: , Rfl:     multivit with minerals/lutein (MULTIVITAMIN 50 PLUS ORAL), Take by mouth once daily. , Disp: , Rfl:     rosuvastatin (CRESTOR) 5 MG tablet, Take 1 tablet (5 mg total) by mouth once daily., Disp: 90 tablet, Rfl: 1    amLODIPine (NORVASC) 10 MG tablet, Take 1 tablet (10 mg total) by mouth once daily., Disp: 90 tablet, Rfl: 1    atenoloL (TENORMIN) 50 MG tablet, Take 1 tablet (50 mg total) by mouth once daily., Disp: 90 tablet, Rfl: 1    EPINEPHrine (EPIPEN 2-YAA) 0.3 mg/0.3 mL AtIn, Inject 0.3 mLs (0.3 mg total) into the muscle as needed., Disp: 1 each, Rfl: 0    hydroCHLOROthiazide (HYDRODIURIL) 25 MG tablet, Take 1 tablet (25 mg total) by mouth once daily., Disp: 90 tablet, Rfl: 1    omeprazole (PRILOSEC) 20 MG capsule, Take 1 capsule (20 mg total) by mouth once daily., Disp: 90 capsule, Rfl: 0     Allergies:  Review of patient's allergies indicates:   Allergen Reactions    Meperidine Hives and Other (See Comments)     " "Unknown reaction         Physical Exam      Vital Signs  Temp: 98 °F (36.7 °C)  Temp Source: Oral  Pulse: 65  Resp: 18  SpO2: 97 %  BP: 126/74  BP Location: Left arm  Patient Position: Sitting  Pain Score: 0-No pain  Height and Weight  Height: 5' 2" (157.5 cm)  Weight: 83.5 kg (184 lb 1.4 oz)  BSA (Calculated - sq m): 1.91 sq meters  BMI (Calculated): 33.7  Weight in (lb) to have BMI = 25: 136.4      Patient Position: Sitting      Physical Exam  Vitals reviewed.   Constitutional:       General: She is not in acute distress.     Appearance: Normal appearance. She is not ill-appearing, toxic-appearing or diaphoretic.   HENT:      Head: Normocephalic and atraumatic.      Right Ear: Tympanic membrane normal.      Left Ear: Tympanic membrane normal.   Eyes:      Extraocular Movements: Extraocular movements intact.      Conjunctiva/sclera: Conjunctivae normal.      Pupils: Pupils are equal, round, and reactive to light.      Comments: Left cataract   Neck:      Vascular: No carotid bruit.      Comments: Nodular goiter - NTTP  Cardiovascular:      Rate and Rhythm: Normal rate and regular rhythm.      Pulses: Normal pulses.      Heart sounds: Normal heart sounds.   Pulmonary:      Effort: Pulmonary effort is normal. No respiratory distress.      Breath sounds: Normal breath sounds.   Abdominal:      General: Bowel sounds are normal. There is no distension.      Palpations: Abdomen is soft.      Tenderness: There is no abdominal tenderness. There is no guarding or rebound.   Musculoskeletal:      Cervical back: Neck supple. No tenderness.   Neurological:      General: No focal deficit present.      Mental Status: She is alert and oriented to person, place, and time.   Psychiatric:         Mood and Affect: Mood normal.         Behavior: Behavior normal.        Laboratory:  CBC:  Recent Labs   Lab 02/06/23  0926   WBC 4.09   RBC 4.48   Hemoglobin 13.9   Hematocrit 41.1   Platelets 159   MCV 92   MCH 31.0   MCHC 33.8 "       CMP:  Recent Labs   Lab 07/29/21 0919 01/21/22 0824 08/03/22  1024 02/06/23  0926   Glucose 106   < > 117 H 112 H   Calcium 10.2   < > 9.7 9.7   Albumin 3.9  --  4.1 3.8   Total Protein 7.6  --  7.7 7.4   Sodium 140   < > 140 137   Potassium 3.6   < > 3.6 3.8   CO2 27   < > 25 24   Chloride 104   < > 105 101   BUN 21   < > 21 16   Creatinine 1.2   < > 1.0 0.9   Alkaline Phosphatase 69  --  88 83   ALT 20  --  20 17   AST 30  --  29 24   Total Bilirubin 0.6  --  0.6 0.4    < > = values in this interval not displayed.           URINALYSIS:  Recent Labs   Lab 10/19/22  1548   Color, UA Colorless A   Specific Gravity, UA 1.010   pH, UA 7.0   Protein, UA Negative   Nitrite, UA Negative   Leukocytes, UA Negative        LIPIDS:  Recent Labs   Lab 10/01/20  0925 07/29/21  0919 01/21/22  0824 08/03/22  1024 02/06/23  0926   TSH 1.549  --  2.23  --   --    HDL  --  44  --  44 41   Cholesterol  --  143  --  161 143   Triglycerides  --  82  --  101 82   LDL Cholesterol  --  82.6  --  96.8 85.6   HDL/Cholesterol Ratio  --  30.8  --  27.3 28.7   Non-HDL Cholesterol  --  99  --  117 102   Total Cholesterol/HDL Ratio  --  3.3  --  3.7 3.5       TSH:  Recent Labs   Lab 10/01/20  0925 01/21/22  0824   TSH 1.549 2.23       A1C:  Recent Labs   Lab 10/01/20  0925 07/29/21  0919 08/03/22  1024 02/06/23  0926   Hemoglobin A1C 6.2 H 6.1 H 6.4 H 6.4 H     Other:   Recent Labs   Lab 10/01/20  0925   Vit D, 25-Hydroxy 37           Assessment/Plan     Jasmin Alfaro is a 78 y.o.female with:    Multiple thyroid nodules  -     US Soft Tissue Head Neck Thyroid; Future; Expected date: 08/10/2023  - Stable. Repeat U/S.     Hypertension, unspecified type  -     amLODIPine (NORVASC) 10 MG tablet; Take 1 tablet (10 mg total) by mouth once daily.  Dispense: 90 tablet; Refill: 1  -     atenoloL (TENORMIN) 50 MG tablet; Take 1 tablet (50 mg total) by mouth once daily.  Dispense: 90 tablet; Refill: 1  -     hydroCHLOROthiazide (HYDRODIURIL)  25 MG tablet; Take 1 tablet (25 mg total) by mouth once daily.  Dispense: 90 tablet; Refill: 1  -     Comprehensive Metabolic Panel; Future; Expected date: 08/06/2023  - Controlled.  Cont current.     Prediabetes  -     Hemoglobin A1C; Future; Expected date: 08/06/2023  - Stable.  Cont low carb diet.     Hyperlipidemia, unspecified hyperlipidemia type  - Stable.  Cont current regimen.    NIK (obstructive sleep apnea)  - Stable.  Cont current regimen.    Fatty liver  - Rec limit tylenol and alcohol.  Rec diet and exercise for wt loss.      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  Dispense: 1 each; Refill: 0  - Refill Epi Pen.     Gastroesophageal reflux disease, unspecified whether esophagitis present  -     omeprazole (PRILOSEC) 20 MG capsule; Take 1 capsule (20 mg total) by mouth once daily.  Dispense: 90 capsule; Refill: 0  - Stable.  Cont current regimen.    Aortic atherosclerosis  - Stable.  Cont current regimen.    Osteopenia, unspecified location  - Stable.  Cont current regimen.    Postmenopausal  -     DXA Bone Density Axial Skeleton 1 or more sites; Future; Expected date: 07/06/2023         Chronic conditions status updated as per HPI.  Other than changes above, cont current medications and maintain follow up with specialists.  Follow up in about 6 months (around 1/6/2024) for fu chronic issues or sooner if needed.      Gloria Smith MD  [facility]

## 2023-07-03 NOTE — PROGRESS NOTES
I sent pt a my chart message -  I reviewed your Mammogram -   The breasts have scattered areas of fibroglandular density. There is no evidence of suspicious masses, microcalcifications or architectural distortion.  Impression:   No mammographic evidence of malignancy.  Routine screening mammogram in 1 year is recommended.

## 2023-07-06 ENCOUNTER — OFFICE VISIT (OUTPATIENT)
Dept: PRIMARY CARE CLINIC | Facility: CLINIC | Age: 78
End: 2023-07-06
Payer: MEDICARE

## 2023-07-06 VITALS
BODY MASS INDEX: 33.87 KG/M2 | HEIGHT: 62 IN | HEART RATE: 65 BPM | TEMPERATURE: 98 F | RESPIRATION RATE: 18 BRPM | OXYGEN SATURATION: 97 % | WEIGHT: 184.06 LBS | DIASTOLIC BLOOD PRESSURE: 74 MMHG | SYSTOLIC BLOOD PRESSURE: 126 MMHG

## 2023-07-06 DIAGNOSIS — M85.80 OSTEOPENIA, UNSPECIFIED LOCATION: ICD-10-CM

## 2023-07-06 DIAGNOSIS — Z78.0 POSTMENOPAUSAL: ICD-10-CM

## 2023-07-06 DIAGNOSIS — K21.9 GASTROESOPHAGEAL REFLUX DISEASE, UNSPECIFIED WHETHER ESOPHAGITIS PRESENT: ICD-10-CM

## 2023-07-06 DIAGNOSIS — K76.0 FATTY LIVER: ICD-10-CM

## 2023-07-06 DIAGNOSIS — T78.00XA ALLERGY WITH ANAPHYLAXIS DUE TO FOOD: ICD-10-CM

## 2023-07-06 DIAGNOSIS — R73.03 PREDIABETES: ICD-10-CM

## 2023-07-06 DIAGNOSIS — E04.2 MULTIPLE THYROID NODULES: Primary | ICD-10-CM

## 2023-07-06 DIAGNOSIS — I70.0 AORTIC ATHEROSCLEROSIS: ICD-10-CM

## 2023-07-06 DIAGNOSIS — I10 HYPERTENSION, UNSPECIFIED TYPE: ICD-10-CM

## 2023-07-06 DIAGNOSIS — E78.5 HYPERLIPIDEMIA, UNSPECIFIED HYPERLIPIDEMIA TYPE: ICD-10-CM

## 2023-07-06 DIAGNOSIS — G47.33 OSA (OBSTRUCTIVE SLEEP APNEA): ICD-10-CM

## 2023-07-06 PROCEDURE — 3078F PR MOST RECENT DIASTOLIC BLOOD PRESSURE < 80 MM HG: ICD-10-PCS | Mod: HCNC,CPTII,S$GLB, | Performed by: INTERNAL MEDICINE

## 2023-07-06 PROCEDURE — 1126F AMNT PAIN NOTED NONE PRSNT: CPT | Mod: HCNC,CPTII,S$GLB, | Performed by: INTERNAL MEDICINE

## 2023-07-06 PROCEDURE — 1126F PR PAIN SEVERITY QUANTIFIED, NO PAIN PRESENT: ICD-10-PCS | Mod: HCNC,CPTII,S$GLB, | Performed by: INTERNAL MEDICINE

## 2023-07-06 PROCEDURE — 99999 PR PBB SHADOW E&M-EST. PATIENT-LVL IV: ICD-10-PCS | Mod: PBBFAC,HCNC,, | Performed by: INTERNAL MEDICINE

## 2023-07-06 PROCEDURE — 1101F PR PT FALLS ASSESS DOC 0-1 FALLS W/OUT INJ PAST YR: ICD-10-PCS | Mod: HCNC,CPTII,S$GLB, | Performed by: INTERNAL MEDICINE

## 2023-07-06 PROCEDURE — 3074F SYST BP LT 130 MM HG: CPT | Mod: HCNC,CPTII,S$GLB, | Performed by: INTERNAL MEDICINE

## 2023-07-06 PROCEDURE — 1159F PR MEDICATION LIST DOCUMENTED IN MEDICAL RECORD: ICD-10-PCS | Mod: HCNC,CPTII,S$GLB, | Performed by: INTERNAL MEDICINE

## 2023-07-06 PROCEDURE — 1160F PR REVIEW ALL MEDS BY PRESCRIBER/CLIN PHARMACIST DOCUMENTED: ICD-10-PCS | Mod: HCNC,CPTII,S$GLB, | Performed by: INTERNAL MEDICINE

## 2023-07-06 PROCEDURE — 99999 PR PBB SHADOW E&M-EST. PATIENT-LVL IV: CPT | Mod: PBBFAC,HCNC,, | Performed by: INTERNAL MEDICINE

## 2023-07-06 PROCEDURE — 3288F FALL RISK ASSESSMENT DOCD: CPT | Mod: HCNC,CPTII,S$GLB, | Performed by: INTERNAL MEDICINE

## 2023-07-06 PROCEDURE — 1160F RVW MEDS BY RX/DR IN RCRD: CPT | Mod: HCNC,CPTII,S$GLB, | Performed by: INTERNAL MEDICINE

## 2023-07-06 PROCEDURE — 3074F PR MOST RECENT SYSTOLIC BLOOD PRESSURE < 130 MM HG: ICD-10-PCS | Mod: HCNC,CPTII,S$GLB, | Performed by: INTERNAL MEDICINE

## 2023-07-06 PROCEDURE — 1101F PT FALLS ASSESS-DOCD LE1/YR: CPT | Mod: HCNC,CPTII,S$GLB, | Performed by: INTERNAL MEDICINE

## 2023-07-06 PROCEDURE — 3288F PR FALLS RISK ASSESSMENT DOCUMENTED: ICD-10-PCS | Mod: HCNC,CPTII,S$GLB, | Performed by: INTERNAL MEDICINE

## 2023-07-06 PROCEDURE — 1159F MED LIST DOCD IN RCRD: CPT | Mod: HCNC,CPTII,S$GLB, | Performed by: INTERNAL MEDICINE

## 2023-07-06 PROCEDURE — 99214 PR OFFICE/OUTPT VISIT, EST, LEVL IV, 30-39 MIN: ICD-10-PCS | Mod: HCNC,S$GLB,, | Performed by: INTERNAL MEDICINE

## 2023-07-06 PROCEDURE — 99214 OFFICE O/P EST MOD 30 MIN: CPT | Mod: HCNC,S$GLB,, | Performed by: INTERNAL MEDICINE

## 2023-07-06 PROCEDURE — 3078F DIAST BP <80 MM HG: CPT | Mod: HCNC,CPTII,S$GLB, | Performed by: INTERNAL MEDICINE

## 2023-07-06 RX ORDER — ATENOLOL 50 MG/1
50 TABLET ORAL DAILY
Qty: 90 TABLET | Refills: 1 | Status: SHIPPED | OUTPATIENT
Start: 2023-07-06 | End: 2024-03-25 | Stop reason: SDUPTHER

## 2023-07-06 RX ORDER — HYDROCHLOROTHIAZIDE 25 MG/1
25 TABLET ORAL DAILY
Qty: 90 TABLET | Refills: 1 | Status: SHIPPED | OUTPATIENT
Start: 2023-07-06 | End: 2024-03-25 | Stop reason: SDUPTHER

## 2023-07-06 RX ORDER — AMLODIPINE BESYLATE 10 MG/1
10 TABLET ORAL DAILY
Qty: 90 TABLET | Refills: 1 | Status: SHIPPED | OUTPATIENT
Start: 2023-07-06 | End: 2024-03-25 | Stop reason: SDUPTHER

## 2023-07-06 RX ORDER — EPINEPHRINE 0.3 MG/.3ML
1 INJECTION SUBCUTANEOUS
Qty: 1 EACH | Refills: 0 | Status: SHIPPED | OUTPATIENT
Start: 2023-07-06

## 2023-07-06 RX ORDER — OMEPRAZOLE 20 MG/1
20 CAPSULE, DELAYED RELEASE ORAL DAILY
Qty: 90 CAPSULE | Refills: 0 | Status: SHIPPED | OUTPATIENT
Start: 2023-07-06 | End: 2023-09-18

## 2023-08-02 ENCOUNTER — OFFICE VISIT (OUTPATIENT)
Dept: PRIMARY CARE CLINIC | Facility: CLINIC | Age: 78
End: 2023-08-02
Payer: MEDICARE

## 2023-08-02 VITALS
SYSTOLIC BLOOD PRESSURE: 124 MMHG | HEART RATE: 60 BPM | OXYGEN SATURATION: 99 % | HEIGHT: 62 IN | WEIGHT: 183.19 LBS | BODY MASS INDEX: 33.71 KG/M2 | DIASTOLIC BLOOD PRESSURE: 62 MMHG

## 2023-08-02 DIAGNOSIS — I70.0 AORTIC ATHEROSCLEROSIS: ICD-10-CM

## 2023-08-02 DIAGNOSIS — I10 PRIMARY HYPERTENSION: ICD-10-CM

## 2023-08-02 DIAGNOSIS — Z00.00 ENCOUNTER FOR PREVENTIVE HEALTH EXAMINATION: Primary | ICD-10-CM

## 2023-08-02 DIAGNOSIS — K21.9 GASTROESOPHAGEAL REFLUX DISEASE, UNSPECIFIED WHETHER ESOPHAGITIS PRESENT: ICD-10-CM

## 2023-08-02 DIAGNOSIS — M19.90 OSTEOARTHRITIS, UNSPECIFIED OSTEOARTHRITIS TYPE, UNSPECIFIED SITE: ICD-10-CM

## 2023-08-02 DIAGNOSIS — E78.5 HYPERLIPIDEMIA, UNSPECIFIED HYPERLIPIDEMIA TYPE: ICD-10-CM

## 2023-08-02 DIAGNOSIS — G47.33 OSA (OBSTRUCTIVE SLEEP APNEA): ICD-10-CM

## 2023-08-02 DIAGNOSIS — R73.03 PREDIABETES: ICD-10-CM

## 2023-08-02 DIAGNOSIS — M85.80 OSTEOPENIA, UNSPECIFIED LOCATION: ICD-10-CM

## 2023-08-02 DIAGNOSIS — N18.31 CHRONIC KIDNEY DISEASE, STAGE 3A: ICD-10-CM

## 2023-08-02 PROCEDURE — 3074F PR MOST RECENT SYSTOLIC BLOOD PRESSURE < 130 MM HG: ICD-10-PCS | Mod: HCNC,CPTII,S$GLB,

## 2023-08-02 PROCEDURE — 1159F MED LIST DOCD IN RCRD: CPT | Mod: HCNC,CPTII,S$GLB,

## 2023-08-02 PROCEDURE — 1126F AMNT PAIN NOTED NONE PRSNT: CPT | Mod: HCNC,CPTII,S$GLB,

## 2023-08-02 PROCEDURE — 1159F PR MEDICATION LIST DOCUMENTED IN MEDICAL RECORD: ICD-10-PCS | Mod: HCNC,CPTII,S$GLB,

## 2023-08-02 PROCEDURE — 3078F DIAST BP <80 MM HG: CPT | Mod: HCNC,CPTII,S$GLB,

## 2023-08-02 PROCEDURE — G0439 PR MEDICARE ANNUAL WELLNESS SUBSEQUENT VISIT: ICD-10-PCS | Mod: HCNC,S$GLB,,

## 2023-08-02 PROCEDURE — 1101F PT FALLS ASSESS-DOCD LE1/YR: CPT | Mod: HCNC,CPTII,S$GLB,

## 2023-08-02 PROCEDURE — 1126F PR PAIN SEVERITY QUANTIFIED, NO PAIN PRESENT: ICD-10-PCS | Mod: HCNC,CPTII,S$GLB,

## 2023-08-02 PROCEDURE — G0439 PPPS, SUBSEQ VISIT: HCPCS | Mod: HCNC,S$GLB,,

## 2023-08-02 PROCEDURE — 99999 PR PBB SHADOW E&M-EST. PATIENT-LVL V: CPT | Mod: PBBFAC,HCNC,,

## 2023-08-02 PROCEDURE — 3074F SYST BP LT 130 MM HG: CPT | Mod: HCNC,CPTII,S$GLB,

## 2023-08-02 PROCEDURE — 99999 PR PBB SHADOW E&M-EST. PATIENT-LVL V: ICD-10-PCS | Mod: PBBFAC,HCNC,,

## 2023-08-02 PROCEDURE — 3288F PR FALLS RISK ASSESSMENT DOCUMENTED: ICD-10-PCS | Mod: HCNC,CPTII,S$GLB,

## 2023-08-02 PROCEDURE — 1160F PR REVIEW ALL MEDS BY PRESCRIBER/CLIN PHARMACIST DOCUMENTED: ICD-10-PCS | Mod: HCNC,CPTII,S$GLB,

## 2023-08-02 PROCEDURE — 3288F FALL RISK ASSESSMENT DOCD: CPT | Mod: HCNC,CPTII,S$GLB,

## 2023-08-02 PROCEDURE — 1160F RVW MEDS BY RX/DR IN RCRD: CPT | Mod: HCNC,CPTII,S$GLB,

## 2023-08-02 PROCEDURE — 3078F PR MOST RECENT DIASTOLIC BLOOD PRESSURE < 80 MM HG: ICD-10-PCS | Mod: HCNC,CPTII,S$GLB,

## 2023-08-02 PROCEDURE — 1101F PR PT FALLS ASSESS DOC 0-1 FALLS W/OUT INJ PAST YR: ICD-10-PCS | Mod: HCNC,CPTII,S$GLB,

## 2023-08-02 NOTE — PATIENT INSTRUCTIONS
Counseling and Referral of Other Preventative  (Italic type indicates deductible and co-insurance are waived)    Patient Name: Jasmin Alfaro  Today's Date: 8/8/2023    Health Maintenance       Date Due Completion Date    TETANUS VACCINE Never done ---    Shingles Vaccine (1 of 2) Never done ---    COVID-19 Vaccine (5 - Pfizer series) 01/21/2023 9/21/2022    Influenza Vaccine (1) 09/01/2023 ---    DEXA Scan 11/13/2023 11/13/2020    Hemoglobin A1c (Prediabetes) 02/06/2024 2/6/2023    Lipid Panel 02/06/2028 2/6/2023        No orders of the defined types were placed in this encounter.    The following information is provided to all patients.  This information is to help you find resources for any of the problems found today that may be affecting your health:                Living healthy guide: www.[facility].[place].[facility]      Understanding Diabetes: www.diabetes.org      Eating healthy: www.cdc.gov/healthyweight      [facility] home safety checklist: www.cdc.gov/steadi/patient.html      Agency on Aging: www.goea.[place]      Alcoholics anonymous (AA): www.aa.org      Physical Activity: www.valente.nih.gov/oo1bvtg      Tobacco use: www.quitwithusla.org     Counseling and Referral of Other Preventative  (Italic type indicates deductible and co-insurance are waived)    Patient Name: Jasmin Alfaro  Today's Date: 8/8/2023    Health Maintenance       Date Due Completion Date    TETANUS VACCINE Never done ---    Shingles Vaccine (1 of 2) Never done ---    COVID-19 Vaccine (5 - Pfizer series) 01/21/2023 9/21/2022    Influenza Vaccine (1) 09/01/2023 ---    DEXA Scan 11/13/2023 11/13/2020    Hemoglobin A1c (Prediabetes) 02/06/2024 2/6/2023    Lipid Panel 02/06/2028 2/6/2023        No orders of the defined types were placed in this encounter.    The following information is provided to all patients.  This information is to help you find resources for any of the problems found today that may be affecting your health:                Living  healthy guide: www.[facility].[place].[facility]      Understanding Diabetes: www.diabetes.org      Eating healthy: www.cdc.gov/healthyweight      CDC home safety checklist: www.cdc.gov/steadi/patient.html      Agency on Aging: www.goea.[place]      Alcoholics anonymous (AA): www.aa.org      Physical Activity: www.valente.nih.gov/am4aykt      Tobacco use: www.quitwithusla.org

## 2023-08-02 NOTE — PROGRESS NOTES
"  Jasmin Alfaro presented for a  Medicare AWV and comprehensive Health Risk Assessment today. She is a patient of Dr. Smith and is new to me. The following components were reviewed and updated:    Medical history  Family History  Social history  Allergies and Current Medications  Health Risk Assessment  Health Maintenance  Care Team     ** See Completed Assessments for Annual Wellness Visit within the encounter summary.**    The following assessments were completed:  Living Situation  CAGE  Depression Screening  Timed Get Up and Go  Whisper Test  Cognitive Function Screening  Nutrition Screening  ADL Screening  PAQ Screening  Review for opioid screen: pt does not have rx for opioid  Review for substance use disorder:  pt does not use substance          Vitals:    08/02/23 0852   BP: 124/62   BP Location: Right arm   Patient Position: Sitting   BP Method: Medium (Manual)   Pulse: 60   SpO2: 99%   Weight: 83.1 kg (183 lb 3.2 oz)   Height: 5' 2" (1.575 m)     Body mass index is 33.51 kg/m².  Physical Exam  Vitals and nursing note reviewed.   Constitutional:       Appearance: Normal appearance.   HENT:      Head: Normocephalic and atraumatic.   Cardiovascular:      Rate and Rhythm: Normal rate and regular rhythm.      Pulses: Normal pulses.           Radial pulses are 2+ on the right side and 2+ on the left side.        Dorsalis pedis pulses are 2+ on the right side and 2+ on the left side.        Posterior tibial pulses are 2+ on the right side and 2+ on the left side.      Heart sounds: Normal heart sounds.   Pulmonary:      Effort: Pulmonary effort is normal.      Breath sounds: Normal breath sounds.   Musculoskeletal:      Right lower leg: No edema.      Left lower leg: No edema.   Skin:     General: Skin is warm and dry.      Capillary Refill: Capillary refill takes less than 2 seconds.   Neurological:      General: No focal deficit present.      Mental Status: She is alert and oriented to person, place, and " time.   Psychiatric:         Mood and Affect: Mood normal.         Behavior: Behavior normal.       Diagnoses and health risks identified today and associated recommendations/orders:    1. Encounter for preventive health examination  - Assessment and evaluation performed as stated above    2. Aortic atherosclerosis  - stable on statin and aspirin.  Followed by pcp    3. Chronic kidney disease, stage 3a  - stable, followed by pcp    4. Primary hypertension  - stable on amlodipine, atenolol, HCTZ.  Followed by pcp.    5. Hyperlipidemia, unspecified hyperlipidemia type  - stable on statin.  Followed by pcp    6. Prediabetes  - stable on diet and exercise.  Followed by pcp.    7. Gastroesophageal reflux disease, unspecified whether esophagitis present  - stable on omeprazole.  Followed by pcp    8. Osteopenia, unspecified location  - stable on current regimen.  Followed by pcp    9. Osteoarthritis, unspecified osteoarthritis type, unspecified site  - stable on current regimen.  Followed by pcp    10. NIK (obstructive sleep apnea)  - stable, followed by sleep medicine.      Provided Ivory with a 5-10 year written screening schedule and personal prevention plan. Recommendations were developed using the USPSTF age appropriate recommendations. Education, counseling, and referrals were provided as needed. After Visit Summary printed and given to patient which includes a list of additional screenings\tests needed.    Follow up in about 1 year (around 8/2/2024).      Elizabet Downs NP  I offered to discuss advanced care planning, including how to pick a person who would make decisions for you if you were unable to make them for yourself, called a health care power of , and what kind of decisions you might make such as use of life sustaining treatments such as ventilators and tube feeding when faced with a life limiting illness recorded on a living will that they will need to know. (How you want to be cared for as you  near the end of your natural life)     X Patient is interested in learning more about how to make advanced directives.  I provided them paperwork and offered to discuss this with them.

## 2023-08-08 ENCOUNTER — HOSPITAL ENCOUNTER (OUTPATIENT)
Dept: RADIOLOGY | Facility: OTHER | Age: 78
Discharge: HOME | End: 2023-08-08
Attending: INTERNAL MEDICINE
Payer: MEDICARE

## 2023-08-08 DIAGNOSIS — E04.2 MULTIPLE THYROID NODULES: ICD-10-CM

## 2023-08-08 PROCEDURE — 76536 US EXAM OF HEAD AND NECK: CPT | Mod: 26,HCNC,, | Performed by: RADIOLOGY

## 2023-08-08 PROCEDURE — 76536 US SOFT TISSUE HEAD NECK THYROID: ICD-10-PCS | Mod: 26,HCNC,, | Performed by: RADIOLOGY

## 2023-08-08 PROCEDURE — 76536 US EXAM OF HEAD AND NECK: CPT | Mod: TC,HCNC

## 2023-08-08 NOTE — PROGRESS NOTES
I sent pt a my chart message -  I reviewed your Thyroid Ultrasound which showed a Stable multinodular thyroid gland.  No nodules meet criteria for follow-up or FNA.  Dr. GALVAN

## 2023-08-09 DIAGNOSIS — E11.9 TYPE 2 DIABETES MELLITUS WITHOUT COMPLICATION, WITHOUT LONG-TERM CURRENT USE OF INSULIN: Primary | ICD-10-CM

## 2023-08-16 ENCOUNTER — TELEPHONE (OUTPATIENT)
Dept: PRIMARY CARE CLINIC | Facility: CLINIC | Age: 78
End: 2023-08-16
Payer: MEDICARE

## 2023-08-16 NOTE — TELEPHONE ENCOUNTER
----- Message from Jody Romero sent at 8/16/2023  9:07 AM CDT -----  Contact: [phone]  [email or web address]     Patient is calling for Medical Advice regarding: pt would like a call to discuss testing her blood pressure and what times. She just received the monitor on yesterday.     How long has patient had these symptoms:    Pharmacy name and phone#:    Would like response via [employer]:  no    Comments:

## 2023-08-16 NOTE — TELEPHONE ENCOUNTER
I sw pt. She received her BG machine from her pharmacy yesterday and was wondering how often should she test. I informed her that  wants her to do so once daily. She has diabetic teaching appt on Friday

## 2023-08-18 ENCOUNTER — CLINICAL SUPPORT (OUTPATIENT)
Dept: DIABETES | Facility: CLINIC | Age: 78
End: 2023-08-18
Payer: MEDICARE

## 2023-08-18 VITALS — WEIGHT: 184.31 LBS | HEIGHT: 62 IN | BODY MASS INDEX: 33.92 KG/M2

## 2023-08-18 DIAGNOSIS — E11.9 TYPE 2 DIABETES MELLITUS WITHOUT COMPLICATION, WITHOUT LONG-TERM CURRENT USE OF INSULIN: ICD-10-CM

## 2023-08-18 PROCEDURE — 99999 PR PBB SHADOW E&M-EST. PATIENT-LVL I: CPT | Mod: PBBFAC,HCNC,,

## 2023-08-18 PROCEDURE — G0108 DIAB MANAGE TRN  PER INDIV: HCPCS | Mod: HCNC,S$GLB,, | Performed by: FAMILY MEDICINE

## 2023-08-18 PROCEDURE — 99999 PR PBB SHADOW E&M-EST. PATIENT-LVL I: ICD-10-PCS | Mod: PBBFAC,HCNC,,

## 2023-08-18 PROCEDURE — G0108 PR DIAB MANAGE TRN  PER INDIV: ICD-10-PCS | Mod: HCNC,S$GLB,, | Performed by: FAMILY MEDICINE

## 2023-08-18 NOTE — PROGRESS NOTES
Diabetes Care Specialist Progress Note  Author: Janelle Tinoco RD, CDE  Date: 8/18/2023    Program Intake  Reason for Diabetes Program Visit:: Initial Diabetes Assessment  Current diabetes risk level:: low  In the last 12 months, have you:: none    Lab Results   Component Value Date    HGBA1C 6.6 (H) 08/08/2023       Clinical    Patient Health Rating  Compared to other people your age, how would you rate your health?: Good    Problem Review  Reviewed Problem List with Patient: yes  Active comorbidities affecting diabetes self-care.: yes  Comorbidities: Hypertension, Chronic Kidney Disease    Clinical Assessment  Current Diabetes Treatment: Diet, Exercise  Have you ever experienced hypoglycemia (low blood sugar)?: no  Have you ever experienced hyperglycemia (high blood sugar)?: no    Medication Information  How do you obtain your medications?: Patient drives    Labs  Do you have regular lab work to monitor your medications?: Yes  Type of Regular Lab Work: A1c, Cholesterol, Microalbumin  Where do you get your labs drawn?: [facility]  Lab Compliance Barriers: No    Nutritional Status  Meal Plan 24 Hour Recall: Breakfast, Lunch, Dinner, Snack ( about 1 year -  does a lot of cooking and uses a lot of butters, creams, fried foods, etc - pt has noticed wt gain)  Meal Plan 24 Hour Recall - Breakfast: egg, avocado, cheese, low carb tortilla, coffee with SF creamer  Meal Plan 24 Hour Recall - Lunch: salad (lettuce, tomato, cucumber) with turkey, maybe some crackers, diet coke/diet sprite or water  Meal Plan 24 Hour Recall - Dinner: shrimp and crawfish fettucini (~1 cup), greens, water  Meal Plan 24 Hour Recall - Snack: 1/2 banana or cutie orange  Change in appetite?: No  Recent Changes in Weight: Weight Gain    Additional Social History    Support  Does anyone support you with your diabetes care?: yes  Who supports you?: spouse  Who takes you to your medical appointments?: self    Access to Mass Media &  Technology  Does the patient have access to any of the following devices or technologies?: Smart phone, Internet Access  Media or technology needs impacting ability to self-manage diabetes?: No    Cognitive/Behavioral Health  Alert and Oriented: Yes  Difficulty Thinking: No  Requires Prompting: No  Requires assistance for routine expression?: No  Cognitive or behavioral barriers impacting ability to self-manage diabetes?: No    Culture/Anabaptism  Culture or Latter day beliefs that may impact ability to access healthcare: No    Communication  Language preference: English  Hearing Problems: No  Vision Problems: Yes  Vision problem type:: Decreased Vision  Vision Assistance: Glasses  Communication needs impacting ability to self-manage diabetes?: No    Health Literacy  Preferred Learning Method: Face to Face, Reading Materials  How often do you need to have someone help you read instructions, pamphlets, or written material from your doctor or pharmacy?: Never  Health literacy needs impacting ability to self-manage diabetes?: No      Diabetes Self-Management Skills Assessment    Diabetes Disease Process/Treatment Options  Patient/caregiver able to state what happens when someone has diabetes.: somewhat  Patient/caregiver knows what type of diabetes they have.: yes  Diabetes Type : Type II  Patient/caregiver able to identify at least three signs and symptoms of diabetes.: no  Patient able to identify at least three risk factors for diabetes.: no  Diabetes Disease Process/Treatment Options: Skills Assessment Completed: Yes  Assessment indicates:: Instruction Needed  Area of need?: Yes    Nutrition/Healthy Eating  Challenges to healthy eating:: other (see comments) (foods  prepares are higher fat, higher carb than she normally would have)  Method of carbohydrate measurement:: eyeballing/guessing  Patient can identify foods that impact blood sugar.: yes  Patient-identified foods:: fruit/fruit juice, starches (bread,  pasta, rice, cereal), soda, sweets  Nutrition/Healthy Eating Skills Assessment Completed:: Yes  Assessment indicates:: Instruction Needed  Area of need?: Yes    Physical Activity/Exercise  Patient's daily activity level:: lightly active  Patient formally exercises outside of work.: yes  Exercise Type: using exercise equipment, walking (just started back at gym about 2 weeks ago - working with  - currently going 2x/wk, plan to increase to 3-4x/wk)  Intensity: Moderate  Frequency: twice a week  Duration: 1 hour  Patient can identify forms of physical activity.: yes  Stated forms of physical activity:: moving to burn calories, any movement performed by muscles that uses energy  Patient can identify reasons why exercise/physical activity is important in diabetes management.: yes  Identified reasons:: lowers blood glucose, blood pressure, and cholesterol, tones muscles, strengthens heart, muscles, and bones  Physical Activity/Exercise Skills Assessment Completed: : Yes  Assessment indicates:: Instruction Needed  Area of need?: Yes    Medications  Patient is able to describe current diabetes management routine.: yes  Diabetes management routine:: diet, exercise  Patient is able to identify current diabetes medications, dosages, and appropriate timing of medications.:  (not currently on any dm meds)  Medication Skills Assessment Completed:: Yes  Assessment indicates:: Adequate understanding  Area of need?: No    Home Blood Glucose Monitoring  Patient states that blood sugar is checked at home daily.: yes  Monitoring Method:: home glucometer  How often do you check your blood sugar?: Once a day  When do you check your blood sugar?: Before breakfast, Before bedtime  When you check what is your typical blood sugar range? : Started testing last night - 121 (about 3 hr after dinner), 134 fasting this am  Blood glucose logs:: yes, encouraged to keep logs, encouraged to bring logs to provider visits  Blood glucose logs  reviewed today?: yes  Home Blood Glucose Monitoring Skills Assessment Completed: : Yes  Assessment indicates:: Instruction Needed  Area of need?: Yes    Acute Complications  Acute Complications Skills Assessment Completed: : No  Deffered due to:: Time    Chronic Complications  Chronic Complications Skills Assessment Completed: : No  Deferred due to:: Time    Psychosocial/Coping  Psychosocial/Coping Skills Assessment Completed: : No  Deffered due to:: Time      Assessment Summary and Plan    Based on today's diabetes care assessment, the following areas of need were identified:      Social 8/18/2023   Access to Mass Media/Tech No   Cognitive/Behavioral Health No   Culture/Gnosticist No   Communication No   Health Literacy No        Clinical 8/18/2023   Lab Compliance No        Diabetes Self-Management Skills 8/18/2023   Diabetes Disease Process/Treatment Options Yes -   Ed on what DM is, insulin resistance, beta cell burnout  Ed on importance of using lifestyle changes + appropriate medications to control BG and slow disease progression     Nutrition/Healthy Eating Yes - see care planning   Physical Activity/Exercise Yes - see care planning   Medication No   Home Blood Glucose Monitoring Yes - see care planning          Today's interventions were provided through individual discussion, instruction, and written materials were provided.      Patient verbalized understanding of instruction and written materials.  Pt was able to return back demonstration of instructions today. Patient understood key points, needs reinforcement and further instruction.     Diabetes Self-Management Care Plan:    Today's Diabetes Self-Management Care Plan was developed with Jasmin's input. Jasmin has agreed to work toward the following goal(s) to improve his/her overall diabetes control.      Care Plan: Diabetes Management   Updates made since 7/19/2023 12:00 AM        Problem: Healthy Eating         Goal: Will increase serving of non-starchy  vegetables at dinner and limit CHO to fist size portion.    Start Date: 8/18/2023   Expected End Date: 9/17/2023   Priority: Low   Barriers: No Barriers Identified   Note:    8/18/23 - Reports was diagnosed with dm in 1988 but was able to bring A1C down to normal through diet and exercise and was in remission from diabetes all these years. She has continued to follow carb controlled diet avoiding sugary beverages, sweets, and limiting starch portions. She had been single for ~13 years and got  about a year ago. Since marriage, eating habits have changed significantly as  loves to cook.  has pre-diabetes but cooks with a lot of high sat fat (butters, creams, fried foods) and white pasta/rice. She has noticed wt gain. Reviewed sources of CHO, portion sizes using dry measuring cups and fist/hand size, choosing higher fiber/complex carbs, label reading, and balancing meals with lean protein and non-starchy veg. Discussed while fats do not directly raise BG, likely gaining weight from increased fat intake, which in turn increases insulin resistance. Encouraged choosing more heart-healthy unsat fats and limiting amounts. Reviewed some examples of meal planning using plate method.        Task: Reviewed the sources and role of Carbohydrate, Protein, and Fat and how each nutrient impacts blood sugar. Completed 8/18/2023        Task: Provided visual examples using dry measuring cups, food models, and other familiar objects such as computer mouse, deck or cards, tennis ball etc. to help with visualization of portions. Completed 8/18/2023        Task: Review the importance of balancing carbohydrates with each meal using portion control techniques to count servings of carbohydrate and label reading to identify serving size and amount of total carbs per serving. Completed 8/18/2023        Task: Provided Sample plate method and reviewed the use of the plate to estimate amounts of carbohydrate per meal.  Completed 8/18/2023        Problem: Physical Activity and Exercise         Goal: Patient agrees to increase physical activity to a goal of 3 times per week for 60 minutes.    Start Date: 8/18/2023   Expected End Date: 9/17/2023   Priority: Medium   Barriers: No Barriers Identified   Note:    8/18/23 - Pt desires to lose weight and tone abs and glutes. Discussed how physical activity can reduce insulin resistance and have a lasting effect on BG. Reviewed benefits of increasing to 150 min per week cumulative and encouraged continuing mix of cardio and strength training. Pt is working with .        Task: Discussed role of physical activity on reducing insulin resistance and improvement in overall glycemic control.         Task: Discussed role of physical activity as it relates to weight loss         Task: Offered suggestions on how patient could increase their regular physical activity         Task: Reviewed blood glucose monitoring before, during and after exercise/activity         Problem: Blood Glucose Self-Monitoring         Goal: Patient agrees to check and record blood sugars 1-2 times per day.    Start Date: 8/18/2023   Expected End Date: 9/17/2023   Priority: High   Barriers: No Barriers Identified   Note:    8/18/23 - Started SMBG last night and appropriately kept BG log. Applauded efforts and encouraged continuing with testing 1-2 times per day.   Ed on importance of SMBG and goal BG ranges.  Ed on using BG trends to track progress of routine changes   Ed on when to call clinic with out of range patterns   Ed on how improved POC BG values translate to improved A1C - ed on current A1C and goal A1C and when next A1C is due         Task: Reviewed the importance of self-monitoring blood glucose and keeping logs. Completed 8/18/2023        Task: Provided patient with blood glucose logs, reviewed appropriate timing and frequency to SMBG, education on parameters on when to notify provider and advised  patient to bring logs to all appts with PCP/Endocrinologist/Diabetes Care Specialist. Completed 8/18/2023          Follow Up Plan     Follow up in about 3 months (around 11/18/2023) for 3 month follow-up.    Today's care plan and follow up schedule was discussed with patient.  Ivory verbalized understanding of the care plan, goals, and agrees to follow up plan.        The patient was encouraged to communicate with his/her health care provider/physician and care team regarding his/her condition(s) and treatment.  I provided the patient with my contact information today and encouraged to contact me via phone or [facility] Patient Portal as needed.     Length of Visit   Total Time: 60 Minutes

## 2023-08-23 ENCOUNTER — LAB VISIT (OUTPATIENT)
Dept: LAB | Facility: HOSPITAL | Age: 78
End: 2023-08-23
Attending: INTERNAL MEDICINE
Payer: MEDICARE

## 2023-08-23 DIAGNOSIS — E87.6 HYPOKALEMIA: ICD-10-CM

## 2023-08-23 LAB
ANION GAP SERPL CALC-SCNC: 12 MMOL/L (ref 8–16)
BUN SERPL-MCNC: 19 MG/DL (ref 8–23)
CALCIUM SERPL-MCNC: 10 MG/DL (ref 8.7–10.5)
CHLORIDE SERPL-SCNC: 99 MMOL/L (ref 95–110)
CO2 SERPL-SCNC: 26 MMOL/L (ref 23–29)
CREAT SERPL-MCNC: 1.1 MG/DL (ref 0.5–1.4)
EST. GFR  (NO RACE VARIABLE): 51.8 ML/MIN/1.73 M^2
GLUCOSE SERPL-MCNC: 196 MG/DL (ref 70–110)
MAGNESIUM SERPL-MCNC: 1.8 MG/DL (ref 1.6–2.6)
POTASSIUM SERPL-SCNC: 3.6 MMOL/L (ref 3.5–5.1)
SODIUM SERPL-SCNC: 137 MMOL/L (ref 136–145)

## 2023-08-23 PROCEDURE — 83735 ASSAY OF MAGNESIUM: CPT | Mod: HCNC | Performed by: INTERNAL MEDICINE

## 2023-08-23 PROCEDURE — 36415 COLL VENOUS BLD VENIPUNCTURE: CPT | Mod: HCNC,PN | Performed by: INTERNAL MEDICINE

## 2023-08-23 PROCEDURE — 80048 BASIC METABOLIC PNL TOTAL CA: CPT | Mod: HCNC | Performed by: INTERNAL MEDICINE

## 2023-08-25 NOTE — PROGRESS NOTES
I sent pt a my chart message -  I reviewed your labs Your kidney function was stable and Potassium was improved to 3.6. Your Magnesium was normal at 1.8.     Dr. GALVAN

## 2023-09-08 DIAGNOSIS — E87.6 HYPOKALEMIA: ICD-10-CM

## 2023-09-08 DIAGNOSIS — E11.9 TYPE 2 DIABETES MELLITUS WITHOUT COMPLICATION, WITHOUT LONG-TERM CURRENT USE OF INSULIN: ICD-10-CM

## 2023-09-08 RX ORDER — LANCETS
EACH MISCELLANEOUS
Qty: 100 EACH | Refills: 3 | Status: SHIPPED | OUTPATIENT
Start: 2023-09-08 | End: 2023-10-10 | Stop reason: SDUPTHER

## 2023-09-08 RX ORDER — POTASSIUM CHLORIDE 750 MG/1
TABLET, EXTENDED RELEASE ORAL
Qty: 30 TABLET | Refills: 1 | Status: SHIPPED | OUTPATIENT
Start: 2023-09-08 | End: 2023-12-08

## 2023-09-08 RX ORDER — INSULIN PUMP SYRINGE, 3 ML
EACH MISCELLANEOUS
Qty: 1 EACH | Refills: 0 | Status: SHIPPED | OUTPATIENT
Start: 2023-09-08 | End: 2024-09-07

## 2023-09-08 NOTE — TELEPHONE ENCOUNTER
----- Message from Ok Villalta sent at 9/8/2023  1:55 PM CDT -----  Contact: [phone] @ patient  Good afternoon patient wanted to let the doc and nurse know that [facility] will now be taking care of her med and that the office will been sending over some papers.

## 2023-09-08 NOTE — TELEPHONE ENCOUNTER
No care due was identified.  [facility] Embedded Care Due Messages. Reference number: [id number].   9/08/2023 2:42:09 PM CDT

## 2023-10-04 ENCOUNTER — TELEPHONE (OUTPATIENT)
Dept: INTERNAL MEDICINE | Facility: CLINIC | Age: 78
End: 2023-10-04
Payer: MEDICARE

## 2023-10-04 NOTE — TELEPHONE ENCOUNTER
----- Message from Sweta Smith sent at 10/4/2023 12:08 PM CDT -----  Contact: [phone]  Per pt, does she need to continue taking the potassium. Pt states she only has three pills left. If she does, she is requesting a refill be sent to   [facility] - [place], [place] - [address]  [address] [address]  Phone: [phone] Fax: [phone]            Thank you

## 2023-10-05 NOTE — TELEPHONE ENCOUNTER
Yes, she should continue the Potassium.  It looks like she should still have Potassium.  Please call and d/w pt    Dr. GALVAN

## 2023-10-05 NOTE — TELEPHONE ENCOUNTER
Pt was informed and expressed understanding. She will contact [facility] pharmacy regarding the rx that was sent on 9/8/23

## 2023-10-08 ENCOUNTER — PATIENT MESSAGE (OUTPATIENT)
Dept: PRIMARY CARE CLINIC | Facility: CLINIC | Age: 78
End: 2023-10-08
Payer: MEDICARE

## 2023-10-08 DIAGNOSIS — E11.9 TYPE 2 DIABETES MELLITUS WITHOUT COMPLICATION, WITHOUT LONG-TERM CURRENT USE OF INSULIN: ICD-10-CM

## 2023-10-09 RX ORDER — FLUTICASONE PROPIONATE 50 MCG
2 SPRAY, SUSPENSION (ML) NASAL DAILY
Qty: 48 G | Refills: 3 | Status: SHIPPED | OUTPATIENT
Start: 2023-10-09

## 2023-10-09 NOTE — TELEPHONE ENCOUNTER
No care due was identified.  [facility] Embedded Care Due Messages. Reference number: [id number].   10/09/2023 9:22:16 AM CDT

## 2023-10-10 ENCOUNTER — PATIENT MESSAGE (OUTPATIENT)
Dept: PRIMARY CARE CLINIC | Facility: CLINIC | Age: 78
End: 2023-10-10
Payer: MEDICARE

## 2023-10-10 DIAGNOSIS — E11.9 TYPE 2 DIABETES MELLITUS WITHOUT COMPLICATION, WITHOUT LONG-TERM CURRENT USE OF INSULIN: ICD-10-CM

## 2023-10-10 RX ORDER — LANCETS
EACH MISCELLANEOUS
Qty: 200 EACH | Refills: 3 | Status: SHIPPED | OUTPATIENT
Start: 2023-10-10

## 2023-10-10 NOTE — TELEPHONE ENCOUNTER
No care due was identified.  [facility] Embedded Care Due Messages. Reference number: [id number].   10/10/2023 2:53:58 PM CDT   3 = A little assistance

## 2023-10-23 DIAGNOSIS — E78.5 HYPERLIPIDEMIA, UNSPECIFIED HYPERLIPIDEMIA TYPE: ICD-10-CM

## 2023-10-24 RX ORDER — ROSUVASTATIN CALCIUM 5 MG/1
5 TABLET, COATED ORAL
Qty: 90 TABLET | Refills: 1 | Status: ON HOLD | OUTPATIENT
Start: 2023-10-24 | End: 2024-02-24 | Stop reason: HOSPADM

## 2023-11-07 NOTE — PROGRESS NOTES
"[facility] Note    Chief Complaint      Chief Complaint   Patient presents with    Follow-up       History of Present Illness      Jasmin Alfaro is a 78 y.o.  AAF with HTN, HLD, AR, and Anxiety presents to  to fu chronic issues.  Last visit - 7/6//23     Hypokalemia -  3.6 up from 3.4 .  Rec a high potassium diet (bananas, oranges, spinach, potato, mushrooms, peas, sweet potatoes). This may be due to Hydrochlorothiazideso we started Kcl 10 MEq Q M,W,F.     Fatty Liver - Abd U/S - 11/3/22- fatty Liver. Rec limit tylenol and alcohol. Rec diet and exercise for wt loss.  As discussed at visit there is a potential for cirrhosis.       Mod/Severe NIK - Snoring - Fu by sleep clinic. Sleep study - 6/3/22 - Moderately severe Obstructive Sleep Apnea. She is on an APAP - 7-12 cmH2O. She uses it nightly and rarely does not use it (4 times/month).   The mask bothers her at times so she will d/w DME.      Cervical Radiculopathy - MRI C-spine - 10/6/21- Spondylosis of the cervical spine, there is no severe canal stenosis. Fu by Ortho. C-spine Xrays - 2/25/22 - Mild degenerative changes as above and minimal instability. Pt on gabapentin 100 mg TID prn. Pt completed Ptx.       Cisco Heel pain -  ray 4/29/21 - Achilles enthesopathy and plantar calcaneal spur noted bilaterally. Gel insert helps. Calf pain improved with stretching.  "It's been a lot better".      Chronic Knee pain -She has had this pain "for a couple yrs".  Tylenol - it helps a little but not much. It can be 2-8/10 in severity if she is on her legs too long.   No trauma. No swelling.  No erythema, or warmth.  No h/o Gout.   Cisco Knee Xray - 4/29/21 -  Moderate loss of tibiofemoral cartilage space with osteophyte production.  She has trouble going up stairs.  Her Rt knee gives out/mahad.  Se now has an apartment on the 1st floor in a senior Independent Living facility. She uses Tylenol daily.  She will alert me for any GI S.E.      DM  II- HA1c " -6.6- 8/8/23. This is now consistent with Diabetes.  She had diabetic teaching. Rec a low carb diet.  I do not feel we need to start medication for this yet but will continue to monitor this.         HTN - Controlled on Atenolol 50 mg/d, Amlodipine 10 mg/d, HCTZ 25 mg QAm.     HLD - Pt controlled in feb. on Crestor 5 mg Qother day.  CK level was slightly high but stable at 247.  Her leg cramps have gotten better since starting magnesium and gabapentin. +atherosclerosis seen on prev Xray.      AR -Pt on Claritin prn and Flonase, Astelin prn.      Anxiety - Pt doing well off Lexapro 10 mg/d due to wt gain. Stable off meds.       GERD - Pt on Famotidine prn - no longer helping.  Rec Reflux prec. She has been eating later 7-8 PM. Pt doig better on Prilosec daily  prn.      MNG - Pt prev fu by ENT, Dr. Patel.  She then fu for a second opinion at [facility] with Dr. Robyn Mann. She is now fu by Dr. Romero. Thyroid u/s - 8/8/23 - a Stable multinodular thyroid gland.  No nodules meet criteria for follow-up or FNA.     Osteopenia - h/o Rt humeral neck fracture.   She tries to walk. Cont Calcium and Vit d.       Fecal incontinence - Resolved.  Taking benefiber every day and her Rectal pressure and fecal incontinence has resolved. Due for C-scope. Doing better on Citrucel.      H/O PE - '74- She was tx at the time.  Pt no longer on Anticoagulation.     Acc by      HCM - Flu - none -defers; RSV - none - defers;  Tdap - ?< 10 yrs;  PCV 13 - 6/2/20;  PVX 23 - 7/14/21;  Shingrix - # 1 8/15/23; # 2 ;  COVID 19 Vaccine #1 - 3/3/21; #2 - 3/26/21; # 3 - 1/10/22; # 4 9/21/22; # 5 ;  MGM - 6/28/23 - repeat 1 yr;  DEXA - 11/13/20 - + Osteopenia - has order;  PAP - no longer gets; Hep C Screen - neg - 6/2/20; C-scope - 10/15/20  +Polyps- told to repeat in 3 yrs; GI - Dr. Sanchez;  Prev PCP - me then Dr. Kennedy; Ortho - Dr. Rodrigues; Ophtho - ; Endo - Dr. Romero; Podiatry - Dr. Oconnor    Patient Care  Team:  Gloria Smith MD as PCP - General (Internal Medicine)  Izzy Alfaro MA as Care Coordinator  Janelle Tinoco RD, CDE as Diabetes Educator (Diabetes)     Health Maintenance:  Immunization History   Administered Date(s) Administered    COVID-19 MRNA, LN-S PF (MODERNA HALF 0.25 ML DOSE) 01/10/2022    COVID-19, MRNA, LN-S, PF (Pfizer) (Purple Cap) 03/03/2021, 03/26/2021    COVID-19, mRNA, LNP-S, bivalent booster, PF (PFIZER OMICRON) 09/21/2022    Pneumococcal Conjugate - 13 Valent 06/02/2020    Pneumococcal Polysaccharide - 23 Valent 07/14/2021      Health Maintenance   Topic Date Due    TETANUS VACCINE  Never done    Shingles Vaccine (2 of 2) 10/10/2023    DEXA Scan  11/13/2023    Lipid Panel  02/06/2028    Hepatitis C Screening  Completed        Past Medical History:  Past Medical History:   Diagnosis Date    Allergic rhinitis     Anxiety     Colon polyps     COVID-19 11/25/2020    Hyperlipidemia     Hypertension     Multiple thyroid nodules        Past Surgical History:   has a past surgical history that includes Hysterectomy; Appendectomy; Incisional hernia repair; Laparotomy; and Biopsy of thyroid.    Family History:  family history includes Cancer in her mother; Colon cancer in her brother; Diabetes type I in her sister; Heart failure in her brother and mother; Hypertension in her brother, father, mother, and sister; Hypothyroidism in her sister; Kidney failure in her brother and sister.     Social History:  Social History     Tobacco Use    Smoking status: Never    Smokeless tobacco: Never   Substance Use Topics    Alcohol use: Not Currently    Drug use: Never       Review of Systems   Constitutional:  Negative for chills, diaphoresis and fever.   HENT:  Negative for hearing loss.    Eyes:  Negative for visual disturbance.   Respiratory:  Negative for chest tightness and shortness of breath.    Cardiovascular:  Negative for chest pain and palpitations.   Gastrointestinal:  Negative for  abdominal pain, constipation, diarrhea, nausea and vomiting.   Endocrine: Negative for cold intolerance and heat intolerance.   Genitourinary:  Positive for bladder incontinence and urgency. Negative for dysuria and frequency.   Musculoskeletal:  Positive for arthralgias. Negative for myalgias.   Neurological:  Positive for headaches. Negative for dizziness.   Psychiatric/Behavioral:  Negative for dysphoric mood. The patient is not nervous/anxious.         Medications:    Current Outpatient Medications:     amLODIPine (NORVASC) 10 MG tablet, Take 1 tablet (10 mg total) by mouth once daily., Disp: 90 tablet, Rfl: 1    aspirin 81 MG Chew, Take 81 mg by mouth once daily. , Disp: , Rfl:     atenoloL (TENORMIN) 50 MG tablet, Take 1 tablet (50 mg total) by mouth once daily., Disp: 90 tablet, Rfl: 1    blood sugar diagnostic Strp, To check BG twice daily, to use with TrueMetrix meter, Disp: 200 strip, Rfl: 3    blood-glucose meter kit, To check BG once daily, to use with insurance preferred meter, Disp: 1 each, Rfl: 0    EPINEPHrine (EPIPEN 2-YAA) 0.3 mg/0.3 mL AtIn, Inject 0.3 mLs (0.3 mg total) into the muscle as needed., Disp: 1 each, Rfl: 0    fluticasone propionate (FLONASE) 50 mcg/actuation nasal spray, 2 sprays (100 mcg total) by Each Nostril route once daily., Disp: 48 g, Rfl: 3    hydroCHLOROthiazide (HYDRODIURIL) 25 MG tablet, Take 1 tablet (25 mg total) by mouth once daily., Disp: 90 tablet, Rfl: 1    hydrocortisone (ANUSOL-HC) 2.5 % rectal cream, Place rectally 2 (two) times daily., Disp: 28 g, Rfl: 1    lancets Misc, To check BG twice daily, to use with insurance preferred meter, Disp: 200 each, Rfl: 3    loratadine (CLARITIN) 10 mg tablet, Take 10 mg by mouth daily as needed. , Disp: , Rfl:     multivit with minerals/lutein (MULTIVITAMIN 50 PLUS ORAL), Take by mouth once daily. , Disp: , Rfl:     omeprazole (PRILOSEC) 20 MG capsule, TAKE 1 CAPSULE ONE TIME DAILY, Disp: 90 capsule, Rfl: 3    potassium chloride  "SA (K-DUR,KLOR-CON M) 10 MEQ tablet, 1 tab QM,W,F, Disp: 30 tablet, Rfl: 1    rosuvastatin (CRESTOR) 5 MG tablet, TAKE 1 TABLET ONE TIME DAILY, Disp: 90 tablet, Rfl: 1    azelastine (ASTELIN) 137 mcg (0.1 %) nasal spray, 1 spray (137 mcg total) by Nasal route 2 (two) times daily. As needed, Disp: 30 mL, Rfl: 3    diclofenac sodium (VOLTAREN) 1 % Gel, Apply 2 g topically 4 (four) times daily. As needed, Disp: 100 g, Rfl: 1    gabapentin (NEURONTIN) 100 MG capsule, TAKE 1 CAPSULE THREE TIMES DAILY (Patient taking differently: As needed), Disp: 90 capsule, Rfl: 2     Allergies:  Review of patient's allergies indicates:   Allergen Reactions    Meperidine Hives and Other (See Comments)     Unknown reaction         Physical Exam      Vital Signs  Temp: 98.3 °F (36.8 °C)  Temp Source: Oral  Pulse: 76  Resp: 18  SpO2: 98 %  BP: 137/68  BP Location: Left arm  Pain Score: 0-No pain  Height and Weight  Height: 5' 2" (157.5 cm)  Weight: 81 kg (178 lb 9.2 oz)  BSA (Calculated - sq m): 1.88 sq meters  BMI (Calculated): 32.7  Weight in (lb) to have BMI = 25: 136.4             Physical Exam  Vitals reviewed.   Constitutional:       General: She is not in acute distress.     Appearance: Normal appearance. She is obese. She is not ill-appearing, toxic-appearing or diaphoretic.   HENT:      Head: Normocephalic and atraumatic.      Right Ear: Tympanic membrane normal.      Left Ear: Tympanic membrane normal.      Mouth/Throat:      Mouth: Mucous membranes are moist.      Pharynx: No posterior oropharyngeal erythema.      Comments: No clicking of jaw; No pain on opening or closing jaw.  NTTP over astoid.  NTTP to mandible.  Near angle of mandible just under Left ear lobe - slightly TTP.  No palpable deformity.  No erythema.   Eyes:      Extraocular Movements: Extraocular movements intact.      Conjunctiva/sclera: Conjunctivae normal.      Pupils: Pupils are equal, round, and reactive to light.   Neck:      Vascular: No carotid bruit.    "   Comments: nodulaRGOITER - nttp. No LAD  Cardiovascular:      Rate and Rhythm: Normal rate and regular rhythm.      Pulses: Normal pulses.      Heart sounds: Normal heart sounds.      Comments: Trace BLE edema.   Pulmonary:      Effort: Pulmonary effort is normal. No respiratory distress.      Breath sounds: Normal breath sounds.   Abdominal:      General: Bowel sounds are normal. There is no distension.      Palpations: Abdomen is soft.      Tenderness: There is no abdominal tenderness. There is no guarding or rebound.   Musculoskeletal:      Cervical back: Neck supple. No tenderness.   Skin:     Comments: Dry skin and acrochordons to neck ant chest.    Neurological:      General: No focal deficit present.      Mental Status: She is alert and oriented to person, place, and time.   Psychiatric:         Mood and Affect: Mood normal.         Behavior: Behavior normal.          Laboratory:  CBC:  Recent Labs   Lab 02/06/23  0926   WBC 4.09   RBC 4.48   Hemoglobin 13.9   Hematocrit 41.1   Platelets 159   MCV 92   MCH 31.0   MCHC 33.8       CMP:  Recent Labs   Lab 08/03/22  1024 02/06/23  0926 08/08/23  1340 08/23/23  0959   Glucose 117 H 112 H 140 H 196 H   Calcium 9.7 9.7 10.2 10.0   Albumin 4.1 3.8 4.1  --    Total Protein 7.7 7.4 8.0  --    Sodium 140 137 136 137   Potassium 3.6 3.8 3.4 L 3.6   CO2 25 24 23 26   Chloride 105 101 101 99   BUN 21 16 16 19   Creatinine 1.0 0.9 1.1 1.1   Alkaline Phosphatase 88 83 97  --    ALT 20 17 29  --    AST 29 24 33  --    Total Bilirubin 0.6 0.4 0.5  --            URINALYSIS:  Recent Labs   Lab 10/19/22  1548 11/09/23  1037   Color, UA Colorless A Yellow   Clarity, UA  --  Clear   Specific [place], UA 1.010  --    Spec Grav UA  --  1.010   pH, UA 7.0 7   Protein, UA Negative  --    Nitrite, UA Negative negative   Leukocytes, UA Negative  --    Urobilinogen, UA  --  normal        LIPIDS:  Recent Labs   Lab 07/29/21  0919 01/21/22  0824 08/03/22  1024 02/06/23  0926   TSH  --   2.23  --   --    HDL 44  --  44 41   Cholesterol 143  --  161 143   Triglycerides 82  --  101 82   LDL Cholesterol 82.6  --  96.8 85.6   HDL/Cholesterol Ratio 30.8  --  27.3 28.7   Non-HDL Cholesterol 99  --  117 102   Total Cholesterol/HDL Ratio 3.3  --  3.7 3.5       TSH:  Recent Labs   Lab 01/21/22  0824   TSH 2.23       A1C:  Recent Labs   Lab 07/29/21  0919 08/03/22  1024 02/06/23  0926 08/08/23  1340   Hemoglobin A1C 6.1 H 6.4 H 6.4 H 6.6 H          Assessment/Plan     Jasmin Alfaro is a 78 y.o.female with:    Type 2 diabetes mellitus without complication, without long-term current use of insulin  -     Hemoglobin A1C; Future; Expected date: 02/09/2024  -     Microalbumin/Creatinine Ratio, Urine  -     Comprehensive Metabolic Panel; Future; Expected date: 02/09/2024  - Controlled.  Cont current.     Aortic atherosclerosis  - Stable.  Cont current regimen.    Primary hypertension  - Controlled.  Cont current.     Hyperlipidemia, unspecified hyperlipidemia type  -     Lipid Panel; Future; Expected date: 02/09/2024  - Stable.  Cont current regimen.    NIK (obstructive sleep apnea)  - Stable.  Cont current regimen. D/w DME regarding mask.     Fatty liver  - Rec  limit tylenol and alcohol.  Rec diet and exercise for wt loss.  As discussed at visit there is a potential for cirrhosis. Here is a website with information about fatty liver and inflammation related to fatty liver ([place]) = [email or web address]  AND www.moziy.com    Urge incontinence of urine  -     POCT urine dipstick without microscope  -     Urinalysis, Reflex to Urine Culture Urine, Clean Catch; Future; Expected date: 11/09/2023  - Check dip UA - 2+Leuk no Nitrite.  Will send for UA/reflex Ucx and tx if positive.  Pt declines pharmacotherapy for OAB. Rec bladder retraining as discussed.     Osteopenia, unspecified location  - Stable.  Cont current regimen.    Patellofemoral pain syndrome of both knees  -     diclofenac sodium (VOLTAREN) 1 %  Gel; Apply 2 g topically 4 (four) times daily. As needed  Dispense: 100 g; Refill: 1    Screening for colorectal cancer  -     Ambulatory referral/consult to Endo Procedure ; Future; Expected date: 11/10/2023    Other orders  -     azelastine (ASTELIN) 137 mcg (0.1 %) nasal spray; 1 spray (137 mcg total) by Nasal route 2 (two) times daily. As needed  Dispense: 30 mL; Refill: 3      Chronic conditions status updated as per HPI.  Other than changes above, cont current medications and maintain follow up with specialists.    Follow up in about 6 months (around 5/9/2024) for fu chronic issues or sooner if needed.      Gloria Smith MD  [facility]

## 2023-11-09 ENCOUNTER — OFFICE VISIT (OUTPATIENT)
Dept: PRIMARY CARE CLINIC | Facility: CLINIC | Age: 78
End: 2023-11-09
Payer: MEDICARE

## 2023-11-09 VITALS
TEMPERATURE: 98 F | BODY MASS INDEX: 32.86 KG/M2 | RESPIRATION RATE: 18 BRPM | DIASTOLIC BLOOD PRESSURE: 68 MMHG | WEIGHT: 178.56 LBS | HEART RATE: 76 BPM | HEIGHT: 62 IN | OXYGEN SATURATION: 98 % | SYSTOLIC BLOOD PRESSURE: 137 MMHG

## 2023-11-09 DIAGNOSIS — I10 PRIMARY HYPERTENSION: ICD-10-CM

## 2023-11-09 DIAGNOSIS — Z12.12 SCREENING FOR COLORECTAL CANCER: ICD-10-CM

## 2023-11-09 DIAGNOSIS — N39.41 URGE INCONTINENCE OF URINE: ICD-10-CM

## 2023-11-09 DIAGNOSIS — G47.33 OSA (OBSTRUCTIVE SLEEP APNEA): ICD-10-CM

## 2023-11-09 DIAGNOSIS — M85.80 OSTEOPENIA, UNSPECIFIED LOCATION: ICD-10-CM

## 2023-11-09 DIAGNOSIS — M22.2X1 PATELLOFEMORAL PAIN SYNDROME OF BOTH KNEES: ICD-10-CM

## 2023-11-09 DIAGNOSIS — E78.5 HYPERLIPIDEMIA, UNSPECIFIED HYPERLIPIDEMIA TYPE: ICD-10-CM

## 2023-11-09 DIAGNOSIS — Z12.11 SCREENING FOR COLORECTAL CANCER: ICD-10-CM

## 2023-11-09 DIAGNOSIS — K76.0 FATTY LIVER: ICD-10-CM

## 2023-11-09 DIAGNOSIS — I70.0 AORTIC ATHEROSCLEROSIS: ICD-10-CM

## 2023-11-09 DIAGNOSIS — E11.9 TYPE 2 DIABETES MELLITUS WITHOUT COMPLICATION, WITHOUT LONG-TERM CURRENT USE OF INSULIN: Primary | ICD-10-CM

## 2023-11-09 DIAGNOSIS — M22.2X2 PATELLOFEMORAL PAIN SYNDROME OF BOTH KNEES: ICD-10-CM

## 2023-11-09 LAB
ALBUMIN/CREAT UR: 22.9 UG/MG (ref 0–30)
BILIRUB SERPL-MCNC: NEGATIVE MG/DL
BILIRUB UR QL STRIP: NEGATIVE
BLOOD URINE, POC: NEGATIVE
CLARITY UR REFRACT.AUTO: CLEAR
CLARITY, POC UA: CLEAR
COLOR UR AUTO: COLORLESS
COLOR, POC UA: YELLOW
CREAT UR-MCNC: 48 MG/DL (ref 15–325)
GLUCOSE UR QL STRIP: NEGATIVE
GLUCOSE UR QL STRIP: NEGATIVE
HGB UR QL STRIP: NEGATIVE
KETONES UR QL STRIP: NEGATIVE
KETONES UR QL STRIP: NEGATIVE
LEUKOCYTE ESTERASE UR QL STRIP: ABNORMAL
LEUKOCYTE ESTERASE URINE, POC: NORMAL
MICROALBUMIN UR DL<=1MG/L-MCNC: 11 UG/ML
MICROSCOPIC COMMENT: NORMAL
NITRITE UR QL STRIP: NEGATIVE
NITRITE, POC UA: NEGATIVE
PH UR STRIP: 7 [PH] (ref 5–8)
PH, POC UA: 7
PROT UR QL STRIP: NEGATIVE
PROTEIN, POC: NEGATIVE
SP GR UR STRIP: 1.01 (ref 1–1.03)
SPECIFIC GRAVITY, POC UA: 1.01
SQUAMOUS #/AREA URNS AUTO: 1 /HPF
URN SPEC COLLECT METH UR: ABNORMAL
UROBILINOGEN, POC UA: NORMAL
WBC #/AREA URNS AUTO: 2 /HPF (ref 0–5)

## 2023-11-09 PROCEDURE — 3078F PR MOST RECENT DIASTOLIC BLOOD PRESSURE < 80 MM HG: ICD-10-PCS | Mod: HCNC,CPTII,S$GLB, | Performed by: INTERNAL MEDICINE

## 2023-11-09 PROCEDURE — 81002 POCT URINE DIPSTICK WITHOUT MICROSCOPE: ICD-10-PCS | Mod: HCNC,S$GLB,, | Performed by: INTERNAL MEDICINE

## 2023-11-09 PROCEDURE — 1101F PT FALLS ASSESS-DOCD LE1/YR: CPT | Mod: HCNC,CPTII,S$GLB, | Performed by: INTERNAL MEDICINE

## 2023-11-09 PROCEDURE — 99999 PR PBB SHADOW E&M-EST. PATIENT-LVL V: ICD-10-PCS | Mod: PBBFAC,HCNC,, | Performed by: INTERNAL MEDICINE

## 2023-11-09 PROCEDURE — 1126F AMNT PAIN NOTED NONE PRSNT: CPT | Mod: HCNC,CPTII,S$GLB, | Performed by: INTERNAL MEDICINE

## 2023-11-09 PROCEDURE — 1101F PR PT FALLS ASSESS DOC 0-1 FALLS W/OUT INJ PAST YR: ICD-10-PCS | Mod: HCNC,CPTII,S$GLB, | Performed by: INTERNAL MEDICINE

## 2023-11-09 PROCEDURE — 1159F MED LIST DOCD IN RCRD: CPT | Mod: HCNC,CPTII,S$GLB, | Performed by: INTERNAL MEDICINE

## 2023-11-09 PROCEDURE — 99214 OFFICE O/P EST MOD 30 MIN: CPT | Mod: HCNC,S$GLB,, | Performed by: INTERNAL MEDICINE

## 2023-11-09 PROCEDURE — 1159F PR MEDICATION LIST DOCUMENTED IN MEDICAL RECORD: ICD-10-PCS | Mod: HCNC,CPTII,S$GLB, | Performed by: INTERNAL MEDICINE

## 2023-11-09 PROCEDURE — 3078F DIAST BP <80 MM HG: CPT | Mod: HCNC,CPTII,S$GLB, | Performed by: INTERNAL MEDICINE

## 2023-11-09 PROCEDURE — 3075F SYST BP GE 130 - 139MM HG: CPT | Mod: HCNC,CPTII,S$GLB, | Performed by: INTERNAL MEDICINE

## 2023-11-09 PROCEDURE — 99214 PR OFFICE/OUTPT VISIT, EST, LEVL IV, 30-39 MIN: ICD-10-PCS | Mod: HCNC,S$GLB,, | Performed by: INTERNAL MEDICINE

## 2023-11-09 PROCEDURE — 1160F PR REVIEW ALL MEDS BY PRESCRIBER/CLIN PHARMACIST DOCUMENTED: ICD-10-PCS | Mod: HCNC,CPTII,S$GLB, | Performed by: INTERNAL MEDICINE

## 2023-11-09 PROCEDURE — 3288F FALL RISK ASSESSMENT DOCD: CPT | Mod: HCNC,CPTII,S$GLB, | Performed by: INTERNAL MEDICINE

## 2023-11-09 PROCEDURE — 3288F PR FALLS RISK ASSESSMENT DOCUMENTED: ICD-10-PCS | Mod: HCNC,CPTII,S$GLB, | Performed by: INTERNAL MEDICINE

## 2023-11-09 PROCEDURE — 82043 UR ALBUMIN QUANTITATIVE: CPT | Mod: HCNC | Performed by: INTERNAL MEDICINE

## 2023-11-09 PROCEDURE — 99999 PR PBB SHADOW E&M-EST. PATIENT-LVL V: CPT | Mod: PBBFAC,HCNC,, | Performed by: INTERNAL MEDICINE

## 2023-11-09 PROCEDURE — 3075F PR MOST RECENT SYSTOLIC BLOOD PRESS GE 130-139MM HG: ICD-10-PCS | Mod: HCNC,CPTII,S$GLB, | Performed by: INTERNAL MEDICINE

## 2023-11-09 PROCEDURE — 81001 URINALYSIS AUTO W/SCOPE: CPT | Mod: HCNC | Performed by: INTERNAL MEDICINE

## 2023-11-09 PROCEDURE — 81002 URINALYSIS NONAUTO W/O SCOPE: CPT | Mod: HCNC,S$GLB,, | Performed by: INTERNAL MEDICINE

## 2023-11-09 PROCEDURE — 1160F RVW MEDS BY RX/DR IN RCRD: CPT | Mod: HCNC,CPTII,S$GLB, | Performed by: INTERNAL MEDICINE

## 2023-11-09 PROCEDURE — 1126F PR PAIN SEVERITY QUANTIFIED, NO PAIN PRESENT: ICD-10-PCS | Mod: HCNC,CPTII,S$GLB, | Performed by: INTERNAL MEDICINE

## 2023-11-09 RX ORDER — DICLOFENAC SODIUM 10 MG/G
2 GEL TOPICAL 4 TIMES DAILY
Qty: 100 G | Refills: 1 | Status: SHIPPED | OUTPATIENT
Start: 2023-11-09

## 2023-11-09 RX ORDER — AZELASTINE 1 MG/ML
1 SPRAY, METERED NASAL 2 TIMES DAILY
Qty: 30 ML | Refills: 3 | Status: SHIPPED | OUTPATIENT
Start: 2023-11-09 | End: 2024-11-08

## 2023-11-09 NOTE — PROGRESS NOTES
I sent pt a my chart message -  I reviewed your URINE DIPSTICK - it showed some slight abnormalities not necessarily consistent with  a UTI so I will  send the specimen for further testing and treat if positive.     Dr. GALVAN

## 2023-11-10 RX ORDER — SULFAMETHOXAZOLE AND TRIMETHOPRIM 800; 160 MG/1; MG/1
1 TABLET ORAL 2 TIMES DAILY
Qty: 6 TABLET | Refills: 0 | Status: SHIPPED | OUTPATIENT
Start: 2023-11-10 | End: 2023-11-27

## 2023-11-10 NOTE — TELEPHONE ENCOUNTER
----- Message from Trinidad Reinoso sent at 11/10/2023  2:30 PM CST -----  Contact: [phone]  [email or web address]     Patient is calling for Medical Advice regarding: Back  lower pain  and Urinary  incontinence.     How long has patient had these symptoms:     Pharmacy name and phone#:   [id number] - [place] - [address]  [address]  [place] [address]  Phone: [phone] Fax: [phone]       Would like response via [employer]unte[employer]:  Phone call     Comments:

## 2023-11-10 NOTE — TELEPHONE ENCOUNTER
I sw pt. Her u/a came back. Did not reflex to culture. Pt reports worsening back pain and urgency than yesterday. States that by the time she makes it to the bathroom she is starting to urinate on herself. Yesterday you noted that we would treat if u/a was positive

## 2023-11-13 NOTE — PROGRESS NOTES
I sent pt a my chart message -  I was just checking to see how are you feeling since starting the Antibiotics?  Good news! I reviewed your labs and you had no evidence of microalbuminuria (small protein in your urine from your Diabetes).       Dr. GALVAN

## 2023-11-14 ENCOUNTER — CLINICAL SUPPORT (OUTPATIENT)
Dept: DIABETES | Facility: CLINIC | Age: 78
End: 2023-11-14
Payer: MEDICARE

## 2023-11-14 ENCOUNTER — LAB VISIT (OUTPATIENT)
Dept: LAB | Facility: OTHER | Age: 78
End: 2023-11-14
Attending: INTERNAL MEDICINE
Payer: MEDICARE

## 2023-11-14 VITALS — BODY MASS INDEX: 32.57 KG/M2 | WEIGHT: 177 LBS | HEIGHT: 62 IN

## 2023-11-14 DIAGNOSIS — E11.9 TYPE 2 DIABETES MELLITUS WITHOUT COMPLICATION, WITHOUT LONG-TERM CURRENT USE OF INSULIN: Primary | ICD-10-CM

## 2023-11-14 DIAGNOSIS — E11.9 TYPE 2 DIABETES MELLITUS WITHOUT COMPLICATION, WITHOUT LONG-TERM CURRENT USE OF INSULIN: ICD-10-CM

## 2023-11-14 LAB
ESTIMATED AVG GLUCOSE: 128 MG/DL (ref 68–131)
HBA1C MFR BLD: 6.1 % (ref 4–5.6)

## 2023-11-14 PROCEDURE — 36415 COLL VENOUS BLD VENIPUNCTURE: CPT | Mod: HCNC | Performed by: INTERNAL MEDICINE

## 2023-11-14 PROCEDURE — 99999 PR PBB SHADOW E&M-EST. PATIENT-LVL I: CPT | Mod: PBBFAC,HCNC,, | Performed by: DIETITIAN, REGISTERED

## 2023-11-14 PROCEDURE — 99999 PR PBB SHADOW E&M-EST. PATIENT-LVL I: ICD-10-PCS | Mod: PBBFAC,HCNC,, | Performed by: DIETITIAN, REGISTERED

## 2023-11-14 PROCEDURE — G0108 PR DIAB MANAGE TRN  PER INDIV: ICD-10-PCS | Mod: HCNC,S$GLB,, | Performed by: DIETITIAN, REGISTERED

## 2023-11-14 PROCEDURE — G0108 DIAB MANAGE TRN  PER INDIV: HCPCS | Mod: HCNC,S$GLB,, | Performed by: DIETITIAN, REGISTERED

## 2023-11-14 PROCEDURE — 83036 HEMOGLOBIN GLYCOSYLATED A1C: CPT | Mod: HCNC | Performed by: INTERNAL MEDICINE

## 2023-11-14 NOTE — PROGRESS NOTES
"Diabetes Care Specialist Progress Note  Author: Janelle Tinoco RD, CDE  Date: 11/14/2023    Program Intake  Reason for Diabetes Program Visit:: Post Program Follow-Up  Type of Intervention:: Individual  Type of Follow-Up: 3 month  Current diabetes risk level:: low  In the last 12 months, have you:: none    Lab Results   Component Value Date    HGBA1C 6.6 (H) 08/08/2023       Clinical    Weight: 80.3 kg (177 lb 0.5 oz)   Height: 5' 2" (157.5 cm)   Body mass index is 32.38 kg/m².    Current DM meds:  None - dm controlled with diet and exercise    Diabetes Self-Management Skills Assessment      Acute Complications  Patient is able to identify types of acute complications: No  Acute Complications Skills Assessment Completed: : Yes  Assessment indicates:: Instruction Needed  Area of need?: Yes    Chronic Complications  Patient can identify major chronic complications of diabetes.: yes  Stated chronic complications:: heart disease/heart attack, neuropathy/nerve damage, retinopathy, stroke  Patient can identify ways to prevent or delay diabetes complications.: yes  Stated ways to prevent complications:: avoiding smoking and other types of tobacco, checking feet daily and having routine comprehensive foot exams, controlling blood sugar, having regular diabetic eye exams, controlling cholesterol and triglycerides, healthy eating and regular activity, maintaining optimal blood glucose control  Patient is aware that having diabetes increases risk of heart disease?: Yes  Chronic Complications Skills Assessment Completed: : Yes  Assessment indicates:: Adequate understanding  Area of need?: No    Psychosocial/Coping  Patient can identify ways of coping with chronic disease.: yes  Patient-stated ways of coping with chronic disease:: support from loved ones, spiritual/Synagogue practices  Psychosocial/Coping Skills Assessment Completed: : Yes  Assessment indicates:: Adequate understanding  Area of need?: No      Assessment " Summary and Plan    Based on today's diabetes care assessment, the following areas of need were identified:          8/18/2023    12:01 AM   Social   Access to Mass Media/Tech No   Cognitive/Behavioral Health No   Culture/Mandaen No   Communication No   Health Literacy No            8/18/2023    12:01 AM   Clinical   Lab Compliance No            11/14/2023    12:01 AM   Diabetes Self-Management Skills   Acute Complications Yes -   Ed on hypoglycemia:  What is considered a low BG  Signs and symptoms  How to treat w/ 15/15 rule   Discussed appropriate treatment options: juice, soda, glucose tabs   Explained not likely to occur given not on dm meds  Ed on hyperglycemia:   What is considered dangerously high   Signs and symptoms discussed   Discussed increased risk r/t infection, stress, missed medications    Chronic Complications No   Psychosocial/Coping No          Today's interventions were provided through individual discussion, instruction, and written materials were provided.      Patient verbalized understanding of instruction and written materials.  Pt was able to return back demonstration of instructions today. Patient understood key points, needs reinforcement and further instruction.     Diabetes Self-Management Care Plan:    Today's Diabetes Self-Management Care Plan was developed with Jasmin's input. Noelorin has agreed to work toward the following goal(s) to improve his/her overall diabetes control.      Care Plan: Diabetes Management   Updates made since 10/15/2023 12:00 AM        Problem: Healthy Eating         Goal: Will increase serving of non-starchy vegetables at dinner and limit CHO to fist size portion.    Start Date: 8/18/2023   Expected End Date: 9/17/2023   This Visit's Progress: Met   Priority: Low   Barriers: No Barriers Identified   Note:    8/18/23 - Reports was diagnosed with dm in 1988 but was able to bring A1C down to normal through diet and exercise and was in remission from diabetes all  these years. She has continued to follow carb controlled diet avoiding sugary beverages, sweets, and limiting starch portions. She had been single for ~13 years and got  about a year ago. Since marriage, eating habits have changed significantly as  loves to cook.  has pre-diabetes but cooks with a lot of high sat fat (butters, creams, fried foods) and white pasta/rice. She has noticed wt gain. Reviewed sources of CHO, portion sizes using dry measuring cups and fist/hand size, choosing higher fiber/complex carbs, label reading, and balancing meals with lean protein and non-starchy veg. Discussed while fats do not directly raise BG, likely gaining weight from increased fat intake, which in turn increases insulin resistance. Encouraged choosing more heart-healthy unsat fats and limiting amounts. Reviewed some examples of meal planning using plate method.     11/14/23 - Has been eating more vegetables and cutting down on carb portions.  loves sweets and brings home pies, cookies, etc. She has been very mindful of only having a spoonful or small piece. Drinking only water or zero sugar drinks. Sometimes gets hungry between meals and limits snack to handful of nuts and a piece of fruit. Applauded excellent efforts. Has questions about meal planning - reviewed plate method.        Problem: Physical Activity and Exercise         Goal: Patient agrees to increase physical activity to a goal of 2-3 times per week for 60 minutes.    Start Date: 8/18/2023   Expected End Date: 9/17/2023   This Visit's Progress: Met   Priority: Medium   Barriers: No Barriers Identified   Note:    8/18/23 - Pt desires to lose weight and tone abs and glutes. Discussed how physical activity can reduce insulin resistance and have a lasting effect on BG. Reviewed benefits of increasing to 150 min per week cumulative and encouraged continuing mix of cardio and strength training. Pt is working with .      11/14/23 - Stopped working with  - Cost was not sustainable and felt she had learned enough of the routine from . Has continued to go to the gym at least 2, sometimes 3 times per week.  comes with her sometimes. She still goes on her own even if he has other plans. Encouraged continuing.       Task: Discussed role of physical activity on reducing insulin resistance and improvement in overall glycemic control. Completed 11/14/2023        Task: Discussed role of physical activity as it relates to weight loss Completed 11/14/2023        Problem: Blood Glucose Self-Monitoring         Goal: Patient agrees to check and record blood sugars 1-2 times per day.    Start Date: 8/18/2023   Expected End Date: 9/17/2023   This Visit's Progress: Met   Priority: High   Barriers: No Barriers Identified   Note:    8/18/23 - Started SMBG last night and appropriately kept BG log. Applauded efforts and encouraged continuing with testing 1-2 times per day.   Ed on importance of SMBG and goal BG ranges.  Ed on using BG trends to track progress of routine changes   Ed on when to call clinic with out of range patterns   Ed on how improved POC BG values translate to improved A1C - ed on current A1C and goal A1C and when next A1C is due      11/14/23 - Has continued monitoring consistently and beulah log book today. Readings are mostly staying in goal ranges. Occasionally, has a reading >200 after dinner. She has been able to identify dietary cause of higher reading and has helped her to be self-aware.          Follow Up Plan     Follow up if A1C worsens or annually.    Today's care plan and follow up schedule was discussed with patient.  Jasmin verbalized understanding of the care plan, goals, and agrees to follow up plan.        The patient was encouraged to communicate with his/her health care provider/physician and care team regarding his/her condition(s) and treatment.  I provided the patient with my  contact information today and encouraged to contact me via phone or [facility] Patient Portal as needed.     Length of Visit   Total Time: 30 Minutes

## 2023-11-15 ENCOUNTER — HOSPITAL ENCOUNTER (OUTPATIENT)
Dept: RADIOLOGY | Facility: OTHER | Age: 78
Discharge: HOME | End: 2023-11-15
Attending: INTERNAL MEDICINE
Payer: MEDICARE

## 2023-11-15 DIAGNOSIS — Z78.0 POSTMENOPAUSAL: ICD-10-CM

## 2023-11-15 PROCEDURE — 77080 DXA BONE DENSITY AXIAL: CPT | Mod: 26,HCNC,, | Performed by: RADIOLOGY

## 2023-11-15 PROCEDURE — 77080 DXA BONE DENSITY AXIAL: CPT | Mod: TC,HCNC

## 2023-11-15 PROCEDURE — 77080 DXA BONE DENSITY AXIAL SKELETON 1 OR MORE SITES: ICD-10-PCS | Mod: 26,HCNC,, | Performed by: RADIOLOGY

## 2023-11-16 NOTE — PROGRESS NOTES
I sent pt a my chart message -  I reviewed  your  DEXA/Bone Density which showed osteopenia. I recommend continue OTC Calcium max 1000 mg/d and Vit D3 0824-4467 units/d OTC.  In addition, I rec weight-bearing  exercise at least 30 minutes 3 times/wk and ideally 5 times/wk.  No specific intensity.  Walking is fine. I just a form of weight-bearing exercise you enjoy to facilitate long term compliance and benefit. We can repeat your DEXA in 2  yrs.    Dr. GALVAN

## 2023-11-20 ENCOUNTER — OFFICE VISIT (OUTPATIENT)
Dept: URGENT CARE | Facility: CLINIC | Age: 78
End: 2023-11-20
Payer: MEDICARE

## 2023-11-20 VITALS
OXYGEN SATURATION: 100 % | SYSTOLIC BLOOD PRESSURE: 133 MMHG | TEMPERATURE: 98 F | DIASTOLIC BLOOD PRESSURE: 69 MMHG | HEIGHT: 62 IN | HEART RATE: 66 BPM | WEIGHT: 176.56 LBS | RESPIRATION RATE: 16 BRPM | BODY MASS INDEX: 32.49 KG/M2

## 2023-11-20 DIAGNOSIS — U07.1 COVID-19 VIRUS DETECTED: ICD-10-CM

## 2023-11-20 DIAGNOSIS — R05.9 COUGH, UNSPECIFIED TYPE: ICD-10-CM

## 2023-11-20 DIAGNOSIS — R53.83 FATIGUE, UNSPECIFIED TYPE: ICD-10-CM

## 2023-11-20 DIAGNOSIS — U07.1 COVID: Primary | ICD-10-CM

## 2023-11-20 LAB
CTP QC/QA: YES
CTP QC/QA: YES
POC MOLECULAR INFLUENZA A AGN: NEGATIVE
POC MOLECULAR INFLUENZA B AGN: NEGATIVE
SARS-COV-2 AG RESP QL IA.RAPID: POSITIVE

## 2023-11-20 PROCEDURE — 87502 POCT INFLUENZA A/B MOLECULAR: ICD-10-PCS | Mod: QW,S$GLB,,

## 2023-11-20 PROCEDURE — 87502 INFLUENZA DNA AMP PROBE: CPT | Mod: QW,S$GLB,,

## 2023-11-20 PROCEDURE — 87811 SARS-COV-2 COVID19 W/OPTIC: CPT | Mod: QW,S$GLB,,

## 2023-11-20 PROCEDURE — 99214 PR OFFICE/OUTPT VISIT, EST, LEVL IV, 30-39 MIN: ICD-10-PCS | Mod: S$GLB,,,

## 2023-11-20 PROCEDURE — 87811 SARS CORONAVIRUS 2 ANTIGEN POCT, MANUAL READ: ICD-10-PCS | Mod: QW,S$GLB,,

## 2023-11-20 PROCEDURE — 99214 OFFICE O/P EST MOD 30 MIN: CPT | Mod: S$GLB,,,

## 2023-11-20 RX ORDER — BLOOD-GLUCOSE METER
EACH MISCELLANEOUS
COMMUNITY
Start: 2023-08-15

## 2023-11-20 RX ORDER — BENZONATATE 100 MG/1
100 CAPSULE ORAL 3 TIMES DAILY PRN
Qty: 30 CAPSULE | Refills: 0 | Status: SHIPPED | OUTPATIENT
Start: 2023-11-20 | End: 2023-12-01

## 2023-11-20 RX ORDER — LANCETS 33 GAUGE
EACH MISCELLANEOUS
COMMUNITY
Start: 2023-10-11

## 2023-11-20 RX ORDER — ZOSTER VACCINE RECOMBINANT, ADJUVANTED 50 MCG/0.5
KIT INTRAMUSCULAR
COMMUNITY
Start: 2023-08-15 | End: 2023-12-01

## 2023-11-20 NOTE — PROGRESS NOTES
"Subjective:      Patient ID: Jasmin Alfaro is a 78 y.o. female.    Vitals:  height is 5' 2" (1.575 m) and weight is 80.1 kg (176 lb 8.9 oz). Her oral temperature is 98.3 °F (36.8 °C). Her blood pressure is 133/69 and her pulse is 66. Her respiration is 16 and oxygen saturation is 100%.     Chief Complaint: Sinus Problem    Pt presents today w/ nasal congestion accompanied w/ non productive cough ; onset Friday 11/17/23. Pt c/o headache, hoarse voice , neck pain, fatigue , body aches, sinus pressure and swollen glands. Pt denies fever and nausea. Pt has hx hypertension. Pt tried otc HBP cold and flu;no relief.     Sinus Problem  This is a new problem. The current episode started in the past 7 days. The problem is unchanged. There has been no fever. Her pain is at a severity of 8/10. The pain is severe. Associated symptoms include congestion, coughing, headaches, a hoarse voice, neck pain, sinus pressure, sneezing and swollen glands. Pertinent negatives include no chills, diaphoresis, ear pain, shortness of breath or sore throat. Treatments tried: otc hbp cold and flu. The treatment provided no relief.       Constitution: Positive for fatigue. Negative for chills, sweating and fever.   HENT:  Positive for congestion, postnasal drip and sinus pressure. Negative for ear pain and sore throat.    Neck: Positive for neck pain.   Cardiovascular:  Negative for chest pain.   Respiratory:  Positive for cough. Negative for shortness of breath.    Gastrointestinal:  Positive for nausea. Negative for abdominal pain, vomiting and diarrhea.   Musculoskeletal:  Positive for muscle ache.   Allergic/Immunologic: Positive for sneezing.   Neurological:  Positive for headaches.      Objective:     Physical Exam   Constitutional: She is oriented to person, place, and time. She appears well-developed. She is cooperative.  Non-toxic appearance. She does not appear ill. No distress.   HENT:   Head: Normocephalic and atraumatic. "   Ears:   Right Ear: Hearing, tympanic membrane, external ear and ear canal normal.   Left Ear: Hearing, tympanic membrane, external ear and ear canal normal.   Nose: No mucosal edema, rhinorrhea, nasal deformity or congestion. No epistaxis. Right sinus exhibits maxillary sinus tenderness and frontal sinus tenderness. Left sinus exhibits maxillary sinus tenderness and frontal sinus tenderness.   Mouth/Throat: Uvula is midline, oropharynx is clear and moist and mucous membranes are normal. No trismus in the jaw. Normal dentition. No uvula swelling. No oropharyngeal exudate, posterior oropharyngeal edema or posterior oropharyngeal erythema.   Eyes: Conjunctivae and lids are normal. Pupils are equal, round, and reactive to light. No scleral icterus.   Neck: Trachea normal and phonation normal. Neck supple. No edema present. No erythema present. No neck rigidity present.   Cardiovascular: Normal rate, regular rhythm, normal heart sounds and normal pulses.   Pulmonary/Chest: Effort normal and breath sounds normal. No stridor. No respiratory distress. She has no decreased breath sounds. She has no wheezes. She has no rhonchi. She has no rales.   Abdominal: Normal appearance.   Musculoskeletal: Normal range of motion.         General: No deformity. Normal range of motion.   Lymphadenopathy:     She has cervical adenopathy.   Neurological: She is alert and oriented to person, place, and time. She exhibits normal muscle tone. Coordination normal.   Skin: Skin is warm, dry, intact, not diaphoretic and not pale. Capillary refill takes less than 2 seconds.   Psychiatric: Her speech is normal and behavior is normal. Judgment and thought content normal.   Nursing note and vitals reviewed.      Assessment:     1. COVID    2. Fatigue, unspecified type    3. Cough, unspecified type        Plan:     Results for orders placed or performed in visit on 11/20/23   POCT Influenza A/B MOLECULAR   Result Value Ref Range    POC Molecular  Influenza A Ag Negative Negative, Not Reported    POC Molecular Influenza B Ag Negative Negative, Not Reported     Acceptable Yes    SARS Coronavirus 2 Antigen, POCT Manual Read   Result Value Ref Range    SARS Coronavirus 2 Antigen Positive (A) Negative     Acceptable Yes      Covid risk score: 4     COVID  -     nirmatrelvir-ritonavir 150-100 mg DsPk; Take 2 tablets by mouth 2 (two) times daily for 5 days. Each dose contains 1 nirmatrelvir (pink tablet) and 1 ritonavir (white tablet). Take both tablets together  Dispense: 20 tablet; Refill: 0    Fatigue, unspecified type  -     POCT Influenza A/B MOLECULAR    Cough, unspecified type  -     SARS Coronavirus 2 Antigen, POCT Manual Read  -     benzonatate (TESSALON) 100 MG capsule; Take 1 capsule (100 mg total) by mouth 3 (three) times daily as needed for Cough.  Dispense: 30 capsule; Refill: 0            Discussed results/diagnosis/plan with patient in clinic. Strict precautions given to patient to monitor for worsening signs and symptoms. Advised to follow up with PCP or specialist.  Explained side effects of medications prescribed with patient and informed him/her to discontinue use if he/she has any side effects and to inform UC or PCP if this occurs. All questions answered. Strict ED verses clinic return precautions stressed and given in depth. Advised if symptoms worsens of fail to improve he/she should go to the Emergency Room. Discharge and follow-up instructions given verbally/printed with the patient who expressed understanding and willingness to comply with my recommendations. Patient voiced understanding and in agreement with current treatment plan. Patient exits the exam room in no acute distress. Conversant and engaged during discharge discussion, verbalized understanding.

## 2023-11-20 NOTE — PATIENT INSTRUCTIONS
You are COVID positive today. Take anti-viral (paxlovid) twice a day for the next 5 days to decrease symptoms. Side/adverse effects discussed.   -----STOP taking crestor x 10 days  ----- Monitor your BP twice a daily. Take half of amlodipine if low BP occurs & notify your PCP    The CDC recommends isolating at home 5 days from symptom start then wear a mask in public for 5 days afterwards.     Try tessalon perles as directed during the day for your cough. It will help numb the back of your throat so you do not have the urge to cough.      Try a decongestant and corticosteroid nasal spray like flonase for the next few days for sinus relief. Initial: 2 sprays in each nostril once daily for 1 week. Reduce to 1 spray in each nostril once per day. Stop taking if you develop a nose bleed. Nasal saline spray can be used together with flonase to help moisten nostrils. An antihistamine like zyrtec, claritin, allegra can also be helpful for sinus relief and will help dry nasal passages.     Regular (Guaifenesin) Mucinex 1200 mg twice per day for 10 days can help thin secretions for better clearance. Drink plenty of fluids with this.     Honey is a natural cough suppressant.     If you do have Hypertension or palpitations, it is safe to take Coricidin HBP (multi-symptom flu) for relief of sinus symptoms.  Try DASH diet to help lower BP and buy a blood pressure cuff for home monitoring. Check blood pressure at least 2 times per day and create a log. Avoid eating foods that are high in salt. Eat more foods with potassium, magnesium and calcium which will help dilate your vessels and decrease your BP.     Warm tea/warm liquids will help soothe the back of your throat. Warm water salt gurgles can also be helpful. A dry throat will cause pain. Make sure to stay hydrated. Water and pedilyte are the best to drink. Neti pot irrigation, humidifier in your room, avoiding fans, warm compresses to face, eating/drinking hot soups, hot  shower before bedtime can help.     The recommended daily fluid intake for women is 2.7 liters (five 16 oz bottles).      Alternate Tylenol and ibuprofen every 4 hours as needed for fever and body aches.  Please take NSAIDs with a full glass of water and food to avoid GI upset.      Please only use over the counter cough and cold medications for 3-5 days at a time to avoid rebound symptoms.     Getting plenty of rest can aid in a faster recovery of illnesses.     Please follow-up with your primary care provider or return to the clinic if not better/worsening symptoms in 1 week.     Report to the ER if you have chest pain, shortness of breath, palpitations.

## 2023-11-27 ENCOUNTER — TELEPHONE (OUTPATIENT)
Dept: ENDOSCOPY | Facility: HOSPITAL | Age: 78
End: 2023-11-27

## 2023-11-27 ENCOUNTER — HOSPITAL ENCOUNTER (OUTPATIENT)
Facility: HOSPITAL | Age: 78
Discharge: HOME | End: 2023-11-28
Attending: STUDENT IN AN ORGANIZED HEALTH CARE EDUCATION/TRAINING PROGRAM | Admitting: STUDENT IN AN ORGANIZED HEALTH CARE EDUCATION/TRAINING PROGRAM
Payer: MEDICARE

## 2023-11-27 ENCOUNTER — CLINICAL SUPPORT (OUTPATIENT)
Dept: ENDOSCOPY | Facility: HOSPITAL | Age: 78
End: 2023-11-27
Attending: INTERNAL MEDICINE
Payer: MEDICARE

## 2023-11-27 ENCOUNTER — OFFICE VISIT (OUTPATIENT)
Dept: URGENT CARE | Facility: CLINIC | Age: 78
End: 2023-11-27
Payer: MEDICARE

## 2023-11-27 VITALS
RESPIRATION RATE: 16 BRPM | TEMPERATURE: 98 F | HEART RATE: 61 BPM | WEIGHT: 176.56 LBS | DIASTOLIC BLOOD PRESSURE: 65 MMHG | SYSTOLIC BLOOD PRESSURE: 132 MMHG | BODY MASS INDEX: 32.49 KG/M2 | HEIGHT: 62 IN | OXYGEN SATURATION: 99 %

## 2023-11-27 DIAGNOSIS — R91.1 PULMONARY NODULE: ICD-10-CM

## 2023-11-27 DIAGNOSIS — Z12.11 SCREENING FOR COLORECTAL CANCER: ICD-10-CM

## 2023-11-27 DIAGNOSIS — E78.49 OTHER HYPERLIPIDEMIA: ICD-10-CM

## 2023-11-27 DIAGNOSIS — I70.0 AORTIC ATHEROSCLEROSIS: ICD-10-CM

## 2023-11-27 DIAGNOSIS — R91.1 PULMONARY NODULE, RIGHT: ICD-10-CM

## 2023-11-27 DIAGNOSIS — M54.9 UPPER BACK PAIN: ICD-10-CM

## 2023-11-27 DIAGNOSIS — Z12.12 SCREENING FOR COLORECTAL CANCER: ICD-10-CM

## 2023-11-27 DIAGNOSIS — U07.1 COVID: ICD-10-CM

## 2023-11-27 DIAGNOSIS — I10 PRIMARY HYPERTENSION: ICD-10-CM

## 2023-11-27 DIAGNOSIS — I35.8 MILD AORTIC VALVE SCLEROSIS: ICD-10-CM

## 2023-11-27 DIAGNOSIS — N18.31 CHRONIC KIDNEY DISEASE, STAGE 3A: ICD-10-CM

## 2023-11-27 DIAGNOSIS — R07.89 OTHER CHEST PAIN: Primary | ICD-10-CM

## 2023-11-27 DIAGNOSIS — R07.9 CHEST PAIN: Primary | ICD-10-CM

## 2023-11-27 LAB
ALBUMIN SERPL BCP-MCNC: 4 G/DL (ref 3.5–5.2)
ALP SERPL-CCNC: 88 U/L (ref 55–135)
ALT SERPL W/O P-5'-P-CCNC: 28 U/L (ref 10–44)
ANION GAP SERPL CALC-SCNC: 10 MMOL/L (ref 8–16)
AST SERPL-CCNC: 29 U/L (ref 10–40)
BASOPHILS # BLD AUTO: 0.04 K/UL (ref 0–0.2)
BASOPHILS NFR BLD: 0.7 % (ref 0–1.9)
BILIRUB SERPL-MCNC: 0.3 MG/DL (ref 0.1–1)
BNP SERPL-MCNC: 177 PG/ML (ref 0–99)
BUN SERPL-MCNC: 19 MG/DL (ref 8–23)
CALCIUM SERPL-MCNC: 10.2 MG/DL (ref 8.7–10.5)
CHLORIDE SERPL-SCNC: 99 MMOL/L (ref 95–110)
CO2 SERPL-SCNC: 27 MMOL/L (ref 23–29)
CREAT SERPL-MCNC: 1 MG/DL (ref 0.5–1.4)
D DIMER PPP IA.FEU-MCNC: 0.9 MG/L FEU
DIFFERENTIAL METHOD: NORMAL
EOSINOPHIL # BLD AUTO: 0.1 K/UL (ref 0–0.5)
EOSINOPHIL NFR BLD: 1.7 % (ref 0–8)
ERYTHROCYTE [DISTWIDTH] IN BLOOD BY AUTOMATED COUNT: 13.1 % (ref 11.5–14.5)
EST. GFR  (NO RACE VARIABLE): 58 ML/MIN/1.73 M^2
GLUCOSE SERPL-MCNC: 133 MG/DL (ref 70–110)
HCT VFR BLD AUTO: 42 % (ref 37–48.5)
HGB BLD-MCNC: 14.1 G/DL (ref 12–16)
IMM GRANULOCYTES # BLD AUTO: 0.01 K/UL (ref 0–0.04)
IMM GRANULOCYTES NFR BLD AUTO: 0.2 % (ref 0–0.5)
LYMPHOCYTES # BLD AUTO: 1.7 K/UL (ref 1–4.8)
LYMPHOCYTES NFR BLD: 28.8 % (ref 18–48)
MCH RBC QN AUTO: 30.3 PG (ref 27–31)
MCHC RBC AUTO-ENTMCNC: 33.6 G/DL (ref 32–36)
MCV RBC AUTO: 90 FL (ref 82–98)
MONOCYTES # BLD AUTO: 0.4 K/UL (ref 0.3–1)
MONOCYTES NFR BLD: 7.4 % (ref 4–15)
NEUTROPHILS # BLD AUTO: 3.6 K/UL (ref 1.8–7.7)
NEUTROPHILS NFR BLD: 61.2 % (ref 38–73)
NRBC BLD-RTO: 0 /100 WBC
PLATELET # BLD AUTO: 206 K/UL (ref 150–450)
PMV BLD AUTO: 10.9 FL (ref 9.2–12.9)
POC CARDIAC TROPONIN I: 0.09 NG/ML (ref 0–0.08)
POTASSIUM SERPL-SCNC: 3.5 MMOL/L (ref 3.5–5.1)
PROT SERPL-MCNC: 8.2 G/DL (ref 6–8.4)
RBC # BLD AUTO: 4.65 M/UL (ref 4–5.4)
SAMPLE: ABNORMAL
SODIUM SERPL-SCNC: 136 MMOL/L (ref 136–145)
TROPONIN I SERPL DL<=0.01 NG/ML-MCNC: 0.01 NG/ML (ref 0–0.03)
TROPONIN I SERPL DL<=0.01 NG/ML-MCNC: <0.006 NG/ML (ref 0–0.03)
WBC # BLD AUTO: 5.91 K/UL (ref 3.9–12.7)

## 2023-11-27 PROCEDURE — 99285 EMERGENCY DEPT VISIT HI MDM: CPT | Mod: 25,HCNC

## 2023-11-27 PROCEDURE — 93010 EKG 12-LEAD: ICD-10-PCS | Mod: HCNC,,, | Performed by: INTERNAL MEDICINE

## 2023-11-27 PROCEDURE — 93010 ELECTROCARDIOGRAM REPORT: CPT | Mod: HCNC,,, | Performed by: INTERNAL MEDICINE

## 2023-11-27 PROCEDURE — 99214 OFFICE O/P EST MOD 30 MIN: CPT | Mod: S$GLB,,, | Performed by: FAMILY MEDICINE

## 2023-11-27 PROCEDURE — 80053 COMPREHEN METABOLIC PANEL: CPT | Mod: HCNC | Performed by: EMERGENCY MEDICINE

## 2023-11-27 PROCEDURE — 93010 EKG 12-LEAD: ICD-10-PCS | Mod: S$GLB,,, | Performed by: INTERNAL MEDICINE

## 2023-11-27 PROCEDURE — G0378 HOSPITAL OBSERVATION PER HR: HCPCS | Mod: HCNC

## 2023-11-27 PROCEDURE — 99214 PR OFFICE/OUTPT VISIT, EST, LEVL IV, 30-39 MIN: ICD-10-PCS | Mod: S$GLB,,, | Performed by: FAMILY MEDICINE

## 2023-11-27 PROCEDURE — 93005 ELECTROCARDIOGRAM TRACING: CPT | Mod: HCNC

## 2023-11-27 PROCEDURE — 25500020 PHARM REV CODE 255: Mod: HCNC | Performed by: STUDENT IN AN ORGANIZED HEALTH CARE EDUCATION/TRAINING PROGRAM

## 2023-11-27 PROCEDURE — 93010 ELECTROCARDIOGRAM REPORT: CPT | Mod: S$GLB,,, | Performed by: INTERNAL MEDICINE

## 2023-11-27 PROCEDURE — 84484 ASSAY OF TROPONIN QUANT: CPT | Mod: HCNC | Performed by: EMERGENCY MEDICINE

## 2023-11-27 PROCEDURE — 85025 COMPLETE CBC W/AUTO DIFF WBC: CPT | Mod: HCNC | Performed by: EMERGENCY MEDICINE

## 2023-11-27 PROCEDURE — 84484 ASSAY OF TROPONIN QUANT: CPT | Mod: 91,HCNC | Performed by: PHYSICIAN ASSISTANT

## 2023-11-27 PROCEDURE — 85379 FIBRIN DEGRADATION QUANT: CPT | Mod: HCNC | Performed by: PHYSICIAN ASSISTANT

## 2023-11-27 PROCEDURE — 93005 EKG 12-LEAD: ICD-10-PCS | Mod: S$GLB,,, | Performed by: FAMILY MEDICINE

## 2023-11-27 PROCEDURE — 93005 ELECTROCARDIOGRAM TRACING: CPT | Mod: S$GLB,,, | Performed by: FAMILY MEDICINE

## 2023-11-27 PROCEDURE — 83880 ASSAY OF NATRIURETIC PEPTIDE: CPT | Mod: HCNC | Performed by: EMERGENCY MEDICINE

## 2023-11-27 RX ORDER — ATORVASTATIN CALCIUM 20 MG/1
20 TABLET, FILM COATED ORAL DAILY
Status: DISCONTINUED | OUTPATIENT
Start: 2023-11-28 | End: 2023-11-28 | Stop reason: HOSPADM

## 2023-11-27 RX ORDER — HYDROCHLOROTHIAZIDE 25 MG/1
25 TABLET ORAL DAILY
Status: DISCONTINUED | OUTPATIENT
Start: 2023-11-28 | End: 2023-11-28 | Stop reason: HOSPADM

## 2023-11-27 RX ORDER — ATENOLOL 25 MG/1
50 TABLET ORAL DAILY
Status: DISCONTINUED | OUTPATIENT
Start: 2023-11-28 | End: 2023-11-28 | Stop reason: HOSPADM

## 2023-11-27 RX ORDER — NAPROXEN SODIUM 220 MG/1
243 TABLET, FILM COATED ORAL
Status: COMPLETED | OUTPATIENT
Start: 2023-11-27 | End: 2023-11-27

## 2023-11-27 RX ORDER — AMLODIPINE BESYLATE 10 MG/1
10 TABLET ORAL DAILY
Status: DISCONTINUED | OUTPATIENT
Start: 2023-11-28 | End: 2023-11-28 | Stop reason: HOSPADM

## 2023-11-27 RX ORDER — ONDANSETRON 2 MG/ML
4 INJECTION INTRAMUSCULAR; INTRAVENOUS EVERY 8 HOURS PRN
Status: DISCONTINUED | OUTPATIENT
Start: 2023-11-28 | End: 2023-11-28 | Stop reason: HOSPADM

## 2023-11-27 RX ORDER — MAG HYDROX/ALUMINUM HYD/SIMETH 200-200-20
30 SUSPENSION, ORAL (FINAL DOSE FORM) ORAL
Status: DISCONTINUED | OUTPATIENT
Start: 2023-11-28 | End: 2023-11-28 | Stop reason: HOSPADM

## 2023-11-27 RX ORDER — NITROGLYCERIN 0.4 MG/1
0.4 TABLET SUBLINGUAL EVERY 5 MIN PRN
Status: DISCONTINUED | OUTPATIENT
Start: 2023-11-27 | End: 2023-11-28 | Stop reason: HOSPADM

## 2023-11-27 RX ORDER — NAPROXEN SODIUM 220 MG/1
81 TABLET, FILM COATED ORAL DAILY
Status: DISCONTINUED | OUTPATIENT
Start: 2023-11-28 | End: 2023-11-28 | Stop reason: HOSPADM

## 2023-11-27 RX ORDER — SUCRALFATE 1 G/10ML
1 SUSPENSION ORAL EVERY 6 HOURS
Status: DISCONTINUED | OUTPATIENT
Start: 2023-11-28 | End: 2023-11-28 | Stop reason: HOSPADM

## 2023-11-27 RX ORDER — ACETAMINOPHEN 325 MG/1
650 TABLET ORAL EVERY 8 HOURS PRN
Status: DISCONTINUED | OUTPATIENT
Start: 2023-11-28 | End: 2023-11-28 | Stop reason: HOSPADM

## 2023-11-27 RX ORDER — TALC
6 POWDER (GRAM) TOPICAL NIGHTLY PRN
Status: DISCONTINUED | OUTPATIENT
Start: 2023-11-28 | End: 2023-11-28 | Stop reason: HOSPADM

## 2023-11-27 RX ORDER — SODIUM CHLORIDE 0.9 % (FLUSH) 0.9 %
10 SYRINGE (ML) INJECTION
Status: DISCONTINUED | OUTPATIENT
Start: 2023-11-28 | End: 2023-11-28 | Stop reason: HOSPADM

## 2023-11-27 RX ADMIN — IOHEXOL 75 ML: 350 INJECTION, SOLUTION INTRAVENOUS at 07:11

## 2023-11-27 RX ADMIN — NAPROXEN SODIUM 243 MG: 220 TABLET, FILM COATED ORAL at 03:11

## 2023-11-27 NOTE — Clinical Note
Diagnosis: Chest pain [866521]   Future Attending Provider: HERMINIO CRESPO [[id number]]   Admitting Provider:: HERMINIO CRESPO [[id number]]   Special Needs:: No Special Needs [1]

## 2023-11-27 NOTE — TELEPHONE ENCOUNTER
Called patient for PAT appointment to schedule colonoscopy. No availability on schedule for Karthik Francis at this time. New PAT appointment scheduled to contact patient when April schedule is available.

## 2023-11-27 NOTE — PROGRESS NOTES
"Subjective:      Patient ID: Jasmin Alfaro is a 78 y.o. female.    Vitals:  height is 5' 2" (1.575 m) and weight is 80.1 kg (176 lb 9.4 oz). Her oral temperature is 98.4 °F (36.9 °C). Her blood pressure is 132/65 and her pulse is 61. Her respiration is 16 and oxygen saturation is 99%.     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  She states back pain is constant in the chest pain is coming and going.  Worse with exertion.  Better with lying down.  She thought it was gas and has taken Prilosec and other GI/ acid reducers without alleviation.  Describes pain as 7/10.  Pressure-like.        Back Pain    ROS   See HPI for pertinent positives and negatives    Objective:     Physical Exam  Constitutional: Pt oriented to person, place, and time.  Non-toxic appearance.   Patient does not appear ill. No distress. normal  HENT: No icterus or facial swelling appreciated  Head: Normocephalic and atraumatic.   Nose: No congestion.   Pulmonary/Chest: Effort normal. No stridor. No respiratory distress.   CV: s1/s2 RRR, no murmurs  Abdominal: Normal appearance. Abdomen exhibits no distension.   Musculoskeletal:         General: No swelling.   Neurological: no focal deficit. " Patient is alert and oriented to person, place, and time.   Skin: Skin is not diaphoretic and not pale. no jaundice  Psychiatric: Patients behavior is normal. Mood, judgment and thought content normal.     Assessment:     1. Other chest pain    2. Other hyperlipidemia    3. Primary hypertension    4. COVID    5. Chronic kidney disease, stage 3a    6. Aortic atherosclerosis        Plan:       Other chest pain  -     IN OFFICE EKG 12-LEAD (to Muse)       aspirin chewable tablet 243 mg    Other hyperlipidemia    Primary hypertension    COVID    Chronic kidney disease, stage 3a    Aortic atherosclerosis      EKG sinus rhythm with PACs and a septal infarct age undetermined.  There are no previous ECGs to compare to.      Her vital signs within normal limits and patient hemodynamically stable at the time of discharge by private car to ED for further eval and mgmt of retrosternal chest pain     ASA 243mg given in clinic (pt had already taken 81mg earlier in day)

## 2023-11-27 NOTE — PATIENT INSTRUCTIONS
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  She states back pain is constant in the chest pain is coming and going.  Worse with exertion.  Better with lying down.  She thought it was gas and has taken Prilosec and other GI/ acid reducers without alleviation.  Describes pain as 7/10.  Pressure-like.      EKG sinus rhythm with PACs and a septal infarct age undetermined.  There are no previous ECGs to compare to.      Her vital signs within normal limits and patient hemodynamically stable at the time of discharge by private car to ED for further eval and mgmt of retrosternal chest pain

## 2023-11-27 NOTE — FIRST PROVIDER EVALUATION
"Medical screening examination initiated.  I have conducted a focused provider triage encounter, findings are as follows:    Brief history of present illness:  CP, dx'd with covid 11/20, "I feel better covid-wise"    There were no vitals filed for this visit.    Pertinent physical exam:  nontoxic    Brief workup plan:  CP order panel    Preliminary workup initiated; this workup will be continued and followed by the physician or advanced practice provider that is assigned to the patient when roomed.  "

## 2023-11-27 NOTE — ED TRIAGE NOTES
The patient endorsees chest pain radiating to the shoulder starting Friday (5 out of 10) . The patient states that the pain has been constant. The patient states that the pain gets worse once she moves. The patient is also covid positive one week ago. The patient also endorses HA.

## 2023-11-28 VITALS
DIASTOLIC BLOOD PRESSURE: 66 MMHG | SYSTOLIC BLOOD PRESSURE: 130 MMHG | WEIGHT: 176 LBS | RESPIRATION RATE: 18 BRPM | HEART RATE: 69 BPM | HEIGHT: 62 IN | BODY MASS INDEX: 32.39 KG/M2 | OXYGEN SATURATION: 98 % | TEMPERATURE: 98 F

## 2023-11-28 LAB
ALBUMIN SERPL BCP-MCNC: 3.6 G/DL (ref 3.5–5.2)
ALP SERPL-CCNC: 83 U/L (ref 55–135)
ALT SERPL W/O P-5'-P-CCNC: 28 U/L (ref 10–44)
ANION GAP SERPL CALC-SCNC: 10 MMOL/L (ref 8–16)
ASCENDING AORTA: 3.41 CM
AST SERPL-CCNC: 28 U/L (ref 10–40)
BASOPHILS # BLD AUTO: 0.04 K/UL (ref 0–0.2)
BASOPHILS NFR BLD: 0.7 % (ref 0–1.9)
BILIRUB SERPL-MCNC: 0.2 MG/DL (ref 0.1–1)
BSA FOR ECHO PROCEDURE: 1.87 M2
BUN SERPL-MCNC: 19 MG/DL (ref 8–23)
CALCIUM SERPL-MCNC: 9.7 MG/DL (ref 8.7–10.5)
CHLORIDE SERPL-SCNC: 102 MMOL/L (ref 95–110)
CO2 SERPL-SCNC: 25 MMOL/L (ref 23–29)
CREAT SERPL-MCNC: 0.9 MG/DL (ref 0.5–1.4)
CV ECHO LV RWT: 0.48 CM
CV STRESS BASE HR: 79 BPM
DIASTOLIC BLOOD PRESSURE: 72 MMHG
DIFFERENTIAL METHOD: ABNORMAL
DOP CALC LVOT AREA: 3.8 CM2
DOP CALC LVOT DIAMETER: 2.19 CM
DOP CALC LVOT PEAK VEL: 0.92 M/S
DOP CALC LVOT STROKE VOLUME: 82.04 CM3
DOP CALCLVOT PEAK VEL VTI: 21.79 CM
E WAVE DECELERATION TIME: 165.9 MSEC
E/A RATIO: 0.76
E/E' RATIO: 8 M/S
ECHO LV POSTERIOR WALL: 0.88 CM (ref 0.6–1.1)
EOSINOPHIL # BLD AUTO: 0.1 K/UL (ref 0–0.5)
EOSINOPHIL NFR BLD: 2.2 % (ref 0–8)
ERYTHROCYTE [DISTWIDTH] IN BLOOD BY AUTOMATED COUNT: 12.8 % (ref 11.5–14.5)
EST. GFR  (NO RACE VARIABLE): >60 ML/MIN/1.73 M^2
FRACTIONAL SHORTENING: 41 % (ref 28–44)
GLUCOSE SERPL-MCNC: 171 MG/DL (ref 70–110)
HCT VFR BLD AUTO: 39 % (ref 37–48.5)
HGB BLD-MCNC: 13.7 G/DL (ref 12–16)
IMM GRANULOCYTES # BLD AUTO: 0.01 K/UL (ref 0–0.04)
IMM GRANULOCYTES NFR BLD AUTO: 0.2 % (ref 0–0.5)
INTERVENTRICULAR SEPTUM: 0.88 CM (ref 0.6–1.1)
LA MAJOR: 4.55 CM
LA MINOR: 5.01 CM
LA WIDTH: 4.3 CM
LEFT ATRIUM SIZE: 3.44 CM
LEFT ATRIUM VOLUME INDEX MOD: 33 ML/M2
LEFT ATRIUM VOLUME INDEX: 33.1 ML/M2
LEFT ATRIUM VOLUME MOD: 59.64 CM3
LEFT ATRIUM VOLUME: 59.96 CM3
LEFT INTERNAL DIMENSION IN SYSTOLE: 2.17 CM (ref 2.1–4)
LEFT VENTRICLE DIASTOLIC VOLUME INDEX: 31.56 ML/M2
LEFT VENTRICLE DIASTOLIC VOLUME: 57.13 ML
LEFT VENTRICLE MASS INDEX: 51 G/M2
LEFT VENTRICLE SYSTOLIC VOLUME INDEX: 8.7 ML/M2
LEFT VENTRICLE SYSTOLIC VOLUME: 15.74 ML
LEFT VENTRICULAR INTERNAL DIMENSION IN DIASTOLE: 3.67 CM (ref 3.5–6)
LEFT VENTRICULAR MASS: 92.68 G
LV LATERAL E/E' RATIO: 7.11 M/S
LV SEPTAL E/E' RATIO: 9.14 M/S
LYMPHOCYTES # BLD AUTO: 1.6 K/UL (ref 1–4.8)
LYMPHOCYTES NFR BLD: 27.5 % (ref 18–48)
MCH RBC QN AUTO: 31.2 PG (ref 27–31)
MCHC RBC AUTO-ENTMCNC: 35.1 G/DL (ref 32–36)
MCV RBC AUTO: 89 FL (ref 82–98)
MONOCYTES # BLD AUTO: 0.6 K/UL (ref 0.3–1)
MONOCYTES NFR BLD: 9.6 % (ref 4–15)
MV A" WAVE DURATION": 13.13 MSEC
MV PEAK A VEL: 0.84 M/S
MV PEAK E VEL: 0.64 M/S
MV STENOSIS PRESSURE HALF TIME: 48.11 MS
MV VALVE AREA P 1/2 METHOD: 4.57 CM2
NEUTROPHILS # BLD AUTO: 3.5 K/UL (ref 1.8–7.7)
NEUTROPHILS NFR BLD: 59.8 % (ref 38–73)
NRBC BLD-RTO: 0 /100 WBC
OHS CV CPX 1 MINUTE RECOVERY HEART RATE: 120 BPM
OHS CV CPX 85 PERCENT MAX PREDICTED HEART RATE MALE: 122
OHS CV CPX MAX PREDICTED HEART RATE: 143
OHS CV CPX PATIENT IS FEMALE: 1
OHS CV CPX PATIENT IS MALE: 0
OHS CV CPX PEAK DIASTOLIC BLOOD PRESSURE: 50 MMHG
OHS CV CPX PEAK HEAR RATE: 123 BPM
OHS CV CPX PEAK RATE PRESSURE PRODUCT: NORMAL
OHS CV CPX PEAK SYSTOLIC BLOOD PRESSURE: 133 MMHG
OHS CV CPX PERCENT MAX PREDICTED HEART RATE ACHIEVED: 89
OHS CV CPX RATE PRESSURE PRODUCT PRESENTING: NORMAL
OHS CV INITIAL DOSE: 10 MCG/KG/MIN
OHS CV PEAK DOSE: 40 MCG/KG/MIN
PLATELET # BLD AUTO: 181 K/UL (ref 150–450)
PMV BLD AUTO: 12 FL (ref 9.2–12.9)
POTASSIUM SERPL-SCNC: 3.4 MMOL/L (ref 3.5–5.1)
PROT SERPL-MCNC: 7.4 G/DL (ref 6–8.4)
PULM VEIN S/D RATIO: 2.07
PV PEAK D VEL: 0.3 M/S
PV PEAK S VEL: 0.62 M/S
RA MAJOR: 5.31 CM
RA PRESSURE ESTIMATED: 3 MMHG
RA WIDTH: 2.2 CM
RBC # BLD AUTO: 4.39 M/UL (ref 4–5.4)
RIGHT VENTRICULAR END-DIASTOLIC DIMENSION: 2.28 CM
SINUS: 2.63 CM
SODIUM SERPL-SCNC: 137 MMOL/L (ref 136–145)
STJ: 2.6 CM
SYSTOLIC BLOOD PRESSURE: 139 MMHG
TDI LATERAL: 0.09 M/S
TDI SEPTAL: 0.07 M/S
TDI: 0.08 M/S
TRICUSPID ANNULAR PLANE SYSTOLIC EXCURSION: 2.48 CM
TROPONIN I SERPL DL<=0.01 NG/ML-MCNC: <0.006 NG/ML (ref 0–0.03)
TROPONIN I SERPL DL<=0.01 NG/ML-MCNC: <0.006 NG/ML (ref 0–0.03)
WBC # BLD AUTO: 5.92 K/UL (ref 3.9–12.7)
Z-SCORE OF LEFT VENTRICULAR DIMENSION IN END DIASTOLE: -3.11
Z-SCORE OF LEFT VENTRICULAR DIMENSION IN END SYSTOLE: -2.8

## 2023-11-28 PROCEDURE — 84484 ASSAY OF TROPONIN QUANT: CPT | Mod: HCNC | Performed by: PHYSICIAN ASSISTANT

## 2023-11-28 PROCEDURE — 25000003 PHARM REV CODE 250: Mod: HCNC | Performed by: PHYSICIAN ASSISTANT

## 2023-11-28 PROCEDURE — 25500020 PHARM REV CODE 255: Mod: HCNC | Performed by: STUDENT IN AN ORGANIZED HEALTH CARE EDUCATION/TRAINING PROGRAM

## 2023-11-28 PROCEDURE — 63600175 PHARM REV CODE 636 W HCPCS: Mod: HCNC | Performed by: STUDENT IN AN ORGANIZED HEALTH CARE EDUCATION/TRAINING PROGRAM

## 2023-11-28 PROCEDURE — G0378 HOSPITAL OBSERVATION PER HR: HCPCS | Mod: HCNC

## 2023-11-28 PROCEDURE — 80053 COMPREHEN METABOLIC PANEL: CPT | Mod: HCNC | Performed by: PHYSICIAN ASSISTANT

## 2023-11-28 PROCEDURE — 85025 COMPLETE CBC W/AUTO DIFF WBC: CPT | Mod: HCNC | Performed by: PHYSICIAN ASSISTANT

## 2023-11-28 RX ORDER — DOBUTAMINE HYDROCHLORIDE 400 MG/100ML
10 INJECTION INTRAVENOUS
Status: COMPLETED | OUTPATIENT
Start: 2023-11-28 | End: 2023-11-28

## 2023-11-28 RX ORDER — ATROPINE SULFATE 0.1 MG/ML
1 INJECTION INTRAVENOUS
Status: COMPLETED | OUTPATIENT
Start: 2023-11-28 | End: 2023-11-28

## 2023-11-28 RX ADMIN — HUMAN ALBUMIN MICROSPHERES AND PERFLUTREN 0.66 MG: 10; .22 INJECTION, SOLUTION INTRAVENOUS at 04:11

## 2023-11-28 RX ADMIN — HYDROCHLOROTHIAZIDE 25 MG: 25 TABLET ORAL at 08:11

## 2023-11-28 RX ADMIN — SUCRALFATE 1 G: 1 SUSPENSION ORAL at 05:11

## 2023-11-28 RX ADMIN — ALUMINUM HYDROXIDE, MAGNESIUM HYDROXIDE, AND SIMETHICONE 30 ML: 200; 200; 20 SUSPENSION ORAL at 05:11

## 2023-11-28 RX ADMIN — ATENOLOL 50 MG: 25 TABLET ORAL at 08:11

## 2023-11-28 RX ADMIN — DOBUTAMINE HYDROCHLORIDE 10 MCG/KG/MIN: 400 INJECTION INTRAVENOUS at 04:11

## 2023-11-28 RX ADMIN — ASPIRIN 81 MG: 81 TABLET, CHEWABLE ORAL at 08:11

## 2023-11-28 RX ADMIN — ATORVASTATIN CALCIUM 20 MG: 20 TABLET, FILM COATED ORAL at 08:11

## 2023-11-28 RX ADMIN — AMLODIPINE BESYLATE 10 MG: 10 TABLET ORAL at 08:11

## 2023-11-28 RX ADMIN — SUCRALFATE 1 G: 1 SUSPENSION ORAL at 11:11

## 2023-11-28 RX ADMIN — SUCRALFATE 1 G: 1 SUSPENSION ORAL at 06:11

## 2023-11-28 RX ADMIN — ATROPINE SULFATE 1 MG: 0.1 INJECTION INTRAVENOUS at 04:11

## 2023-11-28 RX ADMIN — POTASSIUM BICARBONATE 40 MEQ: 391 TABLET, EFFERVESCENT ORAL at 09:11

## 2023-11-28 RX ADMIN — ALUMINUM HYDROXIDE, MAGNESIUM HYDROXIDE, AND SIMETHICONE 30 ML: 200; 200; 20 SUSPENSION ORAL at 06:11

## 2023-11-28 RX ADMIN — ALUMINUM HYDROXIDE, MAGNESIUM HYDROXIDE, AND SIMETHICONE 30 ML: 200; 200; 20 SUSPENSION ORAL at 10:11

## 2023-11-28 NOTE — DISCHARGE SUMMARY
ED Observation Unit  Discharge Summary        History of Present Illness:    78-year-old female.  She has a documented past medical history significant for hypertension, hyperlipidemia, CKD, prediabetes, PE/DVT not currently on anticoagulation, cervical DDD, anxiety, and aortic atherosclerosis.  She was sent to the ER from urgent care for an emergent evaluation due to complaints of chest discomfort.  She was given aspirin prior to arrival here (she took 1 baby aspirin at home and was given 3 baby aspirin at urgent Care).  The patient reports having discomfort in the center of her chest that radiates through to her upper back that started 2 days ago.  She states that the degree is mild-to-moderate.  She states that the course waxes and wanes but never completely goes away.  She states that she has mild exertional dyspnea, but denies any shortness of breath at rest.  She denies any lower extremity pain or swelling.  She has tried taking Tylenol with no significant relief.  Of note, the patient was recently COVID positive and completed Paxlovid.  She states that she has had viral symptoms for the past 10 days that she believes have nearly resolved, although she does still have mild cough and nasal congestion.       Observation Course:    VSS  Troponin negative x 3  Cardiac monitoring uneventful   CTA negative for any acute findings - incidental pulmonary nodule reported, pt informed    Cardiac stress test completed - negative for ischemia - mild aortic valve stenosis reported, pt informed     Consultants:    N/A    Final Diagnosis:  Chest pain   Covid   Pulmonary nodule  Aortic valve stenosis, mild     Discharge Condition: Good    Disposition: Home or Self Care     Time spent on the discharge of the patient including review of hospital course with the patient. reviewing discharge medications and arranging follow-up care 35 minutes.  Patient was seen and examined on the date of discharge and determined to be suitable  for discharge.    Ambulatory referral to cardiology clinic was ordered, patient was instructed to follow up at next available  The patient was instructed to follow up with the primary care physician within the next 1-2 days for re-evaluation and further management  The patient was advised to return to the ER promptly if unimproved or worse in any way  The patient verbalized understanding and comfort with the plan    Follow Up:  Future Appointments   Date Time Provider Department Center   12/4/2023 11:20 AM PRIV PRE-ADMIT, ENDO -[facility] ENDOPP4 [facility]   2/9/2024  9:00 AM LAB, [facility] LABDRAW [facility]   5/16/2024 10:00 AM Gloria Smith MD [facility]

## 2023-11-28 NOTE — ED NOTES
Assumed care of patient. Pt remains on cardiac monitor. Bed placed in low locked position, side rails up x2, call bell is within reach. Pt in NAD at this time.

## 2023-11-28 NOTE — ED NOTES
Jasmin Alfaro, a 78 y.o. female presents to the ED w/ complaint of chest pain with cough    Triage note:  Chief Complaint   Patient presents with    Covid Positive    Chest Pain     Denies cardiac hx, cough     Review of patient's allergies indicates:   Allergen Reactions    Meperidine Hives and Other (See Comments)     Unknown reaction       Past Medical History:   Diagnosis Date    Allergic rhinitis     Anxiety     Aortic atherosclerosis     CKD (chronic kidney disease)     Colon polyps     COVID-19 11/25/2020    DDD (degenerative disc disease), cervical     DVT (deep venous thrombosis)     Hyperlipidemia     Hypertension     Multiple thyroid nodules     Prediabetes     Pulmonary emboli       LOC: The patient is awake, alert, aware of environment with an appropriate affect. Oriented x4, speaking appropriately  APPEARANCE: Pt resting comfortably, in no acute distress, pt is clean and well groomed, clothing properly fastened  SKIN:The skin is warm and dry, color consistent with ethnicity, patient has normal skin turgor and moist mucus membranes, no bruising noted   RESPIRATORY:Airway is open and patent, respirations are spontaneous, patient has a normal effort and rate, no accessory muscle use noted.  CARDIAC: sanjana rate and rhythm, no peripheral edema noted, capillary refill < 3 seconds, bilateral radial pulses 2+. Chest pain resolved on entry to EDOU  ABDOMEN: Soft, non tender, non distended.   NEUROLOGIC: PERRLA, facial expression is symmetrical, patient moving all extremities spontaneously, normal sensation in all extremities when touched with a finger.  Follows all commands appropriately  MUSCULOSKELETAL: Patient moving all extremities spontaneously, no obvious swelling or deformities noted.

## 2023-11-28 NOTE — PROGRESS NOTES
ED Observation Unit  Progress Note      HPI   78-year-old female.  She has a documented past medical history significant for hypertension, hyperlipidemia, CKD, prediabetes, PE/DVT not currently on anticoagulation, cervical DDD, anxiety, and aortic atherosclerosis.  She was sent to the ER from urgent care for an emergent evaluation due to complaints of chest discomfort.  She was given aspirin prior to arrival here (she took 1 baby aspirin at home and was given 3 baby aspirin at urgent Care).  The patient reports having discomfort in the center of her chest that radiates through to her upper back that started 2 days ago.  She states that the degree is mild-to-moderate.  She states that the course waxes and wanes but never completely goes away.  She states that she has mild exertional dyspnea, but denies any shortness of breath at rest.  She denies any lower extremity pain or swelling.  She has tried taking Tylenol with no significant relief.  Of note, the patient was recently COVID positive and completed Paxlovid.  She states that she has had viral symptoms for the past 10 days that she believes have nearly resolved, although she does still have mild cough and nasal congestion.      I reviewed the ED Provider Note dated 11/27/23 prior to my evaluation of this patient.  I reviewed all labs and imaging performed in the Main ED, prior to patient being placed in Observation. Patient was placed in the ED Observation Unit for Chest pain for troponin trending, stress test       Interval History    VSS. Troponin x 3 negative. No leukocytosis. D-Dimer elevated. CTA with no evidence of PE but pulmonary nodule noted. Pending stress test today.         PMHx   Past Medical History:   Diagnosis Date    Allergic rhinitis     Anxiety     Aortic atherosclerosis     CKD (chronic kidney disease)     Colon polyps     COVID-19 11/25/2020    DDD (degenerative disc disease), cervical     DVT (deep venous thrombosis)     Hyperlipidemia      Hypertension     Multiple thyroid nodules     Prediabetes     Pulmonary emboli       Past Surgical History:   Procedure Laterality Date    APPENDECTOMY      BIOPSY OF THYROID      HYSTERECTOMY      INCISIONAL HERNIA REPAIR      LAPAROTOMY          Family Hx   Family History   Problem Relation Age of Onset    Hypertension Mother     Cancer Mother         small intestines    Heart failure Mother     Hypertension Father     Hypertension Sister     Kidney failure Sister         ESRD on HD    Diabetes type I Sister     Hypothyroidism Sister     Hypertension Brother     Colon cancer Brother     Heart failure Brother     Kidney failure Brother         ESRD on HD        Social Hx   Social History     Socioeconomic History    Marital status: Single   Tobacco Use    Smoking status: Never    Smokeless tobacco: Never   Substance and Sexual Activity    Alcohol use: Not Currently    Drug use: Never    Sexual activity: Not Currently     Social Determinants of Health     Financial Resource Strain: Low Risk  (8/2/2023)    Overall Financial Resource Strain (CARDIA)     Difficulty of Paying Living Expenses: Not hard at all   Food Insecurity: No Food Insecurity (8/2/2023)    Hunger Vital Sign     Worried About Running Out of Food in the Last Year: Never true     Ran Out of Food in the Last Year: Never true   Transportation Needs: No Transportation Needs (8/2/2023)    PRAPARE - Transportation     Lack of Transportation (Medical): No     Lack of Transportation (Non-Medical): No   Physical Activity: Sufficiently Active (8/2/2023)    Exercise Vital Sign     Days of Exercise per Week: 5 days     Minutes of Exercise per Session: 30 min   Stress: No Stress Concern Present (8/2/2023)    [employer] - Occupational Stress Questionnaire     Feeling of Stress : Not at all   Social Connections: Socially Integrated (8/2/2023)    Social Connection and Isolation Panel [NHANES]     Frequency of Communication with Friends  and Family: Twice a week     Frequency of Social Gatherings with Friends and Family: Once a week     Attends Zoroastrianism Services: More than 4 times per year     Active Member of Clubs or Organizations: Yes     Attends Club or Organization Meetings: 1 to 4 times per year     Marital Status:    Housing Stability: Unknown (8/2/2023)    Housing Stability Vital Sign     Unable to Pay for Housing in the Last Year: No     Unstable Housing in the Last Year: No        Vital Signs   Vitals:    11/28/23 0345 11/28/23 0558 11/28/23 0627 11/28/23 0755   BP: 137/60   138/67   BP Location:       Patient Position:    Lying   Pulse: 69 69  66   Resp: 16   16   Temp:   98.7 °F (37.1 °C) 98.3 °F (36.8 °C)   TempSrc:    Oral   SpO2: 97%   99%   Weight:       Height:            Review of Systems  Review of Systems   Constitutional:  Negative for chills and fever.   HENT:  Positive for congestion. Negative for sore throat.    Respiratory:  Positive for cough. Negative for shortness of breath.    Cardiovascular:  Negative for chest pain.   Gastrointestinal:  Negative for abdominal pain.   Genitourinary:  Negative for dysuria and hematuria.   Musculoskeletal: Negative.    Neurological:  Negative for weakness.       Brief Physical Exam/Reassessment   Physical Exam  Vitals and nursing note reviewed.   Constitutional:       Appearance: She is well-developed.   Eyes:      Pupils: Pupils are equal, round, and reactive to light.   Cardiovascular:      Rate and Rhythm: Normal rate.      Heart sounds: Normal heart sounds.   Pulmonary:      Effort: Pulmonary effort is normal.   Abdominal:      Palpations: Abdomen is soft.      Tenderness: There is no abdominal tenderness.   Musculoskeletal:         General: Normal range of motion.      Cervical back: Normal range of motion.   Skin:     General: Skin is warm and dry.      Capillary Refill: Capillary refill takes less than 2 seconds.   Neurological:      Mental Status: She is alert and oriented  to person, place, and time.         Labs/Imaging   Labs Reviewed   COMPREHENSIVE METABOLIC PANEL - Abnormal; Notable for the following components:       Result Value    Glucose 133 (*)     eGFR 58.0 (*)     All other components within normal limits   B-TYPE NATRIURETIC PEPTIDE - Abnormal; Notable for the following components:     (*)     All other components within normal limits   D DIMER, QUANTITATIVE - Abnormal; Notable for the following components:    D-Dimer 0.90 (*)     All other components within normal limits   TROPONIN ISTAT - Abnormal; Notable for the following components:    POC Cardiac Troponin I 0.09 (*)     All other components within normal limits   CBC W/ AUTO DIFFERENTIAL - Abnormal; Notable for the following components:    MCH 31.2 (*)     All other components within normal limits   COMPREHENSIVE METABOLIC PANEL - Abnormal; Notable for the following components:    Potassium 3.4 (*)     Glucose 171 (*)     All other components within normal limits   CBC W/ AUTO DIFFERENTIAL   TROPONIN I   TROPONIN I   TROPONIN I   TROPONIN I      Imaging Results              X-Ray Chest AP Portable (Final result)  Result time 11/27/23 20:32:49      Final result by Messi Ramos MD (11/27/23 20:32:49)                   Impression:      1. Chronic appearing interstitial findings, no acute cardiopulmonary process.      Electronically signed by: Messi aRmos MD  Date:    11/27/2023  Time:    20:32               Narrative:    EXAMINATION:  XR CHEST AP PORTABLE    CLINICAL HISTORY:  Chest Pain;    TECHNIQUE:  Single frontal view of the chest was performed.    COMPARISON:  09/27/2007    FINDINGS:  The cardiomediastinal silhouette is not enlarged, magnified by technique noting calcification of the aorta..  There is no pleural effusion.  The trachea is midline.  The lungs are symmetrically expanded bilaterally with coarse interstitial attenuation.  No large focal consolidation seen.  There is no pneumothorax.   The osseous structures are remarkable for degenerative change..                                       CTA Chest Non-Coronary (PE Studies) (Final result)  Result time 11/27/23 21:27:49      Final result by Rocael Khan MD (11/27/23 21:27:49)                   Impression:      No evidence of pulmonary embolus to the level of the proximal subsegmental pulmonary arteries.    Tiny pulmonary nodules at the right lung base measuring up to 0.2 cm.  For multiple solid nodules all <6 mm, Fleischner Society 2017 guidelines recommend no routine follow up for a low risk patient, or follow up with non-contrast chest CT at 12 months after discovery in a high risk patient.    Electronically signed by resident: Leslye Hargrove  Date:    11/27/2023  Time:    20:25    Electronically signed by: Rocael Khan MD  Date:    11/27/2023  Time:    21:27               Narrative:    EXAMINATION:  CTA CHEST NON CORONARY (PE STUDIES)    CLINICAL HISTORY:  Pulmonary embolism (PE) suspected, high prob;    TECHNIQUE:  Low dose axial images, sagittal and coronal reformations were obtained from the thoracic inlet to the lung bases.  75 cc Omnipaque 350 intravenous contrast administered according to PE protocol..    COMPARISON:  None    FINDINGS:  Base of Neck: No significant abnormality.Thyroid appears within normal limits.    Thoracic soft tissues: Unremarkable.  There are subcentimeter supraclavicular lymph nodes.    Brittny/Mediastinum: No pathologic sajan enlargement.    Heart: Normal size. No pericardial effusion.    Pulmonary vasculature: Evaluation is somewhat limited by beam hardening artifact and contrast streak artifact.  Pulmonary arteries distribute normally without evidence of filling defect to indicate pulmonary thromboembolism to the level of the proximal subsegmental pulmonary arteries..    Aorta: Normal caliber.  Scattered calcific atherosclerosis.    Trachea and Proximal airways: Patent.    Lungs/Pleura: Evaluation of the lung parenchyma  somewhat limited by respiratory motion artifact.  There are bandlike opacities at the lung bases, likely represent atelectasis.  No consolidation.  Tiny pulmonary nodules at the right lung base (for example 3-278).  No pleural effusion.    Esophagus: Small hiatal hernia.    Upper Abdomen: Mild reflux of contrast from the left atrium into the inferior vena cava.    Bones: No acute fracture. No suspicious lytic or sclerotic lesions.                                       I reviewed all labs, imaging, EKGs.     Plan   Chest pain  -troponin trending  -EKG with NSR, no signs of ischemia  -D-dimer was elevated, CTA with no PE. Possibly elevated due to recent COVID. No current COVID symptoms, completed paxlovid  -she is on beta blocker, dobutamine stress echo ordered  -continue home medication     I have discussed this case with PRANAY Arellano.

## 2023-11-28 NOTE — H&P
ED Observation Unit  History and Physical      I assumed care of this patient from the Main ED at onset of observation time, 21:45 on 11/27/2023.       History of Present Illness:    78-year-old female.  She has a documented past medical history significant for hypertension, hyperlipidemia, CKD, prediabetes, PE/DVT not currently on anticoagulation, cervical DDD, anxiety, and aortic atherosclerosis.  She was sent to the ER from urgent care for an emergent evaluation due to complaints of chest discomfort.  She was given aspirin prior to arrival here (she took 1 baby aspirin at home and was given 3 baby aspirin at urgent Care).  The patient reports having discomfort in the center of her chest that radiates through to her upper back that started 2 days ago.  She states that the degree is mild-to-moderate.  She states that the course waxes and wanes but never completely goes away.  She states that she has mild exertional dyspnea, but denies any shortness of breath at rest.  She denies any lower extremity pain or swelling.  She has tried taking Tylenol with no significant relief.  Of note, the patient was recently COVID positive and completed Paxlovid.  She states that she has had viral symptoms for the past 10 days that she believes have nearly resolved, although she does still have mild cough and nasal congestion.     I reviewed the ED Provider Note dated 11/27/23 prior to my evaluation of this patient.  I reviewed all labs and imaging performed in the Main ED, prior to patient being placed in Observation. Patient was placed in the ED Observation Unit for Chest pain.    In the ED - VSS. Troponin x 2 negative. No leukocytosis. D-Dimer elevated. CTA with no evidence of PE but pulmonary nodule noted.     Placed in ED Observation for troponin trending, stress test.     PMHx   Past Medical History:   Diagnosis Date    Allergic rhinitis     Anxiety     Aortic atherosclerosis     CKD (chronic kidney disease)     Colon polyps      COVID-19 11/25/2020    DDD (degenerative disc disease), cervical     DVT (deep venous thrombosis)     Hyperlipidemia     Hypertension     Multiple thyroid nodules     Prediabetes     Pulmonary emboli       Past Surgical History:   Procedure Laterality Date    APPENDECTOMY      BIOPSY OF THYROID      HYSTERECTOMY      INCISIONAL HERNIA REPAIR      LAPAROTOMY          Family Hx   Family History   Problem Relation Age of Onset    Hypertension Mother     Cancer Mother         small intestines    Heart failure Mother     Hypertension Father     Hypertension Sister     Kidney failure Sister         ESRD on HD    Diabetes type I Sister     Hypothyroidism Sister     Hypertension Brother     Colon cancer Brother     Heart failure Brother     Kidney failure Brother         ESRD on HD        Social Hx   Social History     Socioeconomic History    Marital status: Single   Tobacco Use    Smoking status: Never    Smokeless tobacco: Never   Substance and Sexual Activity    Alcohol use: Not Currently    Drug use: Never    Sexual activity: Not Currently     Social Determinants of Health     Financial Resource Strain: Low Risk  (8/2/2023)    Overall Financial Resource Strain (CARDIA)     Difficulty of Paying Living Expenses: Not hard at all   Food Insecurity: No Food Insecurity (8/2/2023)    Hunger Vital Sign     Worried About Running Out of Food in the Last Year: Never true     Ran Out of Food in the Last Year: Never true   Transportation Needs: No Transportation Needs (8/2/2023)    PRAPARE - Transportation     Lack of Transportation (Medical): No     Lack of Transportation (Non-Medical): No   Physical Activity: Sufficiently Active (8/2/2023)    Exercise Vital Sign     Days of Exercise per Week: 5 days     Minutes of Exercise per Session: 30 min   Stress: No Stress Concern Present (8/2/2023)    [employer] - Occupational Stress Questionnaire     Feeling of Stress : Not at all   Social Connections:  "Socially Integrated (8/2/2023)    Social Connection and Isolation Panel [NHANES]     Frequency of Communication with Friends and Family: Twice a week     Frequency of Social Gatherings with Friends and Family: Once a week     Attends [employer] Services: More than 4 times per year     Active Member of Clubs or Organizations: Yes     Attends Club or Organization Meetings: 1 to 4 times per year     Marital Status:    Housing Stability: Unknown (8/2/2023)    Housing Stability Vital Sign     Unable to Pay for Housing in the Last Year: No     Unstable Housing in the Last Year: No        Vital Signs   Vitals:    11/27/23 1629 11/27/23 2024   BP: 125/71 135/63   BP Location:  Right arm   Patient Position:  Sitting   Pulse: 70 61   Resp: 18 18   Temp: 98.8 °F (37.1 °C) 98.6 °F (37 °C)   TempSrc: Oral Oral   SpO2: 99% 97%   Weight: 79.8 kg (176 lb)    Height: 5' 2" (1.575 m)         Review of Systems  Review of Systems   Constitutional:  Positive for malaise/fatigue.   Cardiovascular:  Positive for chest pain.   Neurological:  Positive for headaches.       Physical Exam  Physical Exam  Vitals and nursing note reviewed.   Constitutional:       Appearance: She is well-developed.   HENT:      Head: Normocephalic and atraumatic.   Cardiovascular:      Rate and Rhythm: Normal rate and regular rhythm.   Pulmonary:      Effort: Pulmonary effort is normal. No respiratory distress.      Breath sounds: Normal breath sounds. No stridor.   Musculoskeletal:         General: Normal range of motion.      Cervical back: Normal range of motion and neck supple.   Skin:     General: Skin is warm and dry.   Neurological:      Mental Status: She is alert and oriented to person, place, and time.   Psychiatric:         Mood and Affect: Mood normal.         Behavior: Behavior normal.         Medications:   Scheduled Meds:   [START ON 11/28/2023] aluminum-magnesium hydroxide-simethicone  30 mL Oral QID (AC & HS)    [START ON 11/28/2023] " amLODIPine  10 mg Oral Daily    [START ON 11/28/2023] aspirin  81 mg Oral Daily    [START ON 11/28/2023] atenoloL  50 mg Oral Daily    [START ON 11/28/2023] atorvastatin  20 mg Oral Daily    [START ON 11/28/2023] hydroCHLOROthiazide  25 mg Oral Daily    [START ON 11/28/2023] sucralfate  1 g Oral Q6H     Continuous Infusions:  PRN Meds:.[START ON 11/28/2023] acetaminophen, [START ON 11/28/2023] acetaminophen, [START ON 11/28/2023] melatonin, nitroGLYCERIN, [START ON 11/28/2023] ondansetron, [START ON 11/28/2023] sodium chloride 0.9%      Assessment/Plan:  Chest pain  -troponin trending  -EKG with NSR, no signs of ischemia  -D-dimer was elevated, CTA with no PE. Possibly elevated due to recent COVID. No current COVID symptoms, completed paxlovid  -she is on beta blocker, dobutamine stress echo ordered  -continue home medication      Case was discussed with the ED provider, Steven Valerio PA-C

## 2023-11-28 NOTE — ED PROVIDER NOTES
Encounter Date: 11/27/2023       History     Chief Complaint   Patient presents with    Covid Positive    Chest Pain     Denies cardiac hx, cough     Patient is a 78-year-old female.  She has a documented past medical history significant for hypertension, hyperlipidemia, CKD, prediabetes, PE/DVT not currently on anticoagulation, cervical DDD, anxiety, and aortic atherosclerosis.  She was sent to the ER from urgent care for an emergent evaluation due to complaints of chest discomfort.  She was given aspirin prior to arrival here (she took 1 baby aspirin at home and was given 3 baby aspirin at urgent Care).  The patient reports having discomfort in the center of her chest that radiates through to her upper back that started 2 days ago.  She states that the degree is mild-to-moderate.  She states that the course waxes and wanes but never completely goes away.  She states that she has mild exertional dyspnea, but denies any shortness of breath at rest.  She denies any lower extremity pain or swelling.  She has tried taking Tylenol with no significant relief.  Of note, the patient was recently COVID positive and prescribed Paxlovid.  She states that she has had viral symptoms for the past 10 days that she believes have nearly resolved, although she does still have mild cough and nasal congestion.      Review of patient's allergies indicates:   Allergen Reactions    Meperidine Hives and Other (See Comments)     Unknown reaction       Past Medical History:   Diagnosis Date    Allergic rhinitis     Anxiety     Aortic atherosclerosis     CKD (chronic kidney disease)     Colon polyps     COVID-19 11/25/2020    DDD (degenerative disc disease), cervical     DVT (deep venous thrombosis)     Hyperlipidemia     Hypertension     Multiple thyroid nodules     Prediabetes     Pulmonary emboli      Past Surgical History:   Procedure Laterality Date    APPENDECTOMY      BIOPSY OF THYROID      HYSTERECTOMY      INCISIONAL HERNIA REPAIR       LAPAROTOMY       Family History   Problem Relation Age of Onset    Hypertension Mother     Cancer Mother         small intestines    Heart failure Mother     Hypertension Father     Hypertension Sister     Kidney failure Sister         ESRD on HD    Diabetes type I Sister     Hypothyroidism Sister     Hypertension Brother     Colon cancer Brother     Heart failure Brother     Kidney failure Brother         ESRD on HD     Social History     Tobacco Use    Smoking status: Never    Smokeless tobacco: Never   Substance Use Topics    Alcohol use: Not Currently    Drug use: Never     Review of Systems   Constitutional:  Negative for chills and fever.   HENT:  Positive for congestion and rhinorrhea. Negative for sore throat.    Eyes:  Negative for visual disturbance.   Respiratory:  Positive for cough and shortness of breath.    Cardiovascular:  Positive for chest pain. Negative for palpitations and leg swelling.   Gastrointestinal:  Negative for abdominal pain, diarrhea, nausea and vomiting.   Genitourinary:  Negative for decreased urine volume, dysuria and flank pain.   Musculoskeletal:  Positive for back pain. Negative for gait problem and neck pain.   Skin:  Negative for color change and rash.   Allergic/Immunologic: Negative for immunocompromised state.   Neurological:  Negative for dizziness, syncope, facial asymmetry, speech difficulty, weakness, light-headedness, numbness and headaches.   Hematological:  Negative for adenopathy.   Psychiatric/Behavioral:  Negative for confusion.        Physical Exam     Initial Vitals [11/27/23 1629]   BP Pulse Resp Temp SpO2   125/71 70 18 98.8 °F (37.1 °C) 99 %      MAP       --         Physical Exam    Nursing note and vitals reviewed.  Constitutional: She appears well-developed and well-nourished. She is not diaphoretic.   Alert and ambulatory.  Nontoxic appearing.  No obvious distress appreciated at this time.  Accompanied by her .   HENT:   Head: Normocephalic.    Nasal congestion   Eyes: Conjunctivae are normal.   Neck: Neck supple. No JVD present.   Cardiovascular:  Normal rate and intact distal pulses.           Pulmonary/Chest: No respiratory distress.   No tachypnea or hypoxia.  No conversational dyspnea.  No increased work of breathing.  Coughed once during interview/exam.  No audible wheezes.   Abdominal: Abdomen is soft. She exhibits no distension. There is no abdominal tenderness. There is no rebound and no guarding.   Musculoskeletal:         General: Normal range of motion.      Cervical back: Neck supple.      Comments: No calf pain or tenderness     Neurological: She is alert and oriented to person, place, and time. She has normal strength. No sensory deficit.   Skin: Skin is warm and dry.   Psychiatric: She has a normal mood and affect. Her behavior is normal.         ED Course   Procedures  Labs Reviewed   COMPREHENSIVE METABOLIC PANEL - Abnormal; Notable for the following components:       Result Value    Glucose 133 (*)     eGFR 58.0 (*)     All other components within normal limits   B-TYPE NATRIURETIC PEPTIDE - Abnormal; Notable for the following components:     (*)     All other components within normal limits   D DIMER, QUANTITATIVE - Abnormal; Notable for the following components:    D-Dimer 0.90 (*)     All other components within normal limits   TROPONIN ISTAT - Abnormal; Notable for the following components:    POC Cardiac Troponin I 0.09 (*)     All other components within normal limits   CBC W/ AUTO DIFFERENTIAL   TROPONIN I   TROPONIN I   POCT TROPONIN   POCT TROPONIN     Results for orders placed or performed during the hospital encounter of 11/27/23   CBC auto differential   Result Value Ref Range    WBC 5.91 3.90 - 12.70 K/uL    RBC 4.65 4.00 - 5.40 M/uL    Hemoglobin 14.1 12.0 - 16.0 g/dL    Hematocrit 42.0 37.0 - 48.5 %    MCV 90 82 - 98 fL    MCH 30.3 27.0 - 31.0 pg    MCHC 33.6 32.0 - 36.0 g/dL    RDW 13.1 11.5 - 14.5 %    Platelets  206 150 - 450 K/uL    MPV 10.9 9.2 - 12.9 fL    Immature Granulocytes 0.2 0.0 - 0.5 %    Gran # (ANC) 3.6 1.8 - 7.7 K/uL    Immature Grans (Abs) 0.01 0.00 - 0.04 K/uL    Lymph # 1.7 1.0 - 4.8 K/uL    Mono # 0.4 0.3 - 1.0 K/uL    Eos # 0.1 0.0 - 0.5 K/uL    Baso # 0.04 0.00 - 0.20 K/uL    nRBC 0 0 /100 WBC    Gran % 61.2 38.0 - 73.0 %    Lymph % 28.8 18.0 - 48.0 %    Mono % 7.4 4.0 - 15.0 %    Eosinophil % 1.7 0.0 - 8.0 %    Basophil % 0.7 0.0 - 1.9 %    Differential Method Automated    Comprehensive metabolic panel   Result Value Ref Range    Sodium 136 136 - 145 mmol/L    Potassium 3.5 3.5 - 5.1 mmol/L    Chloride 99 95 - 110 mmol/L    CO2 27 23 - 29 mmol/L    Glucose 133 (H) 70 - 110 mg/dL    BUN 19 8 - 23 mg/dL    Creatinine 1.0 0.5 - 1.4 mg/dL    Calcium 10.2 8.7 - 10.5 mg/dL    Total Protein 8.2 6.0 - 8.4 g/dL    Albumin 4.0 3.5 - 5.2 g/dL    Total Bilirubin 0.3 0.1 - 1.0 mg/dL    Alkaline Phosphatase 88 55 - 135 U/L    AST 29 10 - 40 U/L    ALT 28 10 - 44 U/L    eGFR 58.0 (A) >60 mL/min/1.73 m^2    Anion Gap 10 8 - 16 mmol/L   Troponin I #1   Result Value Ref Range    Troponin I <0.006 0.000 - 0.026 ng/mL   B-Type natriuretic peptide (BNP)   Result Value Ref Range     (H) 0 - 99 pg/mL   D dimer, quantitative   Result Value Ref Range    D-Dimer 0.90 (H) <0.50 mg/L FEU   Troponin ISTAT   Result Value Ref Range    POC Cardiac Troponin I 0.09 (H) 0.00 - 0.08 ng/mL    Sample VENOUS        EKG Readings: (Independently Interpreted)   Initial Reading: No STEMI. Rhythm: Normal Sinus Rhythm. Heart Rate: 73. Ectopy: PVCs. ST Segments: Normal ST Segments. T Waves: Normal.   Reviewed and signed by the ER attending physician - Normal sinus rhythm, heart rate 73 beats per minute, occasional PVCs, no STEMI or acute ischemic changes reported       Imaging Results              X-Ray Chest AP Portable (Final result)  Result time 11/27/23 20:32:49      Final result by Messi Ramos MD (11/27/23 20:32:49)                    Impression:      1. Chronic appearing interstitial findings, no acute cardiopulmonary process.      Electronically signed by: Messi Ramos MD  Date:    11/27/2023  Time:    20:32               Narrative:    EXAMINATION:  XR CHEST AP PORTABLE    CLINICAL HISTORY:  Chest Pain;    TECHNIQUE:  Single frontal view of the chest was performed.    COMPARISON:  09/27/2007    FINDINGS:  The cardiomediastinal silhouette is not enlarged, magnified by technique noting calcification of the aorta..  There is no pleural effusion.  The trachea is midline.  The lungs are symmetrically expanded bilaterally with coarse interstitial attenuation.  No large focal consolidation seen.  There is no pneumothorax.  The osseous structures are remarkable for degenerative change..                                       CTA Chest Non-Coronary (PE Studies) (Final result)  Result time 11/27/23 21:27:49      Final result by Rocael Khan MD (11/27/23 21:27:49)                   Impression:      No evidence of pulmonary embolus to the level of the proximal subsegmental pulmonary arteries.    Tiny pulmonary nodules at the right lung base measuring up to 0.2 cm.  For multiple solid nodules all <6 mm, Fleischner Society 2017 guidelines recommend no routine follow up for a low risk patient, or follow up with non-contrast chest CT at 12 months after discovery in a high risk patient.    Electronically signed by resident: Leslye Hargrove  Date:    11/27/2023  Time:    20:25    Electronically signed by: Rocael Khan MD  Date:    11/27/2023  Time:    21:27               Narrative:    EXAMINATION:  CTA CHEST NON CORONARY (PE STUDIES)    CLINICAL HISTORY:  Pulmonary embolism (PE) suspected, high prob;    TECHNIQUE:  Low dose axial images, sagittal and coronal reformations were obtained from the thoracic inlet to the lung bases.  75 cc Omnipaque 350 intravenous contrast administered according to PE protocol..    COMPARISON:  None    FINDINGS:  Base of Neck:  No significant abnormality.Thyroid appears within normal limits.    Thoracic soft tissues: Unremarkable.  There are subcentimeter supraclavicular lymph nodes.    Brittny/Mediastinum: No pathologic sajan enlargement.    Heart: Normal size. No pericardial effusion.    Pulmonary vasculature: Evaluation is somewhat limited by beam hardening artifact and contrast streak artifact.  Pulmonary arteries distribute normally without evidence of filling defect to indicate pulmonary thromboembolism to the level of the proximal subsegmental pulmonary arteries..    Aorta: Normal caliber.  Scattered calcific atherosclerosis.    Trachea and Proximal airways: Patent.    Lungs/Pleura: Evaluation of the lung parenchyma somewhat limited by respiratory motion artifact.  There are bandlike opacities at the lung bases, likely represent atelectasis.  No consolidation.  Tiny pulmonary nodules at the right lung base (for example 3-278).  No pleural effusion.    Esophagus: Small hiatal hernia.    Upper Abdomen: Mild reflux of contrast from the left atrium into the inferior vena cava.    Bones: No acute fracture. No suspicious lytic or sclerotic lesions.                                       Medications   nitroGLYCERIN SL tablet 0.4 mg (has no administration in time range)   iohexoL (OMNIPAQUE 350) injection 75 mL (75 mLs Intravenous Given 11/27/23 1942)     Medical Decision Making  Amount and/or Complexity of Data Reviewed  Labs: ordered.  Radiology: ordered.    Risk  Prescription drug management.      Additional MDM:     EKG: I have independently interpreted EKG(s) - see notes.     X-Rays: I have independently interpreted X-Ray(s) - see notes.   PERC Rule:   Age is greater than or equal to 50 = 1.0  Heart Rate is greater than or equal to 100 = 0.0  SaO2 on room air < 95% = 0.0  Unilateral leg swelling = 0.0  Hemoptysis = 0.0  Recent surgery or trauma = 0.0  Prior PE or DVT =  1.0  Hormone use = 0.00  PERC Score = 2      Heart Score:    History:           Slightly suspicious.  ECG:             Normal  Age:               >65 years  Risk factors: >= 3 risk factors or history of atherosclerotic disease  Troponin:       Less than or equal to normal limit  Heart Score = 4                ED Course as of 11/27/23 2155 Mon Nov 27, 2023   1829 EKG with sinus rhythm, rate 81, no STEMI [NN]      ED Course User Index  [NN] Gloria Colbert MD               Medical Decision Making:   History:   Old Medical Records: I decided to obtain old medical records.  Old Records Summarized: records from clinic visits.  Initial Assessment:   78-year-old female presents to the ER for an emergent evaluation due to complaints of acute substernal chest discomfort that radiates to back x3 days with associated mild exertional dyspnea.  Patient was COVID positive earlier this week.  Differential Diagnosis:   Differential diagnosis includes but is not limited to:  ACS, PE, aortic dissection, pneumonia, costochondritis, pleurisy, pleural effusion, musculoskeletal chest pain, etc.  Clinical Tests:   Lab Tests: Ordered and Reviewed  Radiological Study: Ordered and Reviewed  Medical Tests: Ordered and Reviewed  ED Management:  Vital signs reviewed, in normal range, benign   Chart review was completed  I discussed the case in detail with the ER attending physician, will complete cardiac workup including D-dimer and CTA chest    Update:  Initial cardiac workup including CTA chest is unremarkable.  Chest pain most likely musculoskeletal due to patient having a cough for the past week, however calculated heart score at 4, will admit for observation to [facility] for chest pain rule out.  I discussed the case with the [facility] RUSS who is agreeable to admission.  Patient updated on current results and plan.    Other:   I have discussed this case with another health care provider.             Clinical Impression:  Final diagnoses:  [R07.9] Chest pain (Primary)  [M54.9] Upper back pain  [U07.1]  COVID  [R91.1] Pulmonary nodule, right          ED Disposition Condition    Observation Stable                Steven Valerio, NELSY  11/27/23 2596

## 2023-11-30 ENCOUNTER — TELEPHONE (OUTPATIENT)
Dept: PRIMARY CARE CLINIC | Facility: CLINIC | Age: 78
End: 2023-11-30
Payer: MEDICARE

## 2023-11-30 NOTE — TELEPHONE ENCOUNTER
I veronika pt. She was dx with covid on 11/20 and went to ED yesterday with chest pain. Was placed in obs. Hosp f/u scheduled for nx week

## 2023-11-30 NOTE — TELEPHONE ENCOUNTER
----- Message from Natalia Romero sent at 11/29/2023  2:16 PM CST -----  Contact: self  [phone]  Pt requesting a call stated hospitalized with covid.    Please call and advise

## 2023-12-01 ENCOUNTER — OFFICE VISIT (OUTPATIENT)
Dept: PRIMARY CARE CLINIC | Facility: CLINIC | Age: 78
End: 2023-12-01
Payer: MEDICARE

## 2023-12-01 ENCOUNTER — PATIENT OUTREACH (OUTPATIENT)
Dept: ADMINISTRATIVE | Facility: CLINIC | Age: 78
End: 2023-12-01
Payer: MEDICARE

## 2023-12-01 VITALS
TEMPERATURE: 98 F | OXYGEN SATURATION: 98 % | BODY MASS INDEX: 31.97 KG/M2 | HEIGHT: 62 IN | WEIGHT: 173.75 LBS | HEART RATE: 65 BPM | SYSTOLIC BLOOD PRESSURE: 110 MMHG | DIASTOLIC BLOOD PRESSURE: 60 MMHG | RESPIRATION RATE: 18 BRPM

## 2023-12-01 DIAGNOSIS — E78.5 HYPERLIPIDEMIA, UNSPECIFIED HYPERLIPIDEMIA TYPE: ICD-10-CM

## 2023-12-01 DIAGNOSIS — M85.80 OSTEOPENIA, UNSPECIFIED LOCATION: ICD-10-CM

## 2023-12-01 DIAGNOSIS — E04.2 MULTIPLE THYROID NODULES: ICD-10-CM

## 2023-12-01 DIAGNOSIS — K11.20 PAROTITIS: ICD-10-CM

## 2023-12-01 DIAGNOSIS — K21.9 GASTROESOPHAGEAL REFLUX DISEASE, UNSPECIFIED WHETHER ESOPHAGITIS PRESENT: ICD-10-CM

## 2023-12-01 DIAGNOSIS — I10 PRIMARY HYPERTENSION: Primary | ICD-10-CM

## 2023-12-01 DIAGNOSIS — G47.33 OSA (OBSTRUCTIVE SLEEP APNEA): ICD-10-CM

## 2023-12-01 DIAGNOSIS — K76.0 FATTY LIVER: ICD-10-CM

## 2023-12-01 DIAGNOSIS — E87.6 HYPOKALEMIA: ICD-10-CM

## 2023-12-01 DIAGNOSIS — M54.12 CERVICAL RADICULOPATHY: ICD-10-CM

## 2023-12-01 DIAGNOSIS — E11.9 TYPE 2 DIABETES MELLITUS WITHOUT COMPLICATION, WITHOUT LONG-TERM CURRENT USE OF INSULIN: ICD-10-CM

## 2023-12-01 PROCEDURE — 1159F PR MEDICATION LIST DOCUMENTED IN MEDICAL RECORD: ICD-10-PCS | Mod: HCNC,CPTII,S$GLB, | Performed by: INTERNAL MEDICINE

## 2023-12-01 PROCEDURE — 3288F FALL RISK ASSESSMENT DOCD: CPT | Mod: HCNC,CPTII,S$GLB, | Performed by: INTERNAL MEDICINE

## 2023-12-01 PROCEDURE — 3074F SYST BP LT 130 MM HG: CPT | Mod: HCNC,CPTII,S$GLB, | Performed by: INTERNAL MEDICINE

## 2023-12-01 PROCEDURE — 99999 PR PBB SHADOW E&M-EST. PATIENT-LVL V: CPT | Mod: PBBFAC,HCNC,, | Performed by: INTERNAL MEDICINE

## 2023-12-01 PROCEDURE — 1160F PR REVIEW ALL MEDS BY PRESCRIBER/CLIN PHARMACIST DOCUMENTED: ICD-10-PCS | Mod: HCNC,CPTII,S$GLB, | Performed by: INTERNAL MEDICINE

## 2023-12-01 PROCEDURE — 1126F AMNT PAIN NOTED NONE PRSNT: CPT | Mod: HCNC,CPTII,S$GLB, | Performed by: INTERNAL MEDICINE

## 2023-12-01 PROCEDURE — 3288F PR FALLS RISK ASSESSMENT DOCUMENTED: ICD-10-PCS | Mod: HCNC,CPTII,S$GLB, | Performed by: INTERNAL MEDICINE

## 2023-12-01 PROCEDURE — 1159F MED LIST DOCD IN RCRD: CPT | Mod: HCNC,CPTII,S$GLB, | Performed by: INTERNAL MEDICINE

## 2023-12-01 PROCEDURE — 1126F PR PAIN SEVERITY QUANTIFIED, NO PAIN PRESENT: ICD-10-PCS | Mod: HCNC,CPTII,S$GLB, | Performed by: INTERNAL MEDICINE

## 2023-12-01 PROCEDURE — 1101F PT FALLS ASSESS-DOCD LE1/YR: CPT | Mod: HCNC,CPTII,S$GLB, | Performed by: INTERNAL MEDICINE

## 2023-12-01 PROCEDURE — 3078F DIAST BP <80 MM HG: CPT | Mod: HCNC,CPTII,S$GLB, | Performed by: INTERNAL MEDICINE

## 2023-12-01 PROCEDURE — 1101F PR PT FALLS ASSESS DOC 0-1 FALLS W/OUT INJ PAST YR: ICD-10-PCS | Mod: HCNC,CPTII,S$GLB, | Performed by: INTERNAL MEDICINE

## 2023-12-01 PROCEDURE — 99999 PR PBB SHADOW E&M-EST. PATIENT-LVL V: ICD-10-PCS | Mod: PBBFAC,HCNC,, | Performed by: INTERNAL MEDICINE

## 2023-12-01 PROCEDURE — 1160F RVW MEDS BY RX/DR IN RCRD: CPT | Mod: HCNC,CPTII,S$GLB, | Performed by: INTERNAL MEDICINE

## 2023-12-01 PROCEDURE — 99214 PR OFFICE/OUTPT VISIT, EST, LEVL IV, 30-39 MIN: ICD-10-PCS | Mod: HCNC,S$GLB,, | Performed by: INTERNAL MEDICINE

## 2023-12-01 PROCEDURE — 3078F PR MOST RECENT DIASTOLIC BLOOD PRESSURE < 80 MM HG: ICD-10-PCS | Mod: HCNC,CPTII,S$GLB, | Performed by: INTERNAL MEDICINE

## 2023-12-01 PROCEDURE — 3074F PR MOST RECENT SYSTOLIC BLOOD PRESSURE < 130 MM HG: ICD-10-PCS | Mod: HCNC,CPTII,S$GLB, | Performed by: INTERNAL MEDICINE

## 2023-12-01 PROCEDURE — 99214 OFFICE O/P EST MOD 30 MIN: CPT | Mod: HCNC,S$GLB,, | Performed by: INTERNAL MEDICINE

## 2023-12-01 NOTE — PROGRESS NOTES
"[facility] Note    Chief Complaint      Chief Complaint   Patient presents with    hospital f/u       History of Present Illness      Jasmin Alfaro is a 78 y.o.  AAF with HTN, HLD, AR, CKD III,  and Anxiety presents to  to fu chronic issues.  Last visit - 11/9//23     CP - Hosp - 11/27/23 - CP/TURPIN - Initial cardiac workup including CTA chest - 11/27/23 - no evid of PE.  Tiny pulmonary nodules at the right lung base measuring up to 0.2 cm. Low risk - nonsmoker - no fu nec.  Chest pain most likely musculoskeletal due to patient having a cough for the past week, however calculated heart score at 4. Stress Echo - 11/27/23 - EF - wnl. Grade I quan dysf, mild aortic sclerosis. neg for ischemia.  Rare PAC's. The CP is improving. "It has eased up a lot".  Her SOB has improved as well. She was referred to [facility], Dr. Krishnan, on 12/13/23.     Parotitis - Suspect likely due to COVID.  Rec warm or cool compresses. Tylenol prn.     UC - 11/20/23 - COVID 19. Doing better.  Still has some fatigue. She c/o Left manidbular pain/swelling. +hoarseness. She uses her Claritin, Flonase, and Astelin.     Hypokalemia - 3.4.  Rec a high potassium diet (bananas, oranges, spinach, potato, mushrooms, peas, sweet potatoes). This may be due to Hydrochlorothiazide so we started Kcl 10 MEq Q M,W,F.      Fatty Liver - Abd U/S - 11/3/22- fatty Liver. Rec limit tylenol and alcohol. Rec diet and exercise for wt loss.  As discussed at visit there is a potential for cirrhosis.   FIB-4 Calculation: 2.25 at 12/1/2023  8:02 AM   FIB-4 below 1.30 is considered as low-risk for advanced fibrosis  FIB-4 over 2.67 is considered as high-risk for advanced fibrosis  FIB-4 values between 1.30 and 2.67 are considered as intermediate-risk of advanced fibrosis for ages 36-64.   For ages > 64 the cut-off for low-risk goes to < 2.     Mod/Severe NIK - Snoring - Fu by sleep clinic. Sleep study - 6/3/22 - Moderately severe Obstructive Sleep " "Apnea. She is on an APAP - 7-12 cmH2O. She uses it nightly and rarely does not use it (4 times/month).   The mask bothers her at times so she will d/w DME.      Cervical Radiculopathy - MRI C-spine - 10/6/21- Spondylosis of the cervical spine, there is no severe canal stenosis. Fu by Ortho. C-spine Xrays - 2/25/22 - Mild degenerative changes as above and minimal instability. Pt on gabapentin 100 mg TID prn. Pt completed Ptx.       H/o Cisco Heel pain -  Xray 4/29/21 - Achilles enthesopathy and plantar calcaneal spur noted bilaterally. Gel insert helps. Calf pain improved with stretching.  "It's been a lot better".      Chronic Knee pain -She has had this pain "for a couple yrs".  Tylenol - it helps a little but not much. It can be 2-8/10 in severity if she is on her legs too long.   No trauma. No swelling.  No erythema, or warmth.  No h/o Gout.   Cisco Knee Xray - 4/29/21 -  Moderate loss of tibiofemoral cartilage space with osteophyte production.  She has trouble going up stairs.  Her Rt knee gives out/mahad.  Allegra now has an apartment on the 1st floor in a senior Independent Living facility. She uses Tylenol daily.  She will alert me for any GI S.E.      DM  II- HA1c -6.1 - 11/14/23. This is now consistent with Diabetes.  She had diabetic teaching. Rec a low carb diet.  I do not feel we need to start medication for this yet but will continue to monitor this.         HTN - Controlled on Atenolol 50 mg/d, Amlodipine 10 mg/d, HCTZ 25 mg QAm. Note she had ? angioedema/anaphylaxis to Losartan in past.      HLD - Pt controlled in feb. on Crestor 5 mg Qother day.  CK level was slightly high but stable at 247.  Her leg cramps have gotten better since starting magnesium and gabapentin. +atherosclerosis seen on prev Xray.      AR -Pt on Claritin prn and Flonase, Astelin prn.      Anxiety - Pt doing well off Lexapro 10 mg/d due to wt gain. Stable off meds.       GERD - Pt on Famotidine prn - no longer helping.  Rec Reflux prec. "  Pt doing better on Prilosec daily prn.      MNG - Pt prev fu by ENT, Dr. Patel.  She then fu for a second opinion at [facility] with Dr. Robyn Mann. She is now fu by Dr. Romero. Thyroid u/s - 8/8/23 - a Stable multinodular thyroid gland.  No nodules meet criteria for follow-up or FNA.     Osteopenia - h/o Rt humeral neck fracture.   She tries to walk. Cont Calcium and Vit d.  Repeat DEXA in 11/2025.      Fecal incontinence - Resolved.  Taking benefiber every day and her Rectal pressure and fecal incontinence has resolved. Due for C-scope. Doing better on Citrucel.      H/O PE - '74- She was tx at the time.  Pt no longer on Anticoagulation.      Acc by      HCM - Flu - none -defers; RSV - none - defers;  Tdap - ?< 10 yrs;  PCV 13 - 6/2/20;  PVX 23 - 7/14/21;  Shingrix - # 1 8/15/23; # 2 ;  COVID 19 Vaccine #1 - 3/3/21; #2 - 3/26/21; # 3 - 1/10/22; # 4 9/21/22; # 5 ;  MGM - 6/28/23 - repeat 1 yr;  DEXA - 11/20/23 - + Osteopenia;  PAP - no longer gets; Hep C Screen - neg - 6/2/20; C-scope - 10/15/20  +Polyps- told to repeat in 3 yrs; GI - Dr. Sanchez- planning to sched.;  Prev PCP - me then Dr. Kennedy; Ortho - Dr. Rodrigues; Ophtho - ; Endo - Dr. Romero; Podiatry - Dr. Oconnor    Patient Care Team:  Gloria Smith MD as PCP - General (Internal Medicine)  Izzy Alfaro MA as Care Coordinator  Janelle Tinoco RD, CDE as Diabetes Educator (Diabetes)     Health Maintenance:  Immunization History   Administered Date(s) Administered    COVID-19 MRNA, LN-S PF (MODERNA HALF 0.25 ML DOSE) 01/10/2022    COVID-19, MRNA, LN-S, PF (Pfizer) (Purple Cap) 03/03/2021, 03/26/2021    COVID-19, mRNA, LNP-S, bivalent booster, PF (PFIZER OMICRON) 09/21/2022    Pneumococcal Conjugate - 13 Valent 06/02/2020    Pneumococcal Polysaccharide - 23 Valent 07/14/2021    Zoster Recombinant 08/15/2023      Health Maintenance   Topic Date Due    TETANUS VACCINE  Never done    Shingles Vaccine (2 of 2)  "10/10/2023    DEXA Scan  11/15/2026    Lipid Panel  02/06/2028    Hepatitis C Screening  Completed        Past Medical History:  Past Medical History:   Diagnosis Date    Allergic rhinitis     Anxiety     Aortic atherosclerosis     CKD (chronic kidney disease)     Colon polyps     COVID-19 11/25/2020    DDD (degenerative disc disease), cervical     DVT (deep venous thrombosis)     Hyperlipidemia     Hypertension     Multiple thyroid nodules     Prediabetes     Pulmonary emboli        Past Surgical History:   has a past surgical history that includes Hysterectomy; Appendectomy; Incisional hernia repair; Laparotomy; and Biopsy of thyroid.    Family History:  family history includes Cancer in her mother; Colon cancer in her brother; Diabetes type I in her sister; Heart failure in her brother and mother; Hypertension in her brother, father, mother, and sister; Hypothyroidism in her sister; Kidney failure in her brother and sister.     Social History:  Social History     Tobacco Use    Smoking status: Never    Smokeless tobacco: Never   Substance Use Topics    Alcohol use: Not Currently    Drug use: Never       Review of Systems   Constitutional:  Negative for chills, diaphoresis and fever.   HENT:  Positive for postnasal drip. Negative for nasal congestion and sore throat.    Respiratory:  Positive for cough. Negative for chest tightness, shortness of breath and wheezing.         Improving.    Cardiovascular:  Positive for chest pain.        Intermittent chest discomfort with certain movements or cough - "It's a lot better".    Gastrointestinal:  Negative for abdominal pain, constipation, diarrhea, nausea and vomiting.   Endocrine: Negative for cold intolerance, heat intolerance and polydipsia.   Genitourinary:  Negative for difficulty urinating, dysuria and frequency.   Musculoskeletal:  Negative for arthralgias and myalgias.   Neurological:  Positive for headaches. Negative for dizziness.   Psychiatric/Behavioral:  " Negative for dysphoric mood. The patient is not nervous/anxious.         Medications:    Current Outpatient Medications:     amLODIPine (NORVASC) 10 MG tablet, Take 1 tablet (10 mg total) by mouth once daily., Disp: 90 tablet, Rfl: 1    aspirin 81 MG Chew, Take 81 mg by mouth once daily. , Disp: , Rfl:     atenoloL (TENORMIN) 50 MG tablet, Take 1 tablet (50 mg total) by mouth once daily., Disp: 90 tablet, Rfl: 1    azelastine (ASTELIN) 137 mcg (0.1 %) nasal spray, 1 spray (137 mcg total) by Nasal route 2 (two) times daily. As needed, Disp: 30 mL, Rfl: 3    blood sugar diagnostic Strp, To check BG twice daily, to use with TrueMetrix meter, Disp: 200 strip, Rfl: 3    blood-glucose meter kit, To check BG once daily, to use with insurance preferred meter, Disp: 1 each, Rfl: 0    diclofenac sodium (VOLTAREN) 1 % Gel, Apply 2 g topically 4 (four) times daily. As needed, Disp: 100 g, Rfl: 1    fluticasone propionate (FLONASE) 50 mcg/actuation nasal spray, 2 sprays (100 mcg total) by Each Nostril route once daily., Disp: 48 g, Rfl: 3    gabapentin (NEURONTIN) 100 MG capsule, TAKE 1 CAPSULE THREE TIMES DAILY (Patient taking differently: As needed), Disp: 90 capsule, Rfl: 2    hydroCHLOROthiazide (HYDRODIURIL) 25 MG tablet, Take 1 tablet (25 mg total) by mouth once daily., Disp: 90 tablet, Rfl: 1    lancets Misc, To check BG twice daily, to use with insurance preferred meter, Disp: 200 each, Rfl: 3    loratadine (CLARITIN) 10 mg tablet, Take 10 mg by mouth daily as needed. , Disp: , Rfl:     multivit with minerals/lutein (MULTIVITAMIN 50 PLUS ORAL), Take by mouth once daily. , Disp: , Rfl:     omeprazole (PRILOSEC) 20 MG capsule, TAKE 1 CAPSULE ONE TIME DAILY, Disp: 90 capsule, Rfl: 3    potassium chloride SA (K-DUR,KLOR-CON M) 10 MEQ tablet, 1 tab QM,W,F, Disp: 30 tablet, Rfl: 1    rosuvastatin (CRESTOR) 5 MG tablet, TAKE 1 TABLET ONE TIME DAILY, Disp: 90 tablet, Rfl: 1    TRUE METRIX GLUCOSE METER Misc, USE TO TEST BLOOD  "GLUCOSE ONCE DAILY, Disp: , Rfl:     TRUEPLUS LANCETS 33 gauge Misc, , Disp: , Rfl:     EPINEPHrine (EPIPEN 2-YAA) 0.3 mg/0.3 mL AtIn, Inject 0.3 mLs (0.3 mg total) into the muscle as needed., Disp: 1 each, Rfl: 0    hydrocortisone (ANUSOL-HC) 2.5 % rectal cream, Place rectally 2 (two) times daily., Disp: 28 g, Rfl: 1     Allergies:  Review of patient's allergies indicates:   Allergen Reactions    Meperidine Hives and Other (See Comments)     Unknown reaction         Physical Exam      Vital Signs  Temp: 97.8 °F (36.6 °C)  Temp Source: Oral  Pulse: 65  Resp: 18  SpO2: 98 %  BP: 110/60  BP Location: Left arm  Patient Position: Sitting  Pain Score: 0-No pain  Height and Weight  Height: 5' 2" (157.5 cm)  Weight: 78.8 kg (173 lb 11.6 oz)  BSA (Calculated - sq m): 1.86 sq meters  BMI (Calculated): 31.8  Weight in (lb) to have BMI = 25: 136.4      Patient Position: Sitting      Physical Exam  Vitals reviewed.   Constitutional:       General: She is not in acute distress.     Appearance: Normal appearance. She is not ill-appearing, toxic-appearing or diaphoretic.   HENT:      Head: Normocephalic and atraumatic.      Right Ear: Tympanic membrane normal.      Left Ear: Tympanic membrane normal.      Mouth/Throat:      Mouth: Mucous membranes are moist.      Pharynx: No posterior oropharyngeal erythema.      Comments: NTTP when palpate teeth/inner jaw.  + TTP over Left mandible- mild swellign. + Submandibular gland enlargement and TTP.  Trace swelling to Left parotid.  No drainage from stensen's duct.   Eyes:      Extraocular Movements: Extraocular movements intact.      Conjunctiva/sclera: Conjunctivae normal.      Pupils: Pupils are equal, round, and reactive to light.   Neurological:      General: No focal deficit present.      Mental Status: She is alert and oriented to person, place, and time.   Psychiatric:         Mood and Affect: Mood normal.         Behavior: Behavior normal.          Laboratory:  CBC:  Recent Labs "   Lab 02/06/23  0926 11/27/23  1838 11/28/23  0347   WBC 4.09 5.91 5.92   RBC 4.48 4.65 4.39   Hemoglobin 13.9 14.1 13.7   Hematocrit 41.1 42.0 39.0   Platelets 159 206 181   MCV 92 90 89   MCH 31.0 30.3 31.2 H   MCHC 33.8 33.6 35.1       CMP:  Recent Labs   Lab 08/08/23  1340 08/23/23  0959 11/27/23  1838 11/28/23  0347   Glucose 140 H   < > 133 H 171 H   Calcium 10.2   < > 10.2 9.7   Albumin 4.1  --  4.0 3.6   Total Protein 8.0  --  8.2 7.4   Sodium 136   < > 136 137   Potassium 3.4 L   < > 3.5 3.4 L   CO2 23   < > 27 25   Chloride 101   < > 99 102   BUN 16   < > 19 19   Creatinine 1.1   < > 1.0 0.9   Alkaline Phosphatase 97  --  88 83   ALT 29  --  28 28   AST 33  --  29 28   Total Bilirubin 0.5  --  0.3 0.2    < > = values in this interval not displayed.       URINALYSIS:  Recent Labs   Lab 11/09/23  1037 11/09/23  1039   Color, UA Yellow Colorless A   Clarity, UA Clear  --    Specific Gravity, UA  --  1.010   Spec Grav UA 1.010  --    pH, UA 7 7.0   Protein, UA  --  Negative   Nitrite, UA negative Negative   Leukocytes, UA  --  1+ A   Urobilinogen, UA normal  --         LIPIDS:  Recent Labs   Lab 07/29/21  0919 01/21/22  0824 08/03/22  1024 02/06/23  0926   TSH  --  2.23  --   --    HDL 44  --  44 41   Cholesterol 143  --  161 143   Triglycerides 82  --  101 82   LDL Cholesterol 82.6  --  96.8 85.6   HDL/Cholesterol Ratio 30.8  --  27.3 28.7   Non-HDL Cholesterol 99  --  117 102   Total Cholesterol/HDL Ratio 3.3  --  3.7 3.5       TSH:  Recent Labs   Lab 01/21/22  0824   TSH 2.23       A1C:  Recent Labs   Lab 07/29/21  0919 08/03/22  1024 02/06/23  0926 08/08/23  1340 11/14/23  0939   Hemoglobin A1C 6.1 H 6.4 H 6.4 H 6.6 H 6.1 H       Urine Microalbumin/Cr:  Recent Labs   Lab 11/09/23  1035   Microalb/Creat Ratio 22.9          Assessment/Plan     Jasmin Alfaro is a 78 y.o.female with:    Primary hypertension  - Controlled.  Cont current.     Type 2 diabetes mellitus without complication, without  long-term current use of insulin  - Stable.  Cont current regimen.    Hyperlipidemia, unspecified hyperlipidemia type  - Controlled.  Cont current.     Hypokalemia  -     Basic Metabolic Panel; Future; Expected date: 12/15/2023  -     Magnesium; Future; Expected date: 12/15/2023  - Stable.  Cont current regimen. Repeat BMP, Mg.     Parotitis  -  Suspect likely due to COVID.  Rec warm or cool compresses. Tylenol prn. Alert MD if Sx's persist.     Fatty liver  - Rec limit tylenol and alcohol.  Rec diet and exercise for wt loss.  As discussed at visit there is a potential for cirrhosis.      NIK (obstructive sleep apnea)  - Stable.  Cont current regimen.    Cervical radiculopathy  - Stable.  Cont current regimen.    Gastroesophageal reflux disease, unspecified whether esophagitis present  - Stable.  Cont current regimen.    Multiple thyroid nodules  - Stable.  Cont current regimen.    Osteopenia, unspecified location  - Stable.  Cont current regimen.      Chronic conditions status updated as per HPI.  Other than changes above, cont current medications and maintain follow up with specialists.        Gloria Smith MD  [facility]

## 2023-12-01 NOTE — PROGRESS NOTES
C3 nurse spoke with Jasmin Alfaro  for a TCC post hospital discharge follow up call. The patient has a scheduled HOSFU appointment with Gloria Smith MD on 12/01/2023 @ 9191.    Message sent to PCP staff.

## 2023-12-04 ENCOUNTER — TELEPHONE (OUTPATIENT)
Dept: ENDOSCOPY | Facility: HOSPITAL | Age: 78
End: 2023-12-04

## 2023-12-04 VITALS — HEIGHT: 62 IN | BODY MASS INDEX: 31.83 KG/M2 | WEIGHT: 173 LBS

## 2023-12-04 DIAGNOSIS — Z12.11 SCREENING FOR COLORECTAL CANCER: ICD-10-CM

## 2023-12-04 DIAGNOSIS — Z86.010 HISTORY OF COLON POLYPS: Primary | ICD-10-CM

## 2023-12-04 DIAGNOSIS — Z12.12 SCREENING FOR COLORECTAL CANCER: ICD-10-CM

## 2023-12-04 NOTE — TELEPHONE ENCOUNTER
Spoke to Pt to schedule procedure(s) Colonoscopy       Physician to perform procedure(s) Dr. CHICO Mcwilliams   Date of Procedure (s) 2/24/24  Arrival Time 6:30 AM  Time of Procedure(s) 7:30 AM   Location of Procedure(s) [address]  Type of Rx Prep sent to patient: PEG - Pt confirmed receipt of PEG from recent reschedule  Instructions provided to patient via [facility]    Patient was informed on the following information and verbalized understanding. Screening questionnaire reviewed with patient and complete. If procedure requires anesthesia, a responsible adult needs to be present to accompany the patient home, patient cannot drive after receiving anesthesia. Appointment details are tentative, especially check-in time. Patient will receive a prep-op call 7 days prior to confirm check-in time for procedure. If applicable the patient should contact their pharmacy to verify Rx for procedure prep is ready for pick-up. Patient was advised to call the scheduling department at [phone] if pharmacy states no Rx is available. Patient was advised to call the endoscopy scheduling department if any questions or concerns arise.      [facility] Scheduling Department

## 2023-12-08 DIAGNOSIS — E87.6 HYPOKALEMIA: ICD-10-CM

## 2023-12-08 RX ORDER — POTASSIUM CHLORIDE 750 MG/1
TABLET, EXTENDED RELEASE ORAL
Qty: 36 TABLET | Refills: 3 | Status: SHIPPED | OUTPATIENT
Start: 2023-12-08 | End: 2023-12-16 | Stop reason: SDUPTHER

## 2023-12-08 NOTE — TELEPHONE ENCOUNTER
No care due was identified.  [facility] Embedded Care Due Messages. Reference number: [id number].   12/08/2023 2:59:37 AM CST

## 2023-12-09 NOTE — TELEPHONE ENCOUNTER
Refill Decision Note   Jasmin Alfaro  is requesting a refill authorization.  Brief Assessment and Rationale for Refill:  Approve     Medication Therapy Plan:       Medication Reconciliation Completed: No   Comments:     No Care Gaps recommended.     Note composed:7:40 PM 12/08/2023

## 2023-12-12 NOTE — TELEPHONE ENCOUNTER
Refill Decision Note   Jasmin Alfaro  is requesting a refill authorization.  Brief Assessment and Rationale for Refill:  Approve     Medication Therapy Plan:         Comments:     Note composed:8:08 AM 10/24/2023           
No care due was identified.  [facility] Embedded Care Due Messages. Reference number: [id number].   10/23/2023 10:21:44 AM CDT  
179.8

## 2023-12-13 ENCOUNTER — OFFICE VISIT (OUTPATIENT)
Dept: CARDIOLOGY | Facility: CLINIC | Age: 78
End: 2023-12-13
Payer: MEDICARE

## 2023-12-13 VITALS
WEIGHT: 175.5 LBS | BODY MASS INDEX: 32.3 KG/M2 | SYSTOLIC BLOOD PRESSURE: 122 MMHG | HEART RATE: 67 BPM | DIASTOLIC BLOOD PRESSURE: 68 MMHG | OXYGEN SATURATION: 99 % | HEIGHT: 62 IN

## 2023-12-13 DIAGNOSIS — E78.5 HYPERLIPIDEMIA, UNSPECIFIED HYPERLIPIDEMIA TYPE: ICD-10-CM

## 2023-12-13 DIAGNOSIS — I70.0 AORTIC ATHEROSCLEROSIS: Primary | ICD-10-CM

## 2023-12-13 DIAGNOSIS — I10 PRIMARY HYPERTENSION: ICD-10-CM

## 2023-12-13 DIAGNOSIS — R07.9 CHEST PAIN: ICD-10-CM

## 2023-12-13 DIAGNOSIS — I35.8 MILD AORTIC VALVE SCLEROSIS: ICD-10-CM

## 2023-12-13 PROCEDURE — 1101F PT FALLS ASSESS-DOCD LE1/YR: CPT | Mod: HCNC,CPTII,GC,S$GLB | Performed by: STUDENT IN AN ORGANIZED HEALTH CARE EDUCATION/TRAINING PROGRAM

## 2023-12-13 PROCEDURE — 3288F FALL RISK ASSESSMENT DOCD: CPT | Mod: HCNC,CPTII,GC,S$GLB | Performed by: STUDENT IN AN ORGANIZED HEALTH CARE EDUCATION/TRAINING PROGRAM

## 2023-12-13 PROCEDURE — 3288F PR FALLS RISK ASSESSMENT DOCUMENTED: ICD-10-PCS | Mod: HCNC,CPTII,GC,S$GLB | Performed by: STUDENT IN AN ORGANIZED HEALTH CARE EDUCATION/TRAINING PROGRAM

## 2023-12-13 PROCEDURE — 1159F PR MEDICATION LIST DOCUMENTED IN MEDICAL RECORD: ICD-10-PCS | Mod: HCNC,CPTII,GC,S$GLB | Performed by: STUDENT IN AN ORGANIZED HEALTH CARE EDUCATION/TRAINING PROGRAM

## 2023-12-13 PROCEDURE — 99204 PR OFFICE/OUTPT VISIT, NEW, LEVL IV, 45-59 MIN: ICD-10-PCS | Mod: HCNC,GC,S$GLB, | Performed by: STUDENT IN AN ORGANIZED HEALTH CARE EDUCATION/TRAINING PROGRAM

## 2023-12-13 PROCEDURE — 99999 PR PBB SHADOW E&M-EST. PATIENT-LVL IV: ICD-10-PCS | Mod: PBBFAC,HCNC,GC, | Performed by: STUDENT IN AN ORGANIZED HEALTH CARE EDUCATION/TRAINING PROGRAM

## 2023-12-13 PROCEDURE — 3078F DIAST BP <80 MM HG: CPT | Mod: HCNC,CPTII,GC,S$GLB | Performed by: STUDENT IN AN ORGANIZED HEALTH CARE EDUCATION/TRAINING PROGRAM

## 2023-12-13 PROCEDURE — 99999 PR PBB SHADOW E&M-EST. PATIENT-LVL IV: CPT | Mod: PBBFAC,HCNC,GC, | Performed by: STUDENT IN AN ORGANIZED HEALTH CARE EDUCATION/TRAINING PROGRAM

## 2023-12-13 PROCEDURE — 3078F PR MOST RECENT DIASTOLIC BLOOD PRESSURE < 80 MM HG: ICD-10-PCS | Mod: HCNC,CPTII,GC,S$GLB | Performed by: STUDENT IN AN ORGANIZED HEALTH CARE EDUCATION/TRAINING PROGRAM

## 2023-12-13 PROCEDURE — 99204 OFFICE O/P NEW MOD 45 MIN: CPT | Mod: HCNC,GC,S$GLB, | Performed by: STUDENT IN AN ORGANIZED HEALTH CARE EDUCATION/TRAINING PROGRAM

## 2023-12-13 PROCEDURE — 3074F PR MOST RECENT SYSTOLIC BLOOD PRESSURE < 130 MM HG: ICD-10-PCS | Mod: HCNC,CPTII,GC,S$GLB | Performed by: STUDENT IN AN ORGANIZED HEALTH CARE EDUCATION/TRAINING PROGRAM

## 2023-12-13 PROCEDURE — 3074F SYST BP LT 130 MM HG: CPT | Mod: HCNC,CPTII,GC,S$GLB | Performed by: STUDENT IN AN ORGANIZED HEALTH CARE EDUCATION/TRAINING PROGRAM

## 2023-12-13 PROCEDURE — 1101F PR PT FALLS ASSESS DOC 0-1 FALLS W/OUT INJ PAST YR: ICD-10-PCS | Mod: HCNC,CPTII,GC,S$GLB | Performed by: STUDENT IN AN ORGANIZED HEALTH CARE EDUCATION/TRAINING PROGRAM

## 2023-12-13 PROCEDURE — 1159F MED LIST DOCD IN RCRD: CPT | Mod: HCNC,CPTII,GC,S$GLB | Performed by: STUDENT IN AN ORGANIZED HEALTH CARE EDUCATION/TRAINING PROGRAM

## 2023-12-13 NOTE — PROGRESS NOTES
"    PCP - Gloria Smith MD  Referring Physician:     Subjective:   Patient ID:  Jasmin Alfaro is a 78 y.o. female with past medical history of:   HTN  HLD  Mild AS    Recent evaluation for chest pain. Troponin negative x3.  Stress Echo was negative. Today, she states that she intermittently gets substernal chest "heaviness." This started happening after she got COVID several weeks ago. Denies shortness of breath, palpitations, lower extremity edema. Her pain is not brought on by exertion or relieved by rest. Nothing seems to make it better or worse. She denies any history of heart problems. She states she has been on blood pressure medications for "years."    History:     Social History     Tobacco Use    Smoking status: Never    Smokeless tobacco: Never   Substance Use Topics    Alcohol use: Not Currently     Family History   Problem Relation Age of Onset    Hypertension Mother     Cancer Mother         small intestines    Heart failure Mother     Hypertension Father     Hypertension Sister     Kidney failure Sister         ESRD on HD    Diabetes type I Sister     Hypothyroidism Sister     Hypertension Brother     Colon cancer Brother     Heart failure Brother     Kidney failure Brother         ESRD on HD       Meds:     Review of patient's allergies indicates:   Allergen Reactions    Meperidine Hives and Other (See Comments)     Unknown reaction         Current Outpatient Medications:     amLODIPine (NORVASC) 10 MG tablet, Take 1 tablet (10 mg total) by mouth once daily., Disp: 90 tablet, Rfl: 1    aspirin 81 MG Chew, Take 81 mg by mouth once daily. , Disp: , Rfl:     atenoloL (TENORMIN) 50 MG tablet, Take 1 tablet (50 mg total) by mouth once daily., Disp: 90 tablet, Rfl: 1    azelastine (ASTELIN) 137 mcg (0.1 %) nasal spray, 1 spray (137 mcg total) by Nasal route 2 (two) times daily. As needed, Disp: 30 mL, Rfl: 3    blood sugar diagnostic Strp, To check BG twice daily, to use with TrueMetrix " "meter, Disp: 200 strip, Rfl: 3    blood-glucose meter kit, To check BG once daily, to use with insurance preferred meter, Disp: 1 each, Rfl: 0    diclofenac sodium (VOLTAREN) 1 % Gel, Apply 2 g topically 4 (four) times daily. As needed, Disp: 100 g, Rfl: 1    EPINEPHrine (EPIPEN 2-YAA) 0.3 mg/0.3 mL AtIn, Inject 0.3 mLs (0.3 mg total) into the muscle as needed., Disp: 1 each, Rfl: 0    fluticasone propionate (FLONASE) 50 mcg/actuation nasal spray, 2 sprays (100 mcg total) by Each Nostril route once daily., Disp: 48 g, Rfl: 3    gabapentin (NEURONTIN) 100 MG capsule, TAKE 1 CAPSULE THREE TIMES DAILY (Patient taking differently: As needed), Disp: 90 capsule, Rfl: 2    hydroCHLOROthiazide (HYDRODIURIL) 25 MG tablet, Take 1 tablet (25 mg total) by mouth once daily., Disp: 90 tablet, Rfl: 1    hydrocortisone (ANUSOL-HC) 2.5 % rectal cream, Place rectally 2 (two) times daily., Disp: 28 g, Rfl: 1    lancets Misc, To check BG twice daily, to use with insurance preferred meter, Disp: 200 each, Rfl: 3    loratadine (CLARITIN) 10 mg tablet, Take 10 mg by mouth daily as needed. , Disp: , Rfl:     multivit with minerals/lutein (MULTIVITAMIN 50 PLUS ORAL), Take by mouth once daily. , Disp: , Rfl:     omeprazole (PRILOSEC) 20 MG capsule, TAKE 1 CAPSULE ONE TIME DAILY, Disp: 90 capsule, Rfl: 3    potassium chloride SA (K-DUR,KLOR-CON M) 10 MEQ tablet, TAKE 1 TABLET THREE TIMES WEEKLY ON MON, WED AND FRI, Disp: 36 tablet, Rfl: 3    TRUE METRIX GLUCOSE METER Misc, USE TO TEST BLOOD GLUCOSE ONCE DAILY, Disp: , Rfl:     TRUEPLUS LANCETS 33 gauge Misc, , Disp: , Rfl:     rosuvastatin (CRESTOR) 5 MG tablet, TAKE 1 TABLET ONE TIME DAILY (Patient not taking: Reported on 12/13/2023), Disp: 90 tablet, Rfl: 1      Objective:   /68   Pulse 67   Ht 5' 2" (1.575 m)   Wt 79.6 kg (175 lb 7.8 oz)   SpO2 99%   BMI 32.10 kg/m²     Physical Exam  Gen: No apparent distress, resting comfortably  HEENT: Pupils equal and reactive to " light  Cardio: Regular rate, point of maximal impulse not displaced, no murmur noted, 2+ radial pulses bilaterally, 2+ DP pulses bilaterally  Resp: CTAB, no wheezing  Abd: Soft, non-tender, non-distended  Skin: Warm, dry, no peripheral edema noted  Neuro: Alert and oriented x3  Psych: Normal mood and affect      Labs:     Lab Results   Component Value Date     11/28/2023    K 3.4 (L) 11/28/2023     11/28/2023    CO2 25 11/28/2023    BUN 19 11/28/2023    CREATININE 0.9 11/28/2023    ANIONGAP 10 11/28/2023     Lab Results   Component Value Date    HGBA1C 6.1 (H) 11/14/2023     Lab Results   Component Value Date     (H) 11/27/2023       Lab Results   Component Value Date    WBC 5.92 11/28/2023    HGB 13.7 11/28/2023    HCT 39.0 11/28/2023     11/28/2023    GRAN 3.5 11/28/2023    GRAN 59.8 11/28/2023     Lab Results   Component Value Date    CHOL 143 02/06/2023    HDL 41 02/06/2023    LDLCALC 85.6 02/06/2023    TRIG 82 02/06/2023       Lab Results   Component Value Date     11/28/2023    K 3.4 (L) 11/28/2023     11/28/2023    CO2 25 11/28/2023    BUN 19 11/28/2023    CREATININE 0.9 11/28/2023    ANIONGAP 10 11/28/2023     Lab Results   Component Value Date    HGBA1C 6.1 (H) 11/14/2023     Lab Results   Component Value Date     (H) 11/27/2023    Lab Results   Component Value Date    WBC 5.92 11/28/2023    HGB 13.7 11/28/2023    HCT 39.0 11/28/2023     11/28/2023    GRAN 3.5 11/28/2023    GRAN 59.8 11/28/2023     Lab Results   Component Value Date    CHOL 143 02/06/2023    HDL 41 02/06/2023    LDLCALC 85.6 02/06/2023    TRIG 82 02/06/2023                Cardiovascular Imaging:     Stress Echo 11/28/23:     Left Ventricle: The left ventricle is normal in size. Ventricular mass is normal. Normal wall thickness. There is concentric remodeling. Normal wall motion. There is normal systolic function. Grade I diastolic dysfunction.    Right Ventricle: Normal right ventricular  cavity size. Systolic function is normal.    Left Atrium: Left atrium is mildly dilated.    Aortic Valve: There is mild aortic valve sclerosis. There is mild annular calcification present.    Tricuspid Valve: There is mild regurgitation.    IVC/SVC: Normal venous pressure at 3 mmHg.    Baseline ECG: The Baseline ECG reveals sinus rhythm. The axis is normal. The ST segments are normal.    Stress ECG: There are no ST segment deviation identified during the protocol. During stress, rare PACs are noted. There is normal blood pressure response with stress.    ECG Conclusion: The ECG portion of the study is negative for ischemia.    Post-stress Impression: The study is normal and negative with no echocardiographic evidence of stress induced ischemia.      LHC:    Assessment & Plan:     Chest pain  -Patient with intermittent chest heaviness not brought on by exertion or relieved by rest. Has occurred since having COVID several weeks ago.  -Recent admission with normal troponin x3, EKG without ischemic changes  -Dobutamine stress echo was negative  -Etiology of her pain is highly unlikely to be coronary. Possible etiologies include GI vs musculoskeletal    Aortic atherosclerosis  -Continue statin and ASA 81 mg qd    Hypertension  -BP controlled on current regimen and she should continue this    Hyperlipidemia  -Continue statin    RTC in 1 year. Case staffed with Dr. Stone.    Signed:  Pablito Krishnan MD  [facility] PGY-6    Staff attestation to follow.

## 2023-12-13 NOTE — ASSESSMENT & PLAN NOTE
-Patient with intermittent chest heaviness not brought on by exertion or relieved by rest. Has occurred since having COVID several weeks ago.  -Recent admission with normal troponin x3, EKG without ischemic changes  -Dobutamine stress echo was negative  -Etiology of her pain is highly unlikely to be coronary. Possible etiologies include GI vs musculoskeletal

## 2023-12-15 ENCOUNTER — LAB VISIT (OUTPATIENT)
Dept: LAB | Facility: OTHER | Age: 78
End: 2023-12-15
Attending: INTERNAL MEDICINE
Payer: MEDICARE

## 2023-12-15 DIAGNOSIS — E87.6 HYPOKALEMIA: ICD-10-CM

## 2023-12-15 LAB
ANION GAP SERPL CALC-SCNC: 12 MMOL/L (ref 8–16)
BUN SERPL-MCNC: 23 MG/DL (ref 8–23)
CALCIUM SERPL-MCNC: 10 MG/DL (ref 8.7–10.5)
CHLORIDE SERPL-SCNC: 103 MMOL/L (ref 95–110)
CO2 SERPL-SCNC: 23 MMOL/L (ref 23–29)
CREAT SERPL-MCNC: 1.1 MG/DL (ref 0.5–1.4)
EST. GFR  (NO RACE VARIABLE): 52 ML/MIN/1.73 M^2
GLUCOSE SERPL-MCNC: 106 MG/DL (ref 70–110)
MAGNESIUM SERPL-MCNC: 1.9 MG/DL (ref 1.6–2.6)
POTASSIUM SERPL-SCNC: 3.4 MMOL/L (ref 3.5–5.1)
SODIUM SERPL-SCNC: 138 MMOL/L (ref 136–145)

## 2023-12-15 PROCEDURE — 83735 ASSAY OF MAGNESIUM: CPT | Mod: HCNC | Performed by: INTERNAL MEDICINE

## 2023-12-15 PROCEDURE — 36415 COLL VENOUS BLD VENIPUNCTURE: CPT | Mod: HCNC | Performed by: INTERNAL MEDICINE

## 2023-12-15 PROCEDURE — 80048 BASIC METABOLIC PNL TOTAL CA: CPT | Mod: HCNC | Performed by: INTERNAL MEDICINE

## 2023-12-16 DIAGNOSIS — E87.6 HYPOKALEMIA: ICD-10-CM

## 2023-12-16 RX ORDER — POTASSIUM CHLORIDE 750 MG/1
TABLET, EXTENDED RELEASE ORAL
Qty: 90 TABLET | Refills: 0 | Status: SHIPPED | OUTPATIENT
Start: 2023-12-16 | End: 2024-03-20

## 2023-12-16 NOTE — PROGRESS NOTES
I sent pt a my chart message -  I reviewed your  labs.  Your Magnesium level was normal.   Your kidney function looked stable.  Your potassium remains slightly low. Rec a high potassium diet (bananas, oranges, spinach, potato, mushrooms, peas, sweet potatoes). This may be due to Hydrochlorothiazide.  Are you taking your Kcl 10 MEq Q M,W,F. IF so we should increase this to one tab daily and repeat your potassium in 2 wks.   No further recommendations at this time.    Dr. GALVAN

## 2024-01-02 ENCOUNTER — LAB VISIT (OUTPATIENT)
Dept: LAB | Facility: OTHER | Age: 79
End: 2024-01-02
Attending: INTERNAL MEDICINE
Payer: MEDICARE

## 2024-01-02 DIAGNOSIS — E87.6 HYPOKALEMIA: ICD-10-CM

## 2024-01-02 LAB
ANION GAP SERPL CALC-SCNC: 11 MMOL/L (ref 8–16)
BUN SERPL-MCNC: 16 MG/DL (ref 8–23)
CALCIUM SERPL-MCNC: 10.1 MG/DL (ref 8.7–10.5)
CHLORIDE SERPL-SCNC: 103 MMOL/L (ref 95–110)
CO2 SERPL-SCNC: 24 MMOL/L (ref 23–29)
CREAT SERPL-MCNC: 1.1 MG/DL (ref 0.5–1.4)
EST. GFR  (NO RACE VARIABLE): 52 ML/MIN/1.73 M^2
GLUCOSE SERPL-MCNC: 104 MG/DL (ref 70–110)
POTASSIUM SERPL-SCNC: 3.7 MMOL/L (ref 3.5–5.1)
SODIUM SERPL-SCNC: 138 MMOL/L (ref 136–145)

## 2024-01-02 PROCEDURE — 36415 COLL VENOUS BLD VENIPUNCTURE: CPT | Mod: HCNC | Performed by: INTERNAL MEDICINE

## 2024-01-02 PROCEDURE — 80048 BASIC METABOLIC PNL TOTAL CA: CPT | Mod: HCNC | Performed by: INTERNAL MEDICINE

## 2024-01-02 NOTE — PROGRESS NOTES
I sent pt a my chart message -  I reviewed your labs. Your kidney function was stable and your Potassium was normal at 3.7. Continue your Potassium daily. Happy New Year!   Dr. GALVAN

## 2024-02-09 ENCOUNTER — LAB VISIT (OUTPATIENT)
Dept: LAB | Facility: OTHER | Age: 79
End: 2024-02-09
Attending: INTERNAL MEDICINE
Payer: MEDICARE

## 2024-02-09 DIAGNOSIS — E11.9 TYPE 2 DIABETES MELLITUS WITHOUT COMPLICATION, WITHOUT LONG-TERM CURRENT USE OF INSULIN: ICD-10-CM

## 2024-02-09 DIAGNOSIS — E78.5 HYPERLIPIDEMIA, UNSPECIFIED HYPERLIPIDEMIA TYPE: ICD-10-CM

## 2024-02-09 LAB
ALBUMIN SERPL BCP-MCNC: 3.9 G/DL (ref 3.5–5.2)
ALP SERPL-CCNC: 74 U/L (ref 55–135)
ALT SERPL W/O P-5'-P-CCNC: 22 U/L (ref 10–44)
ANION GAP SERPL CALC-SCNC: 11 MMOL/L (ref 8–16)
AST SERPL-CCNC: 28 U/L (ref 10–40)
BILIRUB SERPL-MCNC: 0.6 MG/DL (ref 0.1–1)
BUN SERPL-MCNC: 22 MG/DL (ref 8–23)
CALCIUM SERPL-MCNC: 9.3 MG/DL (ref 8.7–10.5)
CHLORIDE SERPL-SCNC: 101 MMOL/L (ref 95–110)
CHOLEST SERPL-MCNC: 178 MG/DL (ref 120–199)
CHOLEST/HDLC SERPL: 4.3 {RATIO} (ref 2–5)
CO2 SERPL-SCNC: 24 MMOL/L (ref 23–29)
CREAT SERPL-MCNC: 1.1 MG/DL (ref 0.5–1.4)
EST. GFR  (NO RACE VARIABLE): 52 ML/MIN/1.73 M^2
ESTIMATED AVG GLUCOSE: 126 MG/DL (ref 68–131)
GLUCOSE SERPL-MCNC: 125 MG/DL (ref 70–110)
HBA1C MFR BLD: 6 % (ref 4–5.6)
HDLC SERPL-MCNC: 41 MG/DL (ref 40–75)
HDLC SERPL: 23 % (ref 20–50)
LDLC SERPL CALC-MCNC: 116.8 MG/DL (ref 63–159)
NONHDLC SERPL-MCNC: 137 MG/DL
POTASSIUM SERPL-SCNC: 3.7 MMOL/L (ref 3.5–5.1)
PROT SERPL-MCNC: 7.6 G/DL (ref 6–8.4)
SODIUM SERPL-SCNC: 136 MMOL/L (ref 136–145)
TRIGL SERPL-MCNC: 101 MG/DL (ref 30–150)

## 2024-02-09 PROCEDURE — 83036 HEMOGLOBIN GLYCOSYLATED A1C: CPT | Mod: HCNC | Performed by: INTERNAL MEDICINE

## 2024-02-09 PROCEDURE — 80061 LIPID PANEL: CPT | Mod: HCNC | Performed by: INTERNAL MEDICINE

## 2024-02-09 PROCEDURE — 80053 COMPREHEN METABOLIC PANEL: CPT | Mod: HCNC | Performed by: INTERNAL MEDICINE

## 2024-02-09 PROCEDURE — 36415 COLL VENOUS BLD VENIPUNCTURE: CPT | Mod: HCNC | Performed by: INTERNAL MEDICINE

## 2024-02-10 ENCOUNTER — PATIENT MESSAGE (OUTPATIENT)
Dept: ADMINISTRATIVE | Facility: HOSPITAL | Age: 79
End: 2024-02-10
Payer: MEDICARE

## 2024-02-14 NOTE — PROGRESS NOTES
I sent pt a my chart message -  I reviewed your labs.  Your Ha1c was controlled at 6.0. Your Cholesterol looked a little high but  unfortunately you have not tolerated higher doses of Crestor. Continue your current regimen. Your kidney function and liver functions looked good. No further recommendations at this time.  Dr. GALVAN

## 2024-02-19 ENCOUNTER — TELEPHONE (OUTPATIENT)
Dept: ENDOSCOPY | Facility: HOSPITAL | Age: 79
End: 2024-02-19
Payer: MEDICARE

## 2024-02-19 ENCOUNTER — PATIENT OUTREACH (OUTPATIENT)
Dept: ADMINISTRATIVE | Facility: HOSPITAL | Age: 79
End: 2024-02-19
Payer: MEDICARE

## 2024-02-19 NOTE — PROGRESS NOTES

## 2024-02-20 ENCOUNTER — TELEPHONE (OUTPATIENT)
Dept: ENDOSCOPY | Facility: HOSPITAL | Age: 79
End: 2024-02-20
Payer: MEDICARE

## 2024-02-20 NOTE — TELEPHONE ENCOUNTER
Spoke to pt and confirmed appt details, arrival time. Pt reviewed meds she should take morning of procedure.

## 2024-02-21 ENCOUNTER — TELEPHONE (OUTPATIENT)
Dept: ENDOSCOPY | Facility: HOSPITAL | Age: 79
End: 2024-02-21
Payer: MEDICARE

## 2024-02-21 NOTE — TELEPHONE ENCOUNTER
Pt called to see about getting another copy of her Colonoscopy prep instructions. Sent copy to portal and to pt's email. Reviewed how to find previously read messages. Encouraged pt to call back for any needs.

## 2024-02-24 ENCOUNTER — HOSPITAL ENCOUNTER (OUTPATIENT)
Facility: HOSPITAL | Age: 79
Discharge: HOME | End: 2024-02-24
Attending: INTERNAL MEDICINE | Admitting: INTERNAL MEDICINE
Payer: MEDICARE

## 2024-02-24 ENCOUNTER — ANESTHESIA EVENT (OUTPATIENT)
Dept: ENDOSCOPY | Facility: HOSPITAL | Age: 79
End: 2024-02-24
Payer: MEDICARE

## 2024-02-24 ENCOUNTER — ANESTHESIA (OUTPATIENT)
Dept: ENDOSCOPY | Facility: HOSPITAL | Age: 79
End: 2024-02-24
Payer: MEDICARE

## 2024-02-24 VITALS
BODY MASS INDEX: 31.28 KG/M2 | OXYGEN SATURATION: 100 % | RESPIRATION RATE: 18 BRPM | DIASTOLIC BLOOD PRESSURE: 59 MMHG | WEIGHT: 170 LBS | HEART RATE: 62 BPM | SYSTOLIC BLOOD PRESSURE: 124 MMHG | TEMPERATURE: 98 F | HEIGHT: 62 IN

## 2024-02-24 DIAGNOSIS — Z86.010 HISTORY OF COLON POLYPS: Primary | ICD-10-CM

## 2024-02-24 PROCEDURE — 88342 IMHCHEM/IMCYTCHM 1ST ANTB: CPT | Mod: 26,HCNC,, | Performed by: PATHOLOGY

## 2024-02-24 PROCEDURE — 37000009 HC ANESTHESIA EA ADD 15 MINS: Mod: HCNC | Performed by: INTERNAL MEDICINE

## 2024-02-24 PROCEDURE — 45380 COLONOSCOPY AND BIOPSY: CPT | Mod: PT,HCNC,, | Performed by: INTERNAL MEDICINE

## 2024-02-24 PROCEDURE — 27201012 HC FORCEPS, HOT/COLD, DISP: Mod: HCNC | Performed by: INTERNAL MEDICINE

## 2024-02-24 PROCEDURE — E9220 PRA ENDO ANESTHESIA: HCPCS | Mod: PT,HCNC,, | Performed by: NURSE ANESTHETIST, CERTIFIED REGISTERED

## 2024-02-24 PROCEDURE — 45380 COLONOSCOPY AND BIOPSY: CPT | Mod: PT,HCNC | Performed by: INTERNAL MEDICINE

## 2024-02-24 PROCEDURE — 88342 IMHCHEM/IMCYTCHM 1ST ANTB: CPT | Mod: HCNC | Performed by: PATHOLOGY

## 2024-02-24 PROCEDURE — 88305 TISSUE EXAM BY PATHOLOGIST: CPT | Mod: 26,HCNC,, | Performed by: PATHOLOGY

## 2024-02-24 PROCEDURE — 37000008 HC ANESTHESIA 1ST 15 MINUTES: Mod: HCNC | Performed by: INTERNAL MEDICINE

## 2024-02-24 PROCEDURE — 88305 TISSUE EXAM BY PATHOLOGIST: CPT | Mod: HCNC | Performed by: PATHOLOGY

## 2024-02-24 PROCEDURE — 63600175 PHARM REV CODE 636 W HCPCS: Mod: HCNC | Performed by: NURSE ANESTHETIST, CERTIFIED REGISTERED

## 2024-02-24 PROCEDURE — 25000003 PHARM REV CODE 250: Mod: HCNC | Performed by: NURSE ANESTHETIST, CERTIFIED REGISTERED

## 2024-02-24 RX ORDER — LIDOCAINE HYDROCHLORIDE 20 MG/ML
INJECTION INTRAVENOUS
Status: DISCONTINUED | OUTPATIENT
Start: 2024-02-24 | End: 2024-02-24

## 2024-02-24 RX ORDER — PROPOFOL 10 MG/ML
VIAL (ML) INTRAVENOUS CONTINUOUS PRN
Status: DISCONTINUED | OUTPATIENT
Start: 2024-02-24 | End: 2024-02-24

## 2024-02-24 RX ORDER — PROPOFOL 10 MG/ML
VIAL (ML) INTRAVENOUS
Status: DISCONTINUED | OUTPATIENT
Start: 2024-02-24 | End: 2024-02-24

## 2024-02-24 RX ORDER — SODIUM CHLORIDE 9 MG/ML
INJECTION, SOLUTION INTRAVENOUS CONTINUOUS
Status: DISCONTINUED | OUTPATIENT
Start: 2024-02-24 | End: 2024-02-24 | Stop reason: HOSPADM

## 2024-02-24 RX ORDER — SODIUM CHLORIDE 9 MG/ML
INJECTION, SOLUTION INTRAVENOUS CONTINUOUS PRN
Status: DISCONTINUED | OUTPATIENT
Start: 2024-02-24 | End: 2024-02-24

## 2024-02-24 RX ADMIN — LIDOCAINE HYDROCHLORIDE 40 MG: 20 INJECTION INTRAVENOUS at 07:02

## 2024-02-24 RX ADMIN — LIDOCAINE HYDROCHLORIDE 20 MG: 20 INJECTION INTRAVENOUS at 08:02

## 2024-02-24 RX ADMIN — PROPOFOL 7 MG: 10 INJECTION, EMULSION INTRAVENOUS at 07:02

## 2024-02-24 RX ADMIN — SODIUM CHLORIDE: 0.9 INJECTION, SOLUTION INTRAVENOUS at 07:02

## 2024-02-24 RX ADMIN — PROPOFOL 150 MCG/KG/MIN: 10 INJECTION, EMULSION INTRAVENOUS at 07:02

## 2024-02-24 RX ADMIN — GLYCOPYRROLATE 0.2 MG: 0.2 INJECTION, SOLUTION INTRAMUSCULAR; INTRAVENOUS at 08:02

## 2024-02-24 NOTE — ANESTHESIA POSTPROCEDURE EVALUATION
Anesthesia Post Evaluation    Patient: Jasmin Almonte    Procedure(s) Performed: Procedure(s) (LRB):  COLONOSCOPY (N/A)    Final Anesthesia Type: general      Patient location during evaluation: PACU  Patient participation: Yes- Able to Participate  Level of consciousness: awake and alert  Post-procedure vital signs: reviewed and stable  Pain management: adequate  Airway patency: patent    PONV status at discharge: No PONV  Anesthetic complications: no      Cardiovascular status: blood pressure returned to baseline  Respiratory status: unassisted  Hydration status: euvolemic  Follow-up not needed.              Vitals Value Taken Time   /59 02/24/24 0828   Temp 36.6 °C (97.9 °F) 02/24/24 0814   Pulse 62 02/24/24 0828   Resp 14 02/24/24 0828   SpO2 100 % 02/24/24 0828         Event Time   Out of Recovery 08:46:50         Pain/Minerva Score: Minerva Score: 10 (2/24/2024  8:29 AM)

## 2024-02-24 NOTE — TRANSFER OF CARE
"Anesthesia Transfer of Care Note    Patient: Jasmin AlfaroLydiaEthan    Procedure(s) Performed: Procedure(s) (LRB):  COLONOSCOPY (N/A)    Patient location: PACU    Anesthesia Type: general    Transport from OR: Transported from OR on 6-10 L/min O2 by face mask with adequate spontaneous ventilation    Post pain: adequate analgesia    Post assessment: no apparent anesthetic complications and tolerated procedure well    Post vital signs: stable    Level of consciousness: sedated    Nausea/Vomiting: no nausea/vomiting    Complications: none    Transfer of care protocol was followed      Last vitals: Visit Vitals  BP (!) 100/55 (BP Location: Left arm, Patient Position: Lying)   Pulse 61   Temp 36.6 °C (97.9 °F) (Tympanic)   Resp 18   Ht 5' 2" (1.575 m)   Wt 77.1 kg (170 lb)   SpO2 100%   Breastfeeding No   BMI 31.09 kg/m²     "

## 2024-02-24 NOTE — ANESTHESIA PREPROCEDURE EVALUATION
[facility]  Anesthesia Pre-Operative Evaluation       Patient Name: Jasmin Almonte  YOB: 1945  MRN: [id number]  [facility]: [id number]      Code Status: Prior   Date of Procedure: 2/24/2024  Procedure: Procedure(s) (LRB):  COLONOSCOPY (N/A)  Anesthesia: Choice  Pre-Operative Diagnosis: Final diagnoses:  None  Proceduralist/Surgeon(s) and Role:     * Kat Mcwilliams MD - Primary    SUBJECTIVE:   Jasmin Almonte is a 78 y.o. female w/ a significant PMHx listed below.    Patient now presents for the above procedure(s). Pt appropriately NPO.     LDA:       Anesthesia Evaluation      Airway   Mallampati: II  TM distance: Normal  Neck ROM: Normal ROM  Dental    (+) Intact and Partial Dentures    Pulmonary    (+) sleep apnea on CPAP  Cardiovascular   Exercise tolerance: good  (+) hypertension well controlled    ECG reviewed  Rate: Normal    Neuro/Psych      GI/Hepatic/Renal    (+) liver disease, bowel prep    Endo/Other    (+) diabetes mellitus  Abdominal                     ALLERGIES:     Review of patient's allergies indicates:   Allergen Reactions    Meperidine Hives and Other (See Comments)     Unknown reaction       MEDICATIONS:     Current Outpatient Medications on File Prior to Encounter   Medication Sig Dispense Refill Last Dose    amLODIPine (NORVASC) 10 MG tablet Take 1 tablet (10 mg total) by mouth once daily. 90 tablet 1     aspirin 81 MG Chew Take 81 mg by mouth once daily.        atenoloL (TENORMIN) 50 MG tablet Take 1 tablet (50 mg total) by mouth once daily. 90 tablet 1     azelastine (ASTELIN) 137 mcg (0.1 %) nasal spray 1 spray (137 mcg total) by Nasal route 2 (two) times daily. As needed 30 mL 3     blood sugar diagnostic Strp To check BG twice daily, to use with TrueMetrix meter 200 strip 3     blood-glucose meter kit To check BG once daily, to use with insurance preferred meter 1 each 0     diclofenac sodium (VOLTAREN) 1 % Gel Apply 2 g topically 4  (four) times daily. As needed 100 g 1     EPINEPHrine (EPIPEN 2-YAA) 0.3 mg/0.3 mL AtIn Inject 0.3 mLs (0.3 mg total) into the muscle as needed. 1 each 0     fluticasone propionate (FLONASE) 50 mcg/actuation nasal spray 2 sprays (100 mcg total) by Each Nostril route once daily. 48 g 3     gabapentin (NEURONTIN) 100 MG capsule TAKE 1 CAPSULE THREE TIMES DAILY (Patient taking differently: As needed) 90 capsule 2     hydroCHLOROthiazide (HYDRODIURIL) 25 MG tablet Take 1 tablet (25 mg total) by mouth once daily. 90 tablet 1     hydrocortisone (ANUSOL-HC) 2.5 % rectal cream Place rectally 2 (two) times daily. 28 g 1     lancets Misc To check BG twice daily, to use with insurance preferred meter 200 each 3     loratadine (CLARITIN) 10 mg tablet Take 10 mg by mouth daily as needed.        multivit with minerals/lutein (MULTIVITAMIN 50 PLUS ORAL) Take by mouth once daily.        omeprazole (PRILOSEC) 20 MG capsule TAKE 1 CAPSULE ONE TIME DAILY 90 capsule 3     rosuvastatin (CRESTOR) 5 MG tablet TAKE 1 TABLET ONE TIME DAILY (Patient not taking: Reported on 12/13/2023) 90 tablet 1     TRUE METRIX GLUCOSE METER Misc USE TO TEST BLOOD GLUCOSE ONCE DAILY       TRUEPLUS LANCETS 33 gauge Misc         Current Facility-Administered Medications   Medication Dose Route Frequency Provider Last Rate Last Admin    0.9%  NaCl infusion   Intravenous Continuous Kat Mcwilliams MD              History:   There are no hospital problems to display for this patient.    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    Chronic pain of left knee    Knee instability, right    Heel pain, bilateral    Popliteal pain    Osteoarthritis    Snoring    Gastroesophageal reflux disease    Leg cramp    NIK  "(obstructive sleep apnea)    Chronic kidney disease, stage 3a    Aortic atherosclerosis    Fatty liver    Chest pain    Type 2 diabetes mellitus without complication, without long-term current use of insulin    Hypokalemia    Parotitis    Cervical radiculopathy      Past Medical History:   Diagnosis Date    Allergic rhinitis     Anxiety     Aortic atherosclerosis     CKD (chronic kidney disease)     Colon polyps     COVID-19 11/25/2020    DDD (degenerative disc disease), cervical     DVT (deep venous thrombosis)     Hyperlipidemia     Hypertension     Multiple thyroid nodules     Prediabetes     Pulmonary emboli      Past Surgical History:   Procedure Laterality Date    APPENDECTOMY      BIOPSY OF THYROID      HYSTERECTOMY      INCISIONAL HERNIA REPAIR      LAPAROTOMY       Alcohol use:    Social History     Substance and Sexual Activity   Alcohol Use Not Currently          OBJECTIVE:   Last 3 sets of Vitals        12/1/2023     7:42 AM 12/4/2023    12:08 PM 12/13/2023     8:17 AM   Vitals - 1 value per visit   SYSTOLIC 110  122   DIASTOLIC 60  68   Pulse 65  67   Temp 36.6 °C (97.8 °F)     Resp 18     SPO2 98 %  99 %   Weight (lb) 173.72 173 175.49   Weight (kg) 78.8 78.472 79.6   Height 5' 2" (1.575 m) 5' 2" (1.575 m) 5' 2" (1.575 m)   BMI (Calculated) 31.8 31.6 32.1   Pain Score Zero         Vital Signs (Most Recent):    Vital Signs Range (Last 24H):        No intake or output data in the 24 hours ending 02/24/24 0713    There is no height or weight on file to calculate BMI.     Significant Labs:  Lab Results   Component Value Date    WBC 5.92 11/28/2023    HGB 13.7 11/28/2023    HCT 39.0 11/28/2023     11/28/2023    CHOL 178 02/09/2024    TRIG 101 02/09/2024    HDL 41 02/09/2024    ALT 22 02/09/2024    AST 28 02/09/2024     02/09/2024    K 3.7 02/09/2024     02/09/2024    CREATININE 1.1 02/09/2024    BUN 22 02/09/2024    CO2 24 02/09/2024    TSH 2.23 01/21/2022    INR 1.0 05/01/2007    HGBA1C " "6.0 (H) 02/09/2024     No results found for: "PREGTESTUR", "PREGSERUM", "HCG", "HCGQUANT"   No LMP recorded. Patient is postmenopausal.    EKG:   Results for orders placed or performed during the hospital encounter of 11/27/23   EKG 12-lead    Collection Time: 11/27/23  4:31 PM    Narrative    Test Reason : R07.9,    Vent. Rate : 081 BPM     Atrial Rate : 065 BPM     P-R Int : 192 ms          QRS Dur : 082 ms      QT Int : 414 ms       P-R-T Axes : 062 054 046 degrees     QTc Int : 480 ms    Sinus rhythm with Premature supraventricular complexes  Nonspecific T wave abnormality  Abnormal ECG  When compared with ECG of 27-NOV-2023 15:06,  Minimal  Nonspecific T wave abnormality is now present  Confirmed by Gianluca Loyd MD ([id number]) on 11/28/2023 5:20:34 PM    Referred By: [email or web address]   SELF           Confirmed By:Gianluca Loyd MD       TTE:  No results found for this or any previous visit.  No results found for: "EF"  No results found for this or any previous visit.  TATA:  No results found for this or any previous visit.  Stress Test:  No results found for this or any previous visit.     LHC:  No results found for this or any previous visit.     PFT:  No results found for: "FEV1", "FVC", "XMS4HAG", "TLC", "DLCO"     ASSESSMENT/PLAN:                                                                                                                02/24/2024  Jasmin Almonte is a 78 y.o., female.      Pre-op Assessment    I have reviewed the Patient Summary Reports.     I have reviewed the Nursing Notes. I have reviewed the NPO Status.   I have reviewed the Medications.     Review of Systems  Anesthesia Hx:  No problems with previous Anesthesia                Social:  Non-Smoker, No Alcohol Use       Hematology/Oncology:  Hematology Normal   Oncology Normal                                   EENT/Dental:  EENT/Dental Normal           Cardiovascular:  Exercise tolerance: good   Hypertension, well controlled         "      ECG has been reviewed.                          Pulmonary:        Sleep Apnea, CPAP           Education provided regarding risk of obstructive sleep apnea  Medical reason for not educating patient about risks of obstructive sleep apnea          Renal/:  Renal/ Normal                 Hepatic/GI:  Bowel Prep.    Liver Disease,  Fatty liver          Musculoskeletal:  Musculoskeletal Normal                Neurological:  Neurology Normal                                      Endocrine:  Diabetes   Pre diabetic        Dermatological:  Skin Normal    Psych:  Psychiatric Normal                    Physical Exam  General: Well nourished, Cooperative, Alert and Oriented    Airway:  Mallampati: II   Mouth Opening: Normal  TM Distance: Normal  Tongue: Normal  Neck ROM: Normal ROM    Dental:  Intact, Partial Dentures    Chest/Lungs:  Clear to auscultation, Normal Respiratory Rate    Heart:  Rate: Normal  Rhythm: Regular Rhythm  Sounds: Normal        Anesthesia Plan  Type of Anesthesia, risks & benefits discussed:    Anesthesia Type: Gen Natural Airway  Intra-op Monitoring Plan: Standard ASA Monitors  Induction:  IV  Informed Consent: Informed consent signed with the Patient and all parties understand the risks and agree with anesthesia plan.  All questions answered.   ASA Score: 3  Day of Surgery Review of History & Physical: H&P Update referred to the surgeon/provider.  Anesthesia Plan Notes: NPO confirmed.   No history of anesthesia problems.     Ready For Surgery From Anesthesia Perspective.     .

## 2024-02-24 NOTE — H&P
Short Stay Endoscopy History and Physical    PCP - Gloria Smith MD    Procedure - Colonoscopy  ASA - per anesthesia  Mallampati - per anesthesia  History of Anesthesia problems - no  Family history Anesthesia problems - no   Plan of anesthesia - General    HPI:  This is a 78 y.o. female here for evaluation of : personal history of colon polyps      ROS:  Constitutional: No fevers, chills, No weight loss  CV: No chest pain  Pulm: No cough, No shortness of breath  GI: see HPI  Derm: No rash    Medical History:  has a past medical history of Allergic rhinitis, Anxiety, Aortic atherosclerosis, CKD (chronic kidney disease), Colon polyps, COVID-19 (11/25/2020), DDD (degenerative disc disease), cervical, DVT (deep venous thrombosis), Hyperlipidemia, Hypertension, Multiple thyroid nodules, Prediabetes, and Pulmonary emboli.    Surgical History:  has a past surgical history that includes Hysterectomy; Appendectomy; Incisional hernia repair; Laparotomy; and Biopsy of thyroid.    Family History: family history includes Cancer in her mother; Colon cancer in her brother; Diabetes type I in her sister; Heart failure in her brother and mother; Hypertension in her brother, father, mother, and sister; Hypothyroidism in her sister; Kidney failure in her brother and sister.. Otherwise no colon cancer, inflammatory bowel disease, or GI malignancies.    Social History:  reports that she has never smoked. She has never used smokeless tobacco. She reports that she does not currently use alcohol. She reports that she does not use drugs.    Review of patient's allergies indicates:   Allergen Reactions    Meperidine Hives and Other (See Comments)     Unknown reaction         Medications:   Medications Prior to Admission   Medication Sig Dispense Refill Last Dose    amLODIPine (NORVASC) 10 MG tablet Take 1 tablet (10 mg total) by mouth once daily. 90 tablet 1     aspirin 81 MG Chew Take 81 mg by mouth once daily.        atenoloL  (TENORMIN) 50 MG tablet Take 1 tablet (50 mg total) by mouth once daily. 90 tablet 1     azelastine (ASTELIN) 137 mcg (0.1 %) nasal spray 1 spray (137 mcg total) by Nasal route 2 (two) times daily. As needed 30 mL 3     blood sugar diagnostic Strp To check BG twice daily, to use with TrueMetrix meter 200 strip 3     blood-glucose meter kit To check BG once daily, to use with insurance preferred meter 1 each 0     diclofenac sodium (VOLTAREN) 1 % Gel Apply 2 g topically 4 (four) times daily. As needed 100 g 1     EPINEPHrine (EPIPEN 2-YAA) 0.3 mg/0.3 mL AtIn Inject 0.3 mLs (0.3 mg total) into the muscle as needed. 1 each 0     fluticasone propionate (FLONASE) 50 mcg/actuation nasal spray 2 sprays (100 mcg total) by Each Nostril route once daily. 48 g 3     gabapentin (NEURONTIN) 100 MG capsule TAKE 1 CAPSULE THREE TIMES DAILY (Patient taking differently: As needed) 90 capsule 2     hydroCHLOROthiazide (HYDRODIURIL) 25 MG tablet Take 1 tablet (25 mg total) by mouth once daily. 90 tablet 1     hydrocortisone (ANUSOL-HC) 2.5 % rectal cream Place rectally 2 (two) times daily. 28 g 1     lancets Misc To check BG twice daily, to use with insurance preferred meter 200 each 3     loratadine (CLARITIN) 10 mg tablet Take 10 mg by mouth daily as needed.        multivit with minerals/lutein (MULTIVITAMIN 50 PLUS ORAL) Take by mouth once daily.        omeprazole (PRILOSEC) 20 MG capsule TAKE 1 CAPSULE ONE TIME DAILY 90 capsule 3     potassium chloride SA (K-DUR,KLOR-CON M) 10 MEQ tablet TAKE 1 TABLET DAILY 90 tablet 0     rosuvastatin (CRESTOR) 5 MG tablet TAKE 1 TABLET ONE TIME DAILY (Patient not taking: Reported on 12/13/2023) 90 tablet 1     TRUE METRIX GLUCOSE METER Misc USE TO TEST BLOOD GLUCOSE ONCE DAILY       TRUEPLUS LANCETS 33 gauge Misc             Physical Exam:    Vital Signs: There were no vitals filed for this visit.    Gen: NAD, lying comfortably  HENT: NCAT, oropharynx clear  Eyes: anicteric sclerae, EOMI  grossly  Neck: supple, no visible masses/goiter  Cardiac: RRR  Lungs: non-labored breathing  Abd: soft, NT/ND, normoactive BS  Ext: no LE edema, warm, well perfused  Skin: skin intact on exposed body parts, no visible rashes, lesions  Neuro: A&Ox4, neuro exam grossly intact, moves all extremities  Psych: appropriate mood, affect        Labs:  Lab Results   Component Value Date    WBC 5.92 11/28/2023    HGB 13.7 11/28/2023    HCT 39.0 11/28/2023     11/28/2023    CHOL 178 02/09/2024    TRIG 101 02/09/2024    HDL 41 02/09/2024    ALT 22 02/09/2024    AST 28 02/09/2024     02/09/2024    K 3.7 02/09/2024     02/09/2024    CREATININE 1.1 02/09/2024    BUN 22 02/09/2024    CO2 24 02/09/2024    TSH 2.23 01/21/2022    INR 1.0 05/01/2007    HGBA1C 6.0 (H) 02/09/2024       Plan:  Colonoscopy for a history of colon polyps.    I have explained the risks and benefits of endoscopy procedures to the patient including but not limited to bleeding, perforation, infection, and death.  The patient was asked if they understand and allowed to ask any further questions to their satisfaction.    Kat Mcwilliams MD

## 2024-02-24 NOTE — [employer]ATION PATIENT INSTRUCTIONS
Discharge Summary/Instructions after an Endoscopic Procedure  Patient Name: Jasmin Zepeda  Patient MRN: [id number]  Patient YOB: 1945 Saturday, February 24, 2024  Kat Mcwilliams MD  Dear patient,  As a result of recent federal legislation (The Federal Cures Act), you may   receive lab or pathology results from your procedure in your [facility]   account before your physician is able to contact you. Your physician or   their representative will relay the results to you with their   recommendations at their soonest availability.  Thank you,  RESTRICTIONS:  During your procedure today, you received medications for sedation.  These   medications may affect your judgment, balance and coordination.  Therefore,   for 24 hours, you have the following restrictions:   - DO NOT drive a car, operate machinery, make legal/financial decisions,   sign important papers or drink alcohol.    ACTIVITY:  Today: no heavy lifting, straining or running due to procedural   sedation/anesthesia.  The following day: return to full activity including work.  DIET:  Eat and drink normally unless instructed otherwise.     TREATMENT FOR COMMON SIDE EFFECTS:  - Mild abdominal pain, nausea, belching, bloating or excessive gas:  rest,   eat lightly and use a heating pad.  - Sore Throat: treat with throat lozenges and/or gargle with warm salt   water.  - Because air was used during the procedure, expelling large amounts of air   from your rectum or belching is normal.  - If a bowel prep was taken, you may not have a bowel movement for 1-3 days.    This is normal.  SYMPTOMS TO WATCH FOR AND REPORT TO YOUR PHYSICIAN:  1. Abdominal pain or bloating, other than gas cramps.  2. Chest pain.  3. Back pain.  4. Signs of infection such as: chills or fever occurring within 24 hours   after the procedure.  5. Rectal bleeding, which would show as bright red, maroon, or black stools.   (A tablespoon of blood from the rectum is not serious,  especially if   hemorrhoids are present.)  6. Vomiting.  7. Weakness or dizziness.  GO DIRECTLY TO THE NEAREST EMERGENCY ROOM IF YOU HAVE ANY OF THE FOLLOWING:      Difficulty breathing              Chills and/or fever over 101 F   Persistent vomiting and/or vomiting blood   Severe abdominal pain   Severe chest pain   Black, tarry stools   Bleeding- more than one tablespoon   Any other symptom or condition that you feel may need urgent attention  Your doctor recommends these additional instructions:  If any biopsies were taken, your doctors clinic will contact you in 1 to 2   weeks with any results.  - Discharge patient to home.   - Resume previous diet.   - Continue present medications.   - Await pathology results.   - Repeat colonoscopy is not recommended for surveillance.  For questions, problems or results please call your physician - Kat Mcwilliams MD at Work:  [phone].  [facility], EMERGENCY ROOM PHONE NUMBER: [phone]  IF A COMPLICATION OR EMERGENCY SITUATION ARISES AND YOU ARE UNABLE TO REACH   YOUR PHYSICIAN - GO DIRECTLY TO THE EMERGENCY ROOM.  Kat Mcwilliams MD  2/24/2024 8:10:11 AM  This report has been verified and signed electronically.  Dear patient,  As a result of recent federal legislation (The Federal Cures Act), you may   receive lab or pathology results from your procedure in your [facility]   account before your physician is able to contact you. Your physician or   their representative will relay the results to you with their   recommendations at their soonest availability.  Thank you,  [employer]

## 2024-02-29 LAB
FINAL PATHOLOGIC DIAGNOSIS: NORMAL
GROSS: NORMAL
Lab: NORMAL

## 2024-03-05 ENCOUNTER — TELEPHONE (OUTPATIENT)
Dept: GASTROENTEROLOGY | Facility: CLINIC | Age: 79
End: 2024-03-05
Payer: MEDICARE

## 2024-03-05 NOTE — TELEPHONE ENCOUNTER
----- Message from Kat Mcwilliams MD sent at 3/5/2024 11:57 AM CST -----  Regarding: RE: Results Procedure 2/24/24  Contact: [phone]  Please let her know the polyp removed was not cancerous.  She does not need a repeat colonoscopy due to her age over 76.  ----- Message -----  From: Maida Long MA  Sent: 3/5/2024  10:26 AM CST  To: Kat Mcwilliams MD  Subject: FW: Results Procedure 2/24/24                    Please advise, thanks.   ----- Message -----  From: Leslye Mejia  Sent: 3/4/2024   4:56 PM CST  To: [facility] Staff  Subject: Results Procedure 2/24/24                        Jasmin Almonte calling regarding Results  (message) for #    pt is calling to go over results for her colonoscopy 2/24/24

## 2024-03-25 ENCOUNTER — PATIENT MESSAGE (OUTPATIENT)
Dept: PRIMARY CARE CLINIC | Facility: CLINIC | Age: 79
End: 2024-03-25
Payer: MEDICARE

## 2024-03-25 DIAGNOSIS — I10 HYPERTENSION, UNSPECIFIED TYPE: ICD-10-CM

## 2024-03-25 RX ORDER — AMLODIPINE BESYLATE 10 MG/1
10 TABLET ORAL DAILY
Qty: 14 TABLET | Refills: 0 | Status: SHIPPED | OUTPATIENT
Start: 2024-03-25 | End: 2024-04-09 | Stop reason: SDUPTHER

## 2024-03-25 RX ORDER — ATENOLOL 50 MG/1
50 TABLET ORAL DAILY
Qty: 14 TABLET | Refills: 0 | Status: SHIPPED | OUTPATIENT
Start: 2024-03-25 | End: 2024-04-09 | Stop reason: SDUPTHER

## 2024-03-25 RX ORDER — HYDROCHLOROTHIAZIDE 25 MG/1
25 TABLET ORAL DAILY
Qty: 14 TABLET | Refills: 0 | Status: SHIPPED | OUTPATIENT
Start: 2024-03-25 | End: 2024-04-09 | Stop reason: SDUPTHER

## 2024-03-25 NOTE — TELEPHONE ENCOUNTER
No care due was identified.  [facility] Embedded Care Due Messages. Reference number: [id number].   3/25/2024 3:06:51 PM CDT

## 2024-03-26 ENCOUNTER — OFFICE VISIT (OUTPATIENT)
Dept: OPTOMETRY | Facility: CLINIC | Age: 79
End: 2024-03-26
Payer: MEDICARE

## 2024-03-26 DIAGNOSIS — H52.03 HYPEROPIA OF BOTH EYES WITH ASTIGMATISM AND PRESBYOPIA: ICD-10-CM

## 2024-03-26 DIAGNOSIS — H25.812 COMBINED FORMS OF AGE-RELATED CATARACT OF LEFT EYE: Primary | ICD-10-CM

## 2024-03-26 DIAGNOSIS — Z96.1 PSEUDOPHAKIA OF RIGHT EYE: ICD-10-CM

## 2024-03-26 DIAGNOSIS — E11.9 DIABETES MELLITUS TYPE 2 WITHOUT RETINOPATHY: ICD-10-CM

## 2024-03-26 DIAGNOSIS — H52.4 HYPEROPIA OF BOTH EYES WITH ASTIGMATISM AND PRESBYOPIA: ICD-10-CM

## 2024-03-26 DIAGNOSIS — H52.203 HYPEROPIA OF BOTH EYES WITH ASTIGMATISM AND PRESBYOPIA: ICD-10-CM

## 2024-03-26 PROCEDURE — 99999 PR PBB SHADOW E&M-EST. PATIENT-LVL III: CPT | Mod: PBBFAC,HCNC,, | Performed by: OPTOMETRIST

## 2024-03-26 PROCEDURE — 1160F RVW MEDS BY RX/DR IN RCRD: CPT | Mod: HCNC,CPTII,S$GLB, | Performed by: OPTOMETRIST

## 2024-03-26 PROCEDURE — 1126F AMNT PAIN NOTED NONE PRSNT: CPT | Mod: HCNC,CPTII,S$GLB, | Performed by: OPTOMETRIST

## 2024-03-26 PROCEDURE — 92004 COMPRE OPH EXAM NEW PT 1/>: CPT | Mod: HCNC,S$GLB,, | Performed by: OPTOMETRIST

## 2024-03-26 PROCEDURE — 1159F MED LIST DOCD IN RCRD: CPT | Mod: HCNC,CPTII,S$GLB, | Performed by: OPTOMETRIST

## 2024-03-26 PROCEDURE — 1101F PT FALLS ASSESS-DOCD LE1/YR: CPT | Mod: HCNC,CPTII,S$GLB, | Performed by: OPTOMETRIST

## 2024-03-26 PROCEDURE — 92015 DETERMINE REFRACTIVE STATE: CPT | Mod: HCNC,S$GLB,, | Performed by: OPTOMETRIST

## 2024-03-26 PROCEDURE — 3288F FALL RISK ASSESSMENT DOCD: CPT | Mod: HCNC,CPTII,S$GLB, | Performed by: OPTOMETRIST

## 2024-03-26 PROCEDURE — 2023F DILAT RTA XM W/O RTNOPTHY: CPT | Mod: HCNC,CPTII,S$GLB, | Performed by: OPTOMETRIST

## 2024-03-26 NOTE — PROGRESS NOTES
HPI    ASHWIN: 2020  Chief complaint (CC): 79 yo F here for annual eye exam. Patient denies any   new ocular or visual complaints today.    Glasses? Yes   Contacts? No  H/o eye surgery, injections or laser:    Cataract removal OD   H/o eye injury: No  Known eye conditions? No  Family h/o eye conditions? No  Eye gtts? No      (-) Flashes (-)  Floaters (-) Mucous   (+)  Tearing (-) Itching (-) Burning   (+) Headaches (-) Eye Pain/discomfort (-) Irritation   (-)  Redness (-) Double vision (-) Blurry vision    Diabetic? Pre Diabetic   A1c? Lab Results       Component                Value               Date                       HGBA1C                   6.0 (H)             02/09/2024                 Last edited by Bakari Weller, OD on 3/26/2024  4:46 PM.            Assessment /Plan     For exam results, see Encounter Report.        Diabetes mellitus type 2 without retinopathy   - Pt educated on the importance of maintaining adequate glycemic and blood pressure control via diet, compliance with medications, exercise and timely follow up with PCP.  No diabetic retinopathy, No CSME.   - Return in 1 year for dilated eye exam.    Combined forms of age-related cataract of left eye   - Visually significant. Pt prefers to hold off on CE at this time.    Pseudophakia of right eye   - Doing well. Monitor.     Hyperopia of both eyes with astigmatism and presbyopia   - New Spectacle Rx given, discussed different options for glasses.   - RTC 1 year for routine eye exam.

## 2024-04-30 NOTE — TELEPHONE ENCOUNTER
LOV 12/01/23. Rx sent to [facility] this morning but pt will be out before it gets delivered, needs temporary Rx fill at local pharmacy.   Vaginal Delivery Summary - OB/GYN   Michelle Marion 37 y.o. female MRN: [id number]  Unit/Bed#: -01 Encounter: [id number]    Predelivery Diagnosis:  1. Pregnancy at 40w4d  2. Single fetus   3. Gestational HTN    Postdelivery Diagnosis:  1. Same as above - Delivered  2. 1st degree laceration  3. Labial tear    Procedure: Spontaneous Vaginal Delivery, repair of first degree and right labial laceration    Attending: Dr. Kathrine Perez    Anesthesia: Epidural    QBL: 168cc  Admission Hg:   Recent Labs     24  2301   HGB 11.3*     Admission platelets:   Recent Labs     24  2301            Complications: none apparent    Specimens: cord blood, arterial and venous cord blood gasses, placenta to pathology    Findings:   1. Viable male at 13:03, with APGARS of 8 (1 min) and 9 (5 min),  2. Spontaneous delivery of intact placenta at 13:08  3. first degree laceration and right labial laceration both repaired with 3.0 vicryl figure of 8 stitches  4. Blood gases:    Umbilical Cord Venous Blood Gas:  Results from last 7 days   Lab Units 24  1305   PH COV  7.372   PCO2 COV mm HG 38.7   HCO3 COV mmol/L 22.0   BASE EXC COV mmol/L -2.9*   O2 CT CD VB mL/dL 18.1   O2 HGB, VENOUS CORD % 79.3     Umbilical Cord Arterial Blood Gas:  Results from last 7 days   Lab Units 24  1305   PH COA  7.186*   PCO2 COA  61.5*   PO2 COA mm HG 14.2   HCO3 COA mmol/L 22.8   BASE EXC COA mmol/L -6.6*   O2 CONTENT CORD ART ml/dl 4.2   O2 HGB, ARTERIAL CORD % 19.0       Disposition:  Patient tolerated the procedure well and was recovering in labor and delivery room     Brief History and Labor Course:    Michelle Marion is a 37 y.o.  with an DARIN of 2024, by Last Menstrual Period at 40w4d gestation who was admitted for Induction of labor.    Please see labor timeline for complete labor course.     The patient was completely dilated at 12:40. She began to push at 12:57.     Description of procedure    After  pushing for 6 minutes, at 13:03 patient delivered a viable male , wt pending, apgars of 8 (1 min) and 9 (5 min). The fetal vertex delivered spontaneously. The baby was palpated for a nuchal cord and none was palpated. it was noted times 1.  It was easily reduced at the perineum.  The anterior shoulder delivered atraumatically with maternal expulsive forces and the assistance of downward traction. The posterior shoulder delivered with maternal expulsive forces and the assistance of upward traction. The remainder of the fetus delivered spontaneously.     Upon delivery, the infant was placed on the maternal abdomen and delayed cord clamping was performed.  The umbilical cord was then doubly clamped and cut.  The infant was noted to cry spontaneously and was moving all extremities appropriately. Arterial and venous cord blood gases and cord blood was collected for analysis. These were promptly sent to the lab. In the immediate post-partum, 30 units of IV pitocin was administered, and the uterus was noted to contract down well with massage and pitocin. The placenta delivered spontaneously at 13:08 and was noted to have a centrally inserted 3 vessel cord.     The vagina, cervix, perineum, and rectum were inspected and there was noted to be a very small 1st degree and right labial laceration.  Both were repaired with 3.0 vicryl on a CT needle using separate figure of 8 stitches.  Good hemostasis was noted.     At the conclusion of the procedure, all needle, sponge, and instrument counts were noted to be correct. Patient tolerated the procedure well and was allowed to recover in labor and delivery room with family and  before being transferred to the post-partum floor.     Kathrine Perez MD

## 2024-05-01 ENCOUNTER — NURSE TRIAGE (OUTPATIENT)
Dept: ADMINISTRATIVE | Facility: CLINIC | Age: 79
End: 2024-05-01
Payer: MEDICARE

## 2024-05-01 NOTE — TELEPHONE ENCOUNTER
[place]    PCP:  Dr. Gloria Smith    C/O constant lower abdominal pain rated 5/10, nausea this morning (took Dulcolax), lower back pain, dark urine, decreased fluid intake, fatigue, stiff neck (able to touch her chin to her chest), sinus congestion, and HA rated 9/10 (above the eyes on the side).  She has tried Tylenol which gives a little relief but pain is still there.  Denies V/D, blood on stool, known fever, pain with urination, and weakness.  She did have a BM today.  Per protocol, care advised is go to [facility]/ED now (or to office with PCP approval).  Message sent to RUSS for approval to PCP office or if recommended to [facility] or ED.  Advised pt that NT will notify her if okay to be seen in the office after speaking with RUSS but if no callback within 30 mins then go to [facility]/ED.  RUSS recommends to go to [facility]/ED.  NT notified pt of RUSS recommendations.  Pt VU.  Advised to call for worsening/questions/concerns.  VU.    Reason for Disposition   MILD TO MODERATE constant pain lasting > 2 hours   SEVERE headache, states 'worst headache' of life    Additional Information   Negative: Passed out (i.e., fainted, collapsed and was not responding)   Negative: Shock suspected (e.g., cold/pale/clammy skin, too weak to stand, low BP, rapid pulse)   Negative: Sounds like a life-threatening emergency to the triager   Negative: SEVERE abdominal pain (e.g., excruciating)   Negative: Vomiting red blood or black (coffee ground) material   Negative: Blood in bowel movements  (Exception: Blood on surface of BM with constipation.)   Negative: Black or tarry bowel movements  (Exception: Chronic-unchanged black-grey BMs AND is taking iron pills or Pepto-Bismol.)   Negative: Difficult to awaken or acting confused (e.g., disoriented, slurred speech)   Negative: Weakness of the face, arm or leg on one side of the body and new-onset   Negative: Numbness of the face, arm or leg on one side of the body and new-onset   Negative: Loss of speech or garbled  speech and new-onset   Negative: Passed out (i.e., fainted, collapsed and was not responding)   Negative: Sounds like a life-threatening emergency to the triager   Negative: Unable to walk without falling   Negative: Stiff neck (can't touch chin to chest)   Negative: Possibility of carbon monoxide exposure    Protocols used: Abdominal Pain - Female-A-OH, Headache-A-OH

## 2024-05-01 NOTE — TELEPHONE ENCOUNTER
Could be diverticulitis and if getting dehydrated needs to go to ED for urgent eval and IVF  Dr. GALVAN

## 2024-05-02 ENCOUNTER — OFFICE VISIT (OUTPATIENT)
Dept: URGENT CARE | Facility: CLINIC | Age: 79
End: 2024-05-02
Payer: MEDICARE

## 2024-05-02 ENCOUNTER — TELEPHONE (OUTPATIENT)
Dept: PRIMARY CARE CLINIC | Facility: CLINIC | Age: 79
End: 2024-05-02
Payer: MEDICARE

## 2024-05-02 VITALS
TEMPERATURE: 99 F | WEIGHT: 176.38 LBS | DIASTOLIC BLOOD PRESSURE: 65 MMHG | BODY MASS INDEX: 32.46 KG/M2 | SYSTOLIC BLOOD PRESSURE: 129 MMHG | HEART RATE: 63 BPM | RESPIRATION RATE: 16 BRPM | HEIGHT: 62 IN | OXYGEN SATURATION: 98 %

## 2024-05-02 DIAGNOSIS — K59.00 CONSTIPATION, UNSPECIFIED CONSTIPATION TYPE: ICD-10-CM

## 2024-05-02 DIAGNOSIS — R51.9 SINUS HEADACHE: ICD-10-CM

## 2024-05-02 DIAGNOSIS — R10.9 ABDOMINAL PAIN, UNSPECIFIED ABDOMINAL LOCATION: Primary | ICD-10-CM

## 2024-05-02 LAB
BILIRUB UR QL STRIP: NEGATIVE
GLUCOSE UR QL STRIP: NEGATIVE
KETONES UR QL STRIP: NEGATIVE
LEUKOCYTE ESTERASE UR QL STRIP: NEGATIVE
PH, POC UA: 7
POC BLOOD, URINE: NEGATIVE
POC NITRATES, URINE: NEGATIVE
PROT UR QL STRIP: NEGATIVE
SP GR UR STRIP: 1.01 (ref 1–1.03)
UROBILINOGEN UR STRIP-ACNC: NORMAL (ref 0.1–1.1)

## 2024-05-02 PROCEDURE — 99214 OFFICE O/P EST MOD 30 MIN: CPT | Mod: S$GLB,,, | Performed by: NURSE PRACTITIONER

## 2024-05-02 PROCEDURE — 74019 RADEX ABDOMEN 2 VIEWS: CPT | Mod: S$GLB,,, | Performed by: RADIOLOGY

## 2024-05-02 PROCEDURE — 81003 URINALYSIS AUTO W/O SCOPE: CPT | Mod: QW,S$GLB,, | Performed by: NURSE PRACTITIONER

## 2024-05-02 RX ORDER — ATROPINE SULFATE 0.1 MG/ML
INJECTION INTRAVENOUS
COMMUNITY
Start: 2023-11-28 | End: 2024-05-16

## 2024-05-02 RX ORDER — DOBUTAMINE HYDROCHLORIDE 400 MG/100ML
INJECTION INTRAVENOUS
COMMUNITY
Start: 2023-11-28 | End: 2024-05-16

## 2024-05-02 RX ORDER — AZELASTINE 1 MG/ML
1 SPRAY, METERED NASAL 2 TIMES DAILY
Qty: 30 ML | Refills: 3 | Status: SHIPPED | OUTPATIENT
Start: 2024-05-02 | End: 2025-05-02

## 2024-05-02 RX ORDER — HUMAN ALBUMIN MICROSPHERES AND PERFLUTREN 10; .22 MG/ML; MG/ML
INJECTION, SOLUTION INTRAVENOUS
COMMUNITY
Start: 2023-11-28 | End: 2024-05-16

## 2024-05-02 RX ORDER — DOCUSATE SODIUM 100 MG/1
100 CAPSULE, LIQUID FILLED ORAL 2 TIMES DAILY
Qty: 60 CAPSULE | Refills: 0 | Status: SHIPPED | OUTPATIENT
Start: 2024-05-02 | End: 2024-06-01

## 2024-05-02 RX ORDER — IOHEXOL 350 MG/ML
INJECTION, SOLUTION INTRAVENOUS
COMMUNITY
Start: 2023-11-27 | End: 2024-05-16

## 2024-05-02 RX ORDER — ZOSTER VACCINE RECOMBINANT, ADJUVANTED 50 MCG/0.5
KIT INTRAMUSCULAR
COMMUNITY
Start: 2024-01-23 | End: 2024-05-16

## 2024-05-02 RX ORDER — GLYCOPYRROLATE 0.2 MG/ML
INJECTION, SOLUTION INTRAMUSCULAR; INTRAVITREAL
COMMUNITY
Start: 2024-02-24 | End: 2024-05-16

## 2024-05-02 RX ORDER — PROPOFOL 10 MG/ML
INJECTION, EMULSION INTRAVENOUS
COMMUNITY
Start: 2024-02-24 | End: 2024-05-16

## 2024-05-02 RX ORDER — LEVOCETIRIZINE DIHYDROCHLORIDE 5 MG/1
5 TABLET, FILM COATED ORAL NIGHTLY
Qty: 30 TABLET | Refills: 11 | Status: SHIPPED | OUTPATIENT
Start: 2024-05-02 | End: 2025-05-02

## 2024-05-02 NOTE — PROGRESS NOTES
"Subjective:      Patient ID: Jasmin Almonte is a 78 y.o. female.    Vitals:  height is 5' 2" (1.575 m) and weight is 80 kg (176 lb 5.9 oz). Her oral temperature is 98.7 °F (37.1 °C). Her blood pressure is 129/65 and her pulse is 63. Her respiration is 16 and oxygen saturation is 98%.     Chief Complaint: Abdominal Pain    77 y/o female presents today with a complaint of  RLQ & LLQ abdominal pain that radiates to lumbar of back ; onset approx 3x days ago. History of constipation.  States she tried a stool softener, mild relief. Reports additional complaint of headaches, and sinus pressure.     Abdominal Pain  This is a new problem. The current episode started in the past 7 days. The onset quality is sudden. The problem occurs constantly. The problem has been unchanged. The pain is located in the LLQ and RLQ. The pain is at a severity of 5/10. The pain is moderate. The quality of the pain is dull. The abdominal pain radiates to the back. Associated symptoms include constipation and headaches. Pertinent negatives include no anorexia, arthralgias, belching, diarrhea, dysuria, fever, flatus, frequency, hematochezia, hematuria, melena, myalgias, nausea, vomiting or weight loss. Nothing aggravates the pain. The pain is relieved by Nothing. She has tried acetaminophen (stool softner) for the symptoms. The treatment provided no relief. Her past medical history is significant for GERD. There is no history of abdominal surgery, colon cancer, Crohn's disease, gallstones, irritable bowel syndrome, pancreatitis, PUD or ulcerative colitis. Patient's medical history does not include kidney stones and UTI.       Constitution: Negative for chills, fatigue, fever and generalized weakness.   Cardiovascular:  Negative for chest pain and palpitations.   Gastrointestinal:  Positive for abdominal pain and constipation. Negative for abdominal trauma, abdominal bloating, history of abdominal surgery, nausea, vomiting, diarrhea, " bright red blood in stool, dark colored stools, rectal bleeding, rectal pain, hemorrhoids, heartburn and bowel incontinence.   Genitourinary:  Negative for dysuria, frequency and hematuria.   Musculoskeletal:  Negative for joint pain and muscle ache.   Skin:  Negative for rash.   Neurological:  Positive for headaches.      Objective:     Physical Exam   Constitutional: She is oriented to person, place, and time. She appears well-developed.  Non-toxic appearance. She does not appear ill. No distress.   HENT:   Head: Normocephalic and atraumatic.   Ears:   Right Ear: External ear normal.   Left Ear: External ear normal.   Nose: Nose normal.   Mouth/Throat: Mucous membranes are normal.   Eyes: Conjunctivae and lids are normal.   Neck: Trachea normal. Neck supple.   Cardiovascular: Normal rate, regular rhythm and normal heart sounds.   Pulmonary/Chest: Effort normal and breath sounds normal. No respiratory distress.   Abdominal: Normal appearance and bowel sounds are normal. She exhibits no distension and no mass. Soft. There is abdominal tenderness in the right lower quadrant and left lower quadrant. There is no rebound, no guarding, no left CVA tenderness and no right CVA tenderness. No hernia.      Comments: Mild tenderness to lower abdominal    Musculoskeletal: Normal range of motion.         General: Normal range of motion.   Neurological: She is alert and oriented to person, place, and time. She has normal strength.   Skin: Skin is warm, dry, intact, not diaphoretic and not pale.   Psychiatric: Her speech is normal and behavior is normal. Judgment and thought content normal.   Nursing note and vitals reviewed.      Assessment:     1. Abdominal pain, unspecified abdominal location    2. Sinus headache    3. Constipation, unspecified constipation type    EXAMINATION:  XR ABDOMEN FLAT AND ERECT     CLINICAL HISTORY:  Unspecified abdominal pain     TECHNIQUE:  Flat and erect AP views of the abdomen were performed.      COMPARISON:  [facility] chest and CTAA scan chest 11/27/2023     FINDINGS:  Linea scarring left infrahilar medial base segment left lower lobe stable.  No free air.  Nondistended air GI track with mild moderate amount of fecal debris large bowel lumen.  Mild distention urinary bladder.     Impression:     Constipation.        Electronically signed by:Josh Peguero MD  Date:                                            05/02/2024  Time:                                           11:    Results for orders placed or performed in visit on 05/02/24   POCT Urinalysis, Dipstick, Automated, W/O Scope   Result Value Ref Range    POC Blood, Urine Negative Negative    POC Bilirubin, Urine Negative Negative    POC Urobilinogen, Urine normal 0.1 - 1.1    POC Ketones, Urine Negative Negative    POC Protein, Urine Negative Negative    POC Nitrates, Urine Negative Negative    POC Glucose, Urine Negative Negative    pH, UA 7.0     POC Specific Gravity, Urine 1.010 1.003 - 1.029    POC Leukocytes, Urine Negative Negative      Plan:   Abdominal pain, unspecified abdominal location  -     POCT Urinalysis, Dipstick, Automated, W/O Scope  -     X-Ray Abdomen Flat And Erect; Future; Expected date: 05/02/2024  -     Ambulatory referral/consult to Gastroenterology    Sinus headache  -     azelastine (ASTELIN) 137 mcg (0.1 %) nasal spray; 1 spray (137 mcg total) by Nasal route 2 (two) times daily. As needed  Dispense: 30 mL; Refill: 3  -     levocetirizine (XYZAL) 5 MG tablet; Take 1 tablet (5 mg total) by mouth every evening.  Dispense: 30 tablet; Refill: 11    Constipation, unspecified constipation type  -     Ambulatory referral/consult to Gastroenterology  -     docusate sodium (COLACE) 100 MG capsule; Take 1 capsule (100 mg total) by mouth 2 (two) times daily.  Dispense: 60 capsule; Refill: 0    PLENTY OF FLUIDS    Try an over the counter laxative like miralax and glycerin suppositories.     High fiber  meals    Try an over the counter fleets enema    Please go to the ER if you experience worsening of symptoms, inability to pass gas or stool, blood in your vomit or stool or dark black color, passing out, severely worsening pain.     Please see your primary care doctor if symptoms do not improve      4:58 PM Discussed x-ray result, Gastroenterology referral secondary to history of constipation.  Patient is amenable.          Sinus congestion/headache instructions:    You can try breathe right strips at night to help you breathe.  A cool mist humidifier in bedroom may help with cough and relieve stuffy nose.     Sore throat:  Lozenge, hard candy or honey.      Sinus rinses DO NOT USE TAP WATER, if you must, water must be a rolling boil for 1 minute, let it cool, then use.  May use distilled water, or over the counter nasal saline rinses.  Vics vapor rub in shower to help open nasal passages.  May use nasal gel to keep passages moisturized.  May use Nasal saline sprays during the day for added relief of congestion.   For those who go to the gym, please do not use the sauna or steam room now to clear sinuses.    During pollen season, change shirt if you are outside for a while when you go in.  Also wash your face.  Do not touch your face with your hands.  Wash your hands often in general while ill, avoid face contact with hands.     Over the counter you can use Tylenol (acetominophen) or Ibuprofen for your minor aches and pains as long as you have no contraindications.    Good nutrition. Lots of rest. Plenty of fluids

## 2024-05-02 NOTE — TELEPHONE ENCOUNTER
I sw pt. She went to UC yesterday for abd pain. Had an abdominal xray. States she is waiting for a call from the provider with the results. States her pain is better today. Will await call from UC provider and if pain continues or if she continues to have issues with constipation will reach out to her GI for an evaluation.

## 2024-05-02 NOTE — TELEPHONE ENCOUNTER
----- Message from Audrey Collado sent at 5/2/2024  3:14 PM CDT -----  Contact: [phone]  [email or web address]     Patient is calling for Medical Advice regarding: sinus and stomach pains     How long has patient had these symptoms: 4 days     Pharmacy name and phone#:        Would like response via [facility]:  call back     Comments:

## 2024-05-02 NOTE — PATIENT INSTRUCTIONS
You will be contacted regarding abdominal x-ray results with further instructions.            Sinus congestion/headache instructions:    You can try breathe right strips at night to help you breathe.  A cool mist humidifier in bedroom may help with cough and relieve stuffy nose.     Sore throat:  Lozenge, hard candy or honey.      Sinus rinses DO NOT USE TAP WATER, if you must, water must be a rolling boil for 1 minute, let it cool, then use.  May use distilled water, or over the counter nasal saline rinses.  Vics vapor rub in shower to help open nasal passages.  May use nasal gel to keep passages moisturized.  May use Nasal saline sprays during the day for added relief of congestion.   For those who go to the gym, please do not use the sauna or steam room now to clear sinuses.    During pollen season, change shirt if you are outside for a while when you go in.  Also wash your face.  Do not touch your face with your hands.  Wash your hands often in general while ill, avoid face contact with hands.     Over the counter you can use Tylenol (acetominophen) or Ibuprofen for your minor aches and pains as long as you have no contraindications.    Good nutrition. Lots of rest. Plenty of fluids   You must understand that you've received an Urgent Care treatment only and that you may be released before all your medical problems are known or treated. You, the patient, will arrange for follow up care as instructed.  Follow up with your PCP or specialty clinic as directed in the next 1-2 weeks if not improved or as needed.  You can call [phone] to schedule an appointment with the appropriate provider.  If your condition worsens we recommend that you receive another evaluation at the emergency room immediately or contact your primary medical clinics after hours call service to discuss your concerns.  Please return here or go to the Emergency Department for any concerns or worsening of condition.    If you were prescribed  a narcotic or controlled medication, do not drive or operate heavy equipment or machinery while taking these medications.    Thank you for choosing [facility]!    Our goal in the Urgent Care is to always provide outstanding medical care. You may receive a survey by mail or e-mail in the next week regarding your experience today. We would greatly appreciate you completing and returning the survey. Your feedback provides us with a way to recognize our staff who provide very good care, and it helps us learn how to improve when your experience was below our aspiration of excellence.      We appreciate you trusting us with your medical care. We hope you feel better soon. We will be happy to take care of you for all of your future medical needs.   This note was prepared using voice-recognition software.  Although efforts are made to proofread the note, some errors may persist in the final document.     Sincerely,    Sony Manzo DNP, FNP-C

## 2024-05-13 NOTE — PROGRESS NOTES
"[facility] Note    Chief Complaint      Chief Complaint   Patient presents with    Follow-up       History of Present Illness      Jasmin Almonte is a 78 y.o.   AAF with HTN, HLD, AR, CKD III,  and Anxiety presents to  to fu chronic issues.  Last visit - 12/1/23    UC - 5/2/24 - Abd pain - +constipation. Colace once daily helps a little. Last BM yesterday - soft and small. She has had hard stools up until 2 days ago. She has a Bm qday with the colace. Rec adeq hydration.  She drinks 6 8oz rodriguez/d and 1-2 coffee. Rec inc fiber in diet.  Rec resume benefiber.  Rec stool softener BID  and Miralax prn. Could as 2 tsp of ground flax seed/d.       CP - Hosp - 11/27/23 - CP/TURPIN - Initial cardiac workup including CTA chest - 11/27/23 - no evid of PE.  Tiny pulmonary nodules at the right lung base measuring up to 0.2 cm. Low risk - nonsmoker - no fu nec.  Chest pain most likely musculoskeletal due to patient having a cough for the past week, however calculated heart score at 4. Stress Echo - 11/27/23 - EF - wnl. Grade I quan dysf, mild aortic sclerosis. neg for ischemia.  Rare PAC's. The CP is improving. "It has eased up a lot".  Her SOB has improved as well.  Fu Cards, Dr. Krishnan, on 12/13/23. Will fu again in 1 yr (Dec.2024).      Hypokalemia -   Rec a high potassium diet (bananas, oranges, spinach, potato, mushrooms, peas, sweet potatoes). This may be due to Hydrochlorothiazide so we started Kcl 10 MEq qday. We discussed options including changing HCTZ and KCL to aldactone 25 mg/d. She would like to change to aldactone and stop HCTZ and Kcl.      Fatty Liver - Abd U/S - 11/3/22- fatty Liver. Rec limit tylenol and alcohol. Rec diet and exercise for wt loss.  As discussed at visit there is a potential for cirrhosis.   FIB-4 Calculation: 2.54 at 5/16/2024 10:31 AM   FIB-4 below 1.30 is considered as low-risk for advanced fibrosis  FIB-4 over 2.67 is considered as high-risk for advanced " "fibrosis  FIB-4 values between 1.30 and 2.67 are considered as intermediate-risk of advanced fibrosis for ages 36-64.   For ages > 64 the cut-off for low-risk goes to < 2.     Mod/Severe NIK - Snoring - Fu by sleep clinic. Sleep study - 6/3/22 - Moderately severe Obstructive Sleep Apnea. She is on an APAP - 7-12 cmH2O. She uses it nightly and rarely does not use it (4 times/month).   The mask bothers her at times so she will d/w DME.      Cervical Radiculopathy - MRI C-spine - 10/6/21- Spondylosis of the cervical spine, there is no severe canal stenosis. Fu by Ortho. C-spine Xrays - 2/25/22 - Mild degenerative changes as above and minimal instability. Pt not on gabapentin 100 mg TID prn. Pt completed Ptx.       H/o Cisco Heel pain -  Xray 4/29/21 - Achilles enthesopathy and plantar calcaneal spur noted bilaterally. Gel insert helps. Calf pain improved with stretching.  Resolved.      Chronic Knee pain -She has had this pain "for a couple yrs".  Tylenol - it helps a little but not much. It can be 2-8/10 in severity if she is on her legs too long.   No trauma. No swelling.  No erythema, or warmth.  No h/o Gout.   Cisco Knee Xray - 4/29/21 -  Moderate loss of tibiofemoral cartilage space with osteophyte production.  She has trouble going up stairs.  Her Rt knee gives out/mahad.   now has an apartment on the 1st floor in a senior Independent Living facility. She uses Tylenol daily.  She will alert me for any GI S.E.      DM  II-with CKD III Ha1c was controlled at 6.0 -2/9/24. This is now consistent with Diabetes.  She had diabetic teaching. Rec a low carb diet.  I do not feel we need to start medication for this yet but will continue to monitor this.         HTN - Controlled on Atenolol 50 mg/d, Amlodipine 10 mg/d, HCTZ 25 mg QAm. Note she had ? angioedema/anaphylaxis to Losartan in past.  She would like to change to aldactone and stop HCTZ and Kcl.      HLD - Pt uncontrolled. She fell off the Crestor 5 mg Qother day " but has since resumed.  CK level was slightly high but stable at 247.  Her leg cramps have gotten better since starting magnesium - bit has not been taking. +atherosclerosis seen on prev Xray.   She has prev. not tolerated higher doses of Crestor. Continue  current regimen.      AR -Pt on Claritin prn and Astelin prn.      Anxiety - Pt doing well off Lexapro 10 mg/d due to wt gain. Stable off meds.       GERD - Pt on Famotidine prn - no longer helping.  Rec Reflux prec.  Pt doing better on Prilosec daily prn.      MNG - Pt prev fu by ENT, Dr. Patel.  She then fu for a second opinion at [facility] with Dr. Robyn Mann. She is now fu by Dr. Romero. Thyroid u/s - 8/8/23 - a Stable multinodular thyroid gland.  No nodules meet criteria for follow-up or FNA.     Osteopenia - h/o Rt humeral neck fracture.   She tries to walk. Cont Calcium and Vit d.  Repeat DEXA in 11/2025.      Fecal incontinence - Resolved.  No longer taking benefiber every day and her Rectal pressure and fecal incontinence has resolved.       H/O PE - '74- She was tx at the time.  Pt no longer on Anticoagulation.      Acc by      HCM - Flu - none -defers; RSV - none - defers;  Tdap - ?< 10 yrs;  PCV 13 - 6/2/20;  PVX 23 - 7/14/21;  Shingrix - # 1 8/15/23; # 2 1/23/24;  COVID 19 Vaccine #1 - 3/3/21; #2 - 3/26/21; # 3 - 1/10/22; # 4 9/21/22; # 5 ;  MGM - 6/28/23 - repeat 1 yr;  DEXA - 11/20/23 - + Osteopenia;  PAP - no longer gets; Hep C Screen - neg - 6/2/20; Cscope - 2/24/24 -polyp - no need to repeat; GI - Dr. Sanchez- planning to sched.;  Prev PCP - me then Dr. Kennedy; Ortho - Dr. Rodrigues; Ophtho - ; Endo - Dr. Romero; Podiatry - Dr. Oconnor       Patient Care Team:  Gloria Smith MD as PCP - General (Internal Medicine)  Izzy Alfaro MA as Care Coordinator  Janelle Tinoco RD, CDE as Diabetes Educator (Diabetes)     Health Maintenance:  Immunization History   Administered Date(s) Administered     COVID-19 MRNA, LN-S PF (MODERNA HALF 0.25 ML DOSE) 01/10/2022    COVID-19, MRNA, LN-S, PF (Pfizer) (Purple Cap) 03/03/2021, 03/26/2021    COVID-19, mRNA, LNP-S, bivalent booster, PF (PFIZER OMICRON) 09/21/2022    Pneumococcal Conjugate - 13 Valent 06/02/2020    Pneumococcal Polysaccharide - 23 Valent 07/14/2021    Zoster Recombinant 08/15/2023, 01/23/2024      Health Maintenance   Topic Date Due    TETANUS VACCINE  Never done    Hemoglobin A1c  08/09/2024    Lipid Panel  02/09/2025    Eye Exam  03/26/2025    DEXA Scan  11/15/2026    Hepatitis C Screening  Completed    Shingles Vaccine  Completed        Past Medical History:  Past Medical History:   Diagnosis Date    Allergic rhinitis     Anxiety     Aortic atherosclerosis     CKD (chronic kidney disease)     Colon polyps     COVID-19 11/25/2020 11/20/23    DDD (degenerative disc disease), cervical     DVT (deep venous thrombosis)     Hyperlipidemia     Hypertension     Multiple thyroid nodules     Prediabetes     Pulmonary emboli        Past Surgical History:   has a past surgical history that includes Hysterectomy; Appendectomy; Incisional hernia repair; Laparotomy; Biopsy of thyroid; Colonoscopy (N/A, 02/24/2024); Eye surgery (12/2020); and Hernia repair (2007).    Family History:  family history includes Cancer in her mother; Colon cancer in her brother; Diabetes in her sister; Diabetes type I in her sister; Heart disease in her father; Heart failure in her brother and mother; Hypertension in her brother, father, mother, and sister; Hypothyroidism in her sister; Kidney disease in her brother; Kidney failure in her brother and sister.     Social History:  Social History     Tobacco Use    Smoking status: Never    Smokeless tobacco: Never   Substance Use Topics    Alcohol use: Never    Drug use: Never       Review of Systems   Constitutional:  Negative for chills, diaphoresis and fever.   HENT:  Negative for hearing loss.    Eyes:  Negative for visual  disturbance.        Wears glasses + Cataract   Respiratory:  Negative for chest tightness and shortness of breath.    Cardiovascular:  Positive for chest pain. Negative for palpitations.        RT sided - she thinks she pulled a muscle.  She awakened with it being sore a few days ago. Heating pad prn and tylenol prn helps. It's better. Moving a certain way exacerbates it.    Gastrointestinal:  Positive for abdominal pain and constipation. Negative for diarrhea, nausea and vomiting.   Endocrine: Positive for cold intolerance. Negative for heat intolerance and polydipsia.   Genitourinary:  Negative for bladder incontinence, dysuria and frequency.   Musculoskeletal:  Negative for arthralgias and myalgias.   Neurological:  Positive for dizziness and headaches. Negative for memory loss.   Psychiatric/Behavioral:  Negative for dysphoric mood. The patient is not nervous/anxious.         Medications:    Current Outpatient Medications:     amLODIPine (NORVASC) 10 MG tablet, Take 1 tablet (10 mg total) by mouth once daily., Disp: 90 tablet, Rfl: 0    aspirin 81 MG Chew, Take 81 mg by mouth once daily. , Disp: , Rfl:     atenoloL (TENORMIN) 50 MG tablet, Take 1 tablet (50 mg total) by mouth once daily., Disp: 90 tablet, Rfl: 0    azelastine (ASTELIN) 137 mcg (0.1 %) nasal spray, 1 spray (137 mcg total) by Nasal route 2 (two) times daily. As needed, Disp: 30 mL, Rfl: 3    blood sugar diagnostic Strp, To check BG twice daily, to use with TrueMetrix meter, Disp: 200 strip, Rfl: 3    blood-glucose meter kit, To check BG once daily, to use with insurance preferred meter, Disp: 1 each, Rfl: 0    diclofenac sodium (VOLTAREN) 1 % Gel, Apply 2 g topically 4 (four) times daily. As needed, Disp: 100 g, Rfl: 1    docusate sodium (COLACE) 100 MG capsule, Take 1 capsule (100 mg total) by mouth 2 (two) times daily., Disp: 60 capsule, Rfl: 0    EPINEPHrine (EPIPEN 2-YAA) 0.3 mg/0.3 mL AtIn, Inject 0.3 mLs (0.3 mg total) into the muscle as  "needed., Disp: 1 each, Rfl: 0    hydrocortisone (ANUSOL-HC) 2.5 % rectal cream, Place rectally 2 (two) times daily., Disp: 28 g, Rfl: 1    lancets Misc, To check BG twice daily, to use with insurance preferred meter, Disp: 200 each, Rfl: 3    levocetirizine (XYZAL) 5 MG tablet, Take 1 tablet (5 mg total) by mouth every evening., Disp: 30 tablet, Rfl: 11    multivit with minerals/lutein (MULTIVITAMIN 50 PLUS ORAL), Take by mouth once daily. , Disp: , Rfl:     omeprazole (PRILOSEC) 20 MG capsule, TAKE 1 CAPSULE ONE TIME DAILY, Disp: 90 capsule, Rfl: 3    gabapentin (NEURONTIN) 100 MG capsule, TAKE 1 CAPSULE THREE TIMES DAILY (Patient taking differently: As needed), Disp: 90 capsule, Rfl: 2    rosuvastatin (CRESTOR) 5 MG tablet, 1 po Q other evening, Disp: 45 tablet, Rfl: 1    spironolactone (ALDACTONE) 25 MG tablet, Take 1 tablet (25 mg total) by mouth once daily., Disp: 90 tablet, Rfl: 1    TRUE METRIX GLUCOSE METER Misc, USE TO TEST BLOOD GLUCOSE ONCE DAILY (Patient not taking: Reported on 5/16/2024), Disp: , Rfl:     TRUEPLUS LANCETS 33 gauge Misc, , Disp: , Rfl:      Allergies:  Review of patient's allergies indicates:   Allergen Reactions    Meperidine Hives and Other (See Comments)     Unknown reaction         Physical Exam      Vital Signs  Temp: 98.1 °F (36.7 °C)  Temp Source: Oral  Pulse: 68  SpO2: 97 %  BP: 136/60  BP Location: Left arm  Patient Position: Sitting  Pain Score: 0-No pain  Height and Weight  Height: 5' 2" (157.5 cm)  Weight: 81.1 kg (178 lb 12.7 oz)  BSA (Calculated - sq m): 1.88 sq meters  BMI (Calculated): 32.7  Weight in (lb) to have BMI = 25: 136.4      Patient Position: Sitting      Physical Exam  Vitals reviewed.   Constitutional:       General: She is not in acute distress.     Appearance: Normal appearance. She is not ill-appearing, toxic-appearing or diaphoretic.   HENT:      Head: Normocephalic and atraumatic.      Right Ear: Tympanic membrane normal.      Left Ear: Tympanic membrane " normal.      Mouth/Throat:      Mouth: Mucous membranes are moist.   Eyes:      Comments: Cisco arcus senilis;  Left cataract   Neck:      Vascular: No carotid bruit.   Cardiovascular:      Rate and Rhythm: Normal rate and regular rhythm.      Pulses: Normal pulses.      Heart sounds: Normal heart sounds. No murmur heard.     Comments: Trace edema BLE  Pulmonary:      Effort: No respiratory distress.      Breath sounds: Normal breath sounds. No wheezing.   Abdominal:      General: Bowel sounds are normal. There is no distension.      Palpations: Abdomen is soft.      Tenderness: There is no abdominal tenderness. There is no guarding or rebound.   Musculoskeletal:         General: Normal range of motion.   Skin:     General: Skin is warm.   Neurological:      General: No focal deficit present.      Mental Status: She is alert and oriented to person, place, and time.   Psychiatric:         Mood and Affect: Mood normal.         Behavior: Behavior normal.          Laboratory:  CBC:  Recent Labs   Lab 02/06/23  0926 11/27/23  1838 11/28/23  0347   WBC 4.09 5.91 5.92   RBC 4.48 4.65 4.39   Hemoglobin 13.9 14.1 13.7   Hematocrit 41.1 42.0 39.0   Platelets 159 206 181   MCV 92 90 89   MCH 31.0 30.3 31.2 H   MCHC 33.8 33.6 35.1       CMP:  Recent Labs   Lab 11/27/23  1838 11/28/23  0347 12/15/23  0904 02/09/24  0921   Glucose 133 H 171 H   < > 125 H   Calcium 10.2 9.7   < > 9.3   Albumin 4.0 3.6  --  3.9   Total Protein 8.2 7.4  --  7.6   Sodium 136 137   < > 136   Potassium 3.5 3.4 L   < > 3.7   CO2 27 25   < > 24   Chloride 99 102   < > 101   BUN 19 19   < > 22   Creatinine 1.0 0.9   < > 1.1   Alkaline Phosphatase 88 83  --  74   ALT 28 28  --  22   AST 29 28  --  28   Total Bilirubin 0.3 0.2  --  0.6    < > = values in this interval not displayed.           URINALYSIS:  Recent Labs   Lab 11/09/23  1037 11/09/23  1039 05/02/24  0932   Color, UA Yellow Colorless A  --    Clarity, UA Clear  --   --    Spec Grav UA 1.010  --    --    Specific Gravity, UA  --  1.010  --    pH, UA 7 7.0 7.0   Protein, UA  --  Negative  --    Nitrite, UA negative Negative  --    Leukocytes, UA  --  1+ A  --    Urobilinogen, UA normal  --   --         LIPIDS:  Recent Labs   Lab 01/21/22  0824 08/03/22  1024 02/06/23  0926 02/09/24  0921   TSH 2.23  --   --   --    HDL  --  44 41 41   Cholesterol  --  161 143 178   Triglycerides  --  101 82 101   LDL Cholesterol  --  96.8 85.6 116.8   HDL/Cholesterol Ratio  --  27.3 28.7 23.0   Non-HDL Cholesterol  --  117 102 137   Total Cholesterol/HDL Ratio  --  3.7 3.5 4.3       TSH:  Recent Labs   Lab 01/21/22  0824   TSH 2.23       A1C:  Recent Labs   Lab 07/29/21  0919 08/03/22  1024 02/06/23  0926 08/08/23  1340 11/14/23  0939 02/09/24  0921   Hemoglobin A1C 6.1 H 6.4 H 6.4 H 6.6 H 6.1 H 6.0 H       Urine Microalbumin/Cr:  Recent Labs   Lab 11/09/23  1035   Microalb/Creat Ratio 22.9       Assessment/Plan     Jasmin AlfaroLydiaEthan is a 78 y.o.female with:    Type 2 diabetes mellitus with stage 3 chronic kidney disease, without long-term current use of insulin, unspecified whether stage 3a or 3b CKD  - Controlled.  Cont current.     Hyperlipidemia, unspecified hyperlipidemia type  -     rosuvastatin (CRESTOR) 5 MG tablet; 1 po Q other evening  Dispense: 45 tablet; Refill: 1  -Uncontrolled but she has not been taking the crestor. She has since resumed.     Fatty liver  -     Ambulatory referral/consult to Hepatology; Future; Expected date: 05/23/2024  - Elev fib 4. Refer to hepatology.     Primary hypertension  -     spironolactone (ALDACTONE) 25 MG tablet; Take 1 tablet (25 mg total) by mouth once daily.  Dispense: 90 tablet; Refill: 1  -     BASIC METABOLIC PANEL; Future; Expected date: 05/23/2024  - We discussed options and she would like to change to aldactone and stop HCTZ and Kcl. Repeat  BMP in 1 -2 wks.      Osteopenia, unspecified location  - Stable.  Cont current regimen.    Aortic atherosclerosis  -  Stable.  Cont current regimen.    Hypokalemia  - She would like to change to aldactone and stop HCTZ and Kcl. Repeat  BMP in 1 -2 wks.      Screening mammogram, encounter for  -     Mammo Digital Screening Bilat w/ [facility]; Future; Expected date: 06/29/2024         Chronic conditions status updated as per HPI.  Other than changes above, cont current medications and maintain follow up with specialists.  Follow up in about 2 months (around 7/28/2024) for fu chronic issues or sooner if needed, as prev sched or sooner if needed.      Gloria Smith MD  [facility]

## 2024-05-16 ENCOUNTER — OFFICE VISIT (OUTPATIENT)
Dept: PRIMARY CARE CLINIC | Facility: CLINIC | Age: 79
End: 2024-05-16
Payer: MEDICARE

## 2024-05-16 VITALS
SYSTOLIC BLOOD PRESSURE: 136 MMHG | TEMPERATURE: 98 F | HEIGHT: 62 IN | OXYGEN SATURATION: 97 % | BODY MASS INDEX: 32.91 KG/M2 | WEIGHT: 178.81 LBS | DIASTOLIC BLOOD PRESSURE: 60 MMHG | HEART RATE: 68 BPM

## 2024-05-16 DIAGNOSIS — E78.5 HYPERLIPIDEMIA, UNSPECIFIED HYPERLIPIDEMIA TYPE: ICD-10-CM

## 2024-05-16 DIAGNOSIS — M85.80 OSTEOPENIA, UNSPECIFIED LOCATION: ICD-10-CM

## 2024-05-16 DIAGNOSIS — K76.0 FATTY LIVER: ICD-10-CM

## 2024-05-16 DIAGNOSIS — E87.6 HYPOKALEMIA: ICD-10-CM

## 2024-05-16 DIAGNOSIS — Z12.31 SCREENING MAMMOGRAM, ENCOUNTER FOR: ICD-10-CM

## 2024-05-16 DIAGNOSIS — I10 PRIMARY HYPERTENSION: ICD-10-CM

## 2024-05-16 DIAGNOSIS — I70.0 AORTIC ATHEROSCLEROSIS: ICD-10-CM

## 2024-05-16 DIAGNOSIS — N18.30 TYPE 2 DIABETES MELLITUS WITH STAGE 3 CHRONIC KIDNEY DISEASE, WITHOUT LONG-TERM CURRENT USE OF INSULIN, UNSPECIFIED WHETHER STAGE 3A OR 3B CKD: Primary | ICD-10-CM

## 2024-05-16 DIAGNOSIS — E11.22 TYPE 2 DIABETES MELLITUS WITH STAGE 3 CHRONIC KIDNEY DISEASE, WITHOUT LONG-TERM CURRENT USE OF INSULIN, UNSPECIFIED WHETHER STAGE 3A OR 3B CKD: Primary | ICD-10-CM

## 2024-05-16 PROCEDURE — 1160F RVW MEDS BY RX/DR IN RCRD: CPT | Mod: HCNC,CPTII,S$GLB, | Performed by: INTERNAL MEDICINE

## 2024-05-16 PROCEDURE — 1159F MED LIST DOCD IN RCRD: CPT | Mod: HCNC,CPTII,S$GLB, | Performed by: INTERNAL MEDICINE

## 2024-05-16 PROCEDURE — 99214 OFFICE O/P EST MOD 30 MIN: CPT | Mod: HCNC,S$GLB,, | Performed by: INTERNAL MEDICINE

## 2024-05-16 PROCEDURE — 3075F SYST BP GE 130 - 139MM HG: CPT | Mod: HCNC,CPTII,S$GLB, | Performed by: INTERNAL MEDICINE

## 2024-05-16 PROCEDURE — 3288F FALL RISK ASSESSMENT DOCD: CPT | Mod: HCNC,CPTII,S$GLB, | Performed by: INTERNAL MEDICINE

## 2024-05-16 PROCEDURE — 1126F AMNT PAIN NOTED NONE PRSNT: CPT | Mod: HCNC,CPTII,S$GLB, | Performed by: INTERNAL MEDICINE

## 2024-05-16 PROCEDURE — 99999 PR PBB SHADOW E&M-EST. PATIENT-LVL V: CPT | Mod: PBBFAC,HCNC,, | Performed by: INTERNAL MEDICINE

## 2024-05-16 PROCEDURE — 3078F DIAST BP <80 MM HG: CPT | Mod: HCNC,CPTII,S$GLB, | Performed by: INTERNAL MEDICINE

## 2024-05-16 PROCEDURE — 1101F PT FALLS ASSESS-DOCD LE1/YR: CPT | Mod: HCNC,CPTII,S$GLB, | Performed by: INTERNAL MEDICINE

## 2024-05-16 RX ORDER — ROSUVASTATIN CALCIUM 5 MG/1
TABLET, COATED ORAL
Qty: 45 TABLET | Refills: 1 | Status: SHIPPED | OUTPATIENT
Start: 2024-05-16

## 2024-05-16 RX ORDER — SPIRONOLACTONE 25 MG/1
25 TABLET ORAL DAILY
Qty: 90 TABLET | Refills: 1 | Status: SHIPPED | OUTPATIENT
Start: 2024-05-16

## 2024-05-23 ENCOUNTER — LAB VISIT (OUTPATIENT)
Dept: LAB | Facility: OTHER | Age: 79
End: 2024-05-23
Attending: INTERNAL MEDICINE
Payer: MEDICARE

## 2024-05-23 DIAGNOSIS — I10 PRIMARY HYPERTENSION: ICD-10-CM

## 2024-05-23 LAB
ANION GAP SERPL CALC-SCNC: 12 MMOL/L (ref 8–16)
BUN SERPL-MCNC: 22 MG/DL (ref 8–23)
CALCIUM SERPL-MCNC: 9.8 MG/DL (ref 8.7–10.5)
CHLORIDE SERPL-SCNC: 102 MMOL/L (ref 95–110)
CO2 SERPL-SCNC: 22 MMOL/L (ref 23–29)
CREAT SERPL-MCNC: 1.1 MG/DL (ref 0.5–1.4)
EST. GFR  (NO RACE VARIABLE): 52 ML/MIN/1.73 M^2
GLUCOSE SERPL-MCNC: 110 MG/DL (ref 70–110)
POTASSIUM SERPL-SCNC: 4 MMOL/L (ref 3.5–5.1)
SODIUM SERPL-SCNC: 136 MMOL/L (ref 136–145)

## 2024-05-23 PROCEDURE — 80048 BASIC METABOLIC PNL TOTAL CA: CPT | Mod: HCNC | Performed by: INTERNAL MEDICINE

## 2024-05-23 PROCEDURE — 36415 COLL VENOUS BLD VENIPUNCTURE: CPT | Mod: HCNC | Performed by: INTERNAL MEDICINE

## 2024-05-24 ENCOUNTER — TELEPHONE (OUTPATIENT)
Dept: HEPATOLOGY | Facility: CLINIC | Age: 79
End: 2024-05-24
Payer: MEDICARE

## 2024-05-24 NOTE — TELEPHONE ENCOUNTER
Called the pt and rescheduled her clinic visit on 7/1/24 in [facility].Send schedule by mail.

## 2024-05-24 NOTE — PROGRESS NOTES
I sent pt a my chart message -  I reviewed your labs.  Your kidney function remains stable. Continue to work on hydration. Your potassium is normal.   Dr. GALVAN

## 2024-05-30 ENCOUNTER — TELEPHONE (OUTPATIENT)
Dept: SPORTS MEDICINE | Facility: CLINIC | Age: 79
End: 2024-05-30
Payer: MEDICARE

## 2024-05-30 ENCOUNTER — OFFICE VISIT (OUTPATIENT)
Dept: SPORTS MEDICINE | Facility: CLINIC | Age: 79
End: 2024-05-30
Payer: MEDICARE

## 2024-05-30 ENCOUNTER — HOSPITAL ENCOUNTER (OUTPATIENT)
Dept: RADIOLOGY | Facility: HOSPITAL | Age: 79
Discharge: HOME | End: 2024-05-30
Attending: STUDENT IN AN ORGANIZED HEALTH CARE EDUCATION/TRAINING PROGRAM
Payer: MEDICARE

## 2024-05-30 VITALS
BODY MASS INDEX: 32.86 KG/M2 | SYSTOLIC BLOOD PRESSURE: 117 MMHG | WEIGHT: 178.56 LBS | DIASTOLIC BLOOD PRESSURE: 64 MMHG | HEART RATE: 63 BPM | HEIGHT: 62 IN

## 2024-05-30 DIAGNOSIS — M25.562 LEFT KNEE PAIN, UNSPECIFIED CHRONICITY: ICD-10-CM

## 2024-05-30 DIAGNOSIS — M17.12 PRIMARY OSTEOARTHRITIS OF LEFT KNEE: ICD-10-CM

## 2024-05-30 DIAGNOSIS — G89.29 CHRONIC PAIN OF LEFT KNEE: Primary | ICD-10-CM

## 2024-05-30 DIAGNOSIS — M25.562 CHRONIC PAIN OF LEFT KNEE: Primary | ICD-10-CM

## 2024-05-30 PROCEDURE — 3074F SYST BP LT 130 MM HG: CPT | Mod: HCNC,CPTII,S$GLB, | Performed by: STUDENT IN AN ORGANIZED HEALTH CARE EDUCATION/TRAINING PROGRAM

## 2024-05-30 PROCEDURE — 3078F DIAST BP <80 MM HG: CPT | Mod: HCNC,CPTII,S$GLB, | Performed by: STUDENT IN AN ORGANIZED HEALTH CARE EDUCATION/TRAINING PROGRAM

## 2024-05-30 PROCEDURE — 20611 DRAIN/INJ JOINT/BURSA W/US: CPT | Mod: HCNC,LT,S$GLB, | Performed by: STUDENT IN AN ORGANIZED HEALTH CARE EDUCATION/TRAINING PROGRAM

## 2024-05-30 PROCEDURE — 73564 X-RAY EXAM KNEE 4 OR MORE: CPT | Mod: TC,HCNC,LT

## 2024-05-30 PROCEDURE — 1101F PT FALLS ASSESS-DOCD LE1/YR: CPT | Mod: HCNC,CPTII,S$GLB, | Performed by: STUDENT IN AN ORGANIZED HEALTH CARE EDUCATION/TRAINING PROGRAM

## 2024-05-30 PROCEDURE — 73562 X-RAY EXAM OF KNEE 3: CPT | Mod: 26,HCNC,RT, | Performed by: RADIOLOGY

## 2024-05-30 PROCEDURE — 1159F MED LIST DOCD IN RCRD: CPT | Mod: HCNC,CPTII,S$GLB, | Performed by: STUDENT IN AN ORGANIZED HEALTH CARE EDUCATION/TRAINING PROGRAM

## 2024-05-30 PROCEDURE — 73564 X-RAY EXAM KNEE 4 OR MORE: CPT | Mod: 26,HCNC,LT, | Performed by: RADIOLOGY

## 2024-05-30 PROCEDURE — 73562 X-RAY EXAM OF KNEE 3: CPT | Mod: TC,HCNC,RT

## 2024-05-30 PROCEDURE — 99999 PR PBB SHADOW E&M-EST. PATIENT-LVL IV: CPT | Mod: PBBFAC,HCNC,, | Performed by: STUDENT IN AN ORGANIZED HEALTH CARE EDUCATION/TRAINING PROGRAM

## 2024-05-30 PROCEDURE — 1125F AMNT PAIN NOTED PAIN PRSNT: CPT | Mod: HCNC,CPTII,S$GLB, | Performed by: STUDENT IN AN ORGANIZED HEALTH CARE EDUCATION/TRAINING PROGRAM

## 2024-05-30 PROCEDURE — 99214 OFFICE O/P EST MOD 30 MIN: CPT | Mod: 25,HCNC,S$GLB, | Performed by: STUDENT IN AN ORGANIZED HEALTH CARE EDUCATION/TRAINING PROGRAM

## 2024-05-30 PROCEDURE — 1160F RVW MEDS BY RX/DR IN RCRD: CPT | Mod: HCNC,CPTII,S$GLB, | Performed by: STUDENT IN AN ORGANIZED HEALTH CARE EDUCATION/TRAINING PROGRAM

## 2024-05-30 PROCEDURE — 3288F FALL RISK ASSESSMENT DOCD: CPT | Mod: HCNC,CPTII,S$GLB, | Performed by: STUDENT IN AN ORGANIZED HEALTH CARE EDUCATION/TRAINING PROGRAM

## 2024-05-30 RX ORDER — TRIAMCINOLONE ACETONIDE 40 MG/ML
40 INJECTION, SUSPENSION INTRA-ARTICULAR; INTRAMUSCULAR
Status: DISCONTINUED | OUTPATIENT
Start: 2024-05-30 | End: 2024-05-30 | Stop reason: HOSPADM

## 2024-05-30 RX ADMIN — TRIAMCINOLONE ACETONIDE 40 MG: 40 INJECTION, SUSPENSION INTRA-ARTICULAR; INTRAMUSCULAR at 04:05

## 2024-05-30 NOTE — TELEPHONE ENCOUNTER
Called patient to inform them to arrive 15 mins early to complete x-rays prior to appointment on 5/30.    Maryana Moreno MS, OTC  Clinical Assistant to Dr. Nurys Tobias

## 2024-05-30 NOTE — PROGRESS NOTES
CC: left knee pain    78 y.o. Female presents today for evaluation of her left knee pain. Pt reports intermittent knee pain for a few years, but states pain was severe in . Pt reports gradual onset. Pt localizes pain to lateral and posterior aspects of the knee. Pt localizes swelling into posterior knee. Pt reports pain is 6/10 today, and states she took a tylenol arthritis earlier today. Pt denies mechanical symptoms, but states knee will buckle on her. Pt reports tingling in posterior calf, plantar foot, and into toes a few nights ago while laying down; pt denies prev hx of lumbar pathology.     SYMPTOMS:   Pain Score: 6/10  Pain location: posterior, lateral  Time of onset: a few years, severe starting in   Trauma, injury: gradual onset     Audible pop: no  Clicking: no  Catching: no  Locking: no  Giving out, instability: yes  Swelling: yes, posterior  Theater sign: yes  Problems with stairs: yes    INTERVENTIONS:   Medications tried: tylenol arthrits PRN (some relief), naproxen as directed (was , had relief)  Braces/devices: open patella brace with straps   Physical therapy: none  Injections: none    RELEVANT HISTORY:   Imaging to date: 24  Previous significant knee injuries: none  Previous knee surgeries: none    Occupation: retired     REVIEW OF SYSTEMS:   Constitution: Patient denies fever or chills.  Eyes: Patient denies eye pain or vision changes.  HEENT: Patient denies ear pain, sore throat, or nasal discharge.  CVS: Patient denies chest pain.  Lungs: Patient denies shortness of breath or cough.  Abdomen: Patient denies any stomach pain, nausea, vomiting, or diarrhea  Skin: Patient denies skin rash or itching.    Musculoskeletal: Patient denies recent injuries or trauma.  Neuro: Patient reports intermittent numbness/tingling into left leg and bilateral hands.  Psych: Patient denies any current anxiety or nervousness.    PAST MEDICAL HISTORY:   Past Medical History:   Diagnosis Date     Allergic rhinitis     Anxiety     Aortic atherosclerosis     CKD (chronic kidney disease)     Colon polyps     COVID-19 11/25/2020 11/20/23    DDD (degenerative disc disease), cervical     DVT (deep venous thrombosis)     Hyperlipidemia     Hypertension     Multiple thyroid nodules     Prediabetes     Pulmonary emboli        PAST SURGICAL HISTORY:  Past Surgical History:   Procedure Laterality Date    APPENDECTOMY      BIOPSY OF THYROID      COLONOSCOPY N/A 02/24/2024    Procedure: COLONOSCOPY;  Surgeon: Kat Mcwilliams MD;  Location: [facility] ([address] [place] [address]);  Service: Endoscopy;  Laterality: N/A;  referral: Dr. Gloria Smith / prep ins on portal - PEG (Pt confirmed receipt of prep from previous r/s)/ any MD [facility] only per pt request- ERW  2/19-lvm for precall-MS  2/20-pt confirmed appt-cf    EYE SURGERY  12/2020    HERNIA REPAIR  2007    HYSTERECTOMY      INCISIONAL HERNIA REPAIR      LAPAROTOMY         FAMILY HISTORY:  Family History   Problem Relation Name Age of Onset    Hypertension Mother Della Shah     Cancer Mother Della Shah         small intestines    Heart failure Mother Della Shah     Hypertension Father Edilberto     Heart disease Father Casanova     Hypertension Sister None     Kidney failure Sister None         ESRD on HD    Diabetes type I Sister Mary Ann     Diabetes Sister Mary Ann     Hypothyroidism Sister Brenda     Hypertension Brother Jaison Pablo     Colon cancer Brother Jaison Pablo     Heart failure Brother Jaison Pablo     Kidney failure Brother Jaison Shah         ESRD on HD    Kidney disease Brother Trev Shah         Kidney Disease       SOCIAL HISTORY:  Social History     Socioeconomic History    Marital status:    Tobacco Use    Smoking status: Never    Smokeless tobacco: Never   Substance and Sexual Activity    Alcohol use: Never    Drug use: Never    Sexual activity: Yes     Partners: Male     Birth control/protection: Post-menopausal     Social  Determinants of Health     Financial Resource Strain: Low Risk  (3/26/2024)    Overall Financial Resource Strain (CARDIA)     Difficulty of Paying Living Expenses: Not hard at all   Food Insecurity: No Food Insecurity (3/26/2024)    Hunger Vital Sign     Worried About Running Out of Food in the Last Year: Never true     Ran Out of Food in the Last Year: Never true   Transportation Needs: No Transportation Needs (3/26/2024)    PRAPARE - Transportation     Lack of Transportation (Medical): No     Lack of Transportation (Non-Medical): No   Physical Activity: Insufficiently Active (3/26/2024)    Exercise Vital Sign     Days of Exercise per Week: 4 days     Minutes of Exercise per Session: 30 min   Stress: No Stress Concern Present (3/26/2024)    [employer] - Occupational Stress Questionnaire     Feeling of Stress : Not at all   Housing Stability: Low Risk  (3/26/2024)    Housing Stability Vital Sign     Unable to Pay for Housing in the Last Year: No     Number of Places Lived in the Last Year: 0     Unstable Housing in the Last Year: No       MEDICATIONS:     Current Outpatient Medications:     amLODIPine (NORVASC) 10 MG tablet, Take 1 tablet (10 mg total) by mouth once daily., Disp: 90 tablet, Rfl: 0    aspirin 81 MG Chew, Take 81 mg by mouth once daily. , Disp: , Rfl:     atenoloL (TENORMIN) 50 MG tablet, Take 1 tablet (50 mg total) by mouth once daily., Disp: 90 tablet, Rfl: 0    azelastine (ASTELIN) 137 mcg (0.1 %) nasal spray, 1 spray (137 mcg total) by Nasal route 2 (two) times daily. As needed, Disp: 30 mL, Rfl: 3    blood sugar diagnostic Strp, To check BG twice daily, to use with TrueMetrix meter, Disp: 200 strip, Rfl: 3    blood-glucose meter kit, To check BG once daily, to use with insurance preferred meter, Disp: 1 each, Rfl: 0    diclofenac sodium (VOLTAREN) 1 % Gel, Apply 2 g topically 4 (four) times daily. As needed, Disp: 100 g, Rfl: 1    docusate sodium (COLACE) 100 MG  "capsule, Take 1 capsule (100 mg total) by mouth 2 (two) times daily., Disp: 60 capsule, Rfl: 0    EPINEPHrine (EPIPEN 2-YAA) 0.3 mg/0.3 mL AtIn, Inject 0.3 mLs (0.3 mg total) into the muscle as needed., Disp: 1 each, Rfl: 0    gabapentin (NEURONTIN) 100 MG capsule, TAKE 1 CAPSULE THREE TIMES DAILY (Patient taking differently: As needed), Disp: 90 capsule, Rfl: 2    hydrocortisone (ANUSOL-HC) 2.5 % rectal cream, Place rectally 2 (two) times daily., Disp: 28 g, Rfl: 1    lancets Misc, To check BG twice daily, to use with insurance preferred meter, Disp: 200 each, Rfl: 3    levocetirizine (XYZAL) 5 MG tablet, Take 1 tablet (5 mg total) by mouth every evening., Disp: 30 tablet, Rfl: 11    multivit with minerals/lutein (MULTIVITAMIN 50 PLUS ORAL), Take by mouth once daily. , Disp: , Rfl:     omeprazole (PRILOSEC) 20 MG capsule, TAKE 1 CAPSULE ONE TIME DAILY, Disp: 90 capsule, Rfl: 3    rosuvastatin (CRESTOR) 5 MG tablet, 1 po Q other evening, Disp: 45 tablet, Rfl: 1    spironolactone (ALDACTONE) 25 MG tablet, Take 1 tablet (25 mg total) by mouth once daily., Disp: 90 tablet, Rfl: 1    TRUE METRIX GLUCOSE METER Misc, USE TO TEST BLOOD GLUCOSE ONCE DAILY (Patient not taking: Reported on 5/16/2024), Disp: , Rfl:     TRUEPLUS LANCETS 33 gauge Misc, , Disp: , Rfl:     ALLERGIES:   Review of patient's allergies indicates:   Allergen Reactions    Meperidine Hives and Other (See Comments)     Unknown reaction          PHYSICAL EXAMINATION:  /64   Pulse 63   Ht 5' 2" (1.575 m)   Wt 81 kg (178 lb 9.2 oz)   BMI 32.66 kg/m²   Vitals signs and nursing note have been reviewed.    General: In no acute distress, well developed, well nourished, no diaphoresis  Eyes: EOM full and smooth, no eye redness or discharge  HEENT: normocephalic and atraumatic, neck supple, trachea midline, no nasal discharge  Cardiovascular: no LE edema  Lungs: respirations non-labored, no conversational dyspnea   Neuro: AAOx3, CN2-12 grossly " intact  Skin: No rashes, warm and dry  Psychiatric: cooperative, pleasant, mood and affect appropriate for age    Left Knee:   Gait: mildly antalgic    Inspection/Palpation:   -Rubor   -Calor  -Effusion   -Patella ballotable   -Patellar apprehension  -Retinacular tenderness   +Patellar crepitus   Patellar tilt grossly normal     TTP at:  +Joint line   -MCL   -LCL   -Popliteal region   -Quad tendon   -Patella  -Pat tendon  -Pat border  -Med condyle   -Lat condyle   -Pes   -Prox fibula   -Tib tub  -Gerdy's tubercle  -Distal Hamstring tendons  -Proximal Hamstrings/Ischial tuberosity  -ITB    ROM:   Ext: 0°   Flex:130°   Popliteal Angle: 30°   +Discomfort w/ full flex   +Bounce-home discomfort     Ligamentous:   -Ant drawer   -Post drawer   -Lachman's   Good endpoints & no pain w/ valgus & varus stress    Meniscal:  +Alena's for pain  +Nicolasa     Other:  -Patellar apprehension  +Patellar grind  +Holden's   -J sign  -Emiliana's  Abductors     IMAGIN. Knee X-ray ordered due to left knee pain. 4 views taken today.   2. X-ray images were reviewed personally by me and then directly with patient.  3. FINDINGS:  Normal bony alignment, no signs of acute fracture or dislocation, degenerative changes, soft tissues unremarkable.    4. IMPRESSION:  Kellgren Gray grade 3 osteoarthritic changes.  No acute osseous abnormalities appreciated.      Diagnostic Extremity - [facility]-Sports Ultrasound was recommended due to left knee(s) evaluation.    FOCUSED: examination.     TECHNIQUE: Real time ultrasound examination of the left knee was performed with SonTelerate Edge 2, 9-L MHz linear probe.     FINDINGS: The images are of adequate diagnostic quality with identification of all echogenic structures made except for the vascular structures unless otherwise noted. There is no sonographic evidence of periosteal abnormalities, soft tissue edema, musculotendinous or nerve irregularities. The rest of the sonographic examination was  unremarkable.    Ultrasound images were directly reviewed with the patient and then a treatment plan was discussed.     Images were stored in the patients medical record.     IMPRESSION: No effusion to be aspirated.       ASSESSMENT:      ICD-10-CM ICD-9-CM   1. Left knee pain, unspecified chronicity  M25.562 719.46         PLAN:  Based on patient history, physical exam findings, and imaging I believe patient's main pain generator is her primary osteoarthritis of her knee.  Lengthy discussion was had with patient today regarding the various treatment options for osteoarthritis of the knee, which is thought to be the primary pain generator for the patient.  These options included:  - corticosteroid injection with triamcinolone  - hyaluronic acid injections  - long-acting corticosteroid injection with Zilretta  - platelet rich plasma  - Iovera  - genicular nerve ablation  - definitive treatment with total knee arthroplasty    Risks and benefits were discussed with patient prior to receiving injection.  Depending on injection type, risks include the possibility of infection, pain, disruptions in blood pressure and blood sugar, and cosmetic deformity at site of injection.    All questions were answered to the best of my ability and all concerns were addressed at this time.    Follow up in 6 week(s) for above, or sooner if needed.      This note is dictated using the M*Modal Fluency Direct word recognition program. There are word recognition mistakes that are occasionally missed on review.

## 2024-05-30 NOTE — PROCEDURES
Large Joint Aspiration/Injection: L knee    Date/Time: 5/30/2024 4:15 PM    Performed by: Nurys Tobias MD  Authorized by: Nurys Tobias MD    Consent Done?:  Yes (Verbal)  Indications:  Arthritis and pain  Site marked: the procedure site was marked    Timeout: prior to procedure the correct patient, procedure, and site was verified    Prep: patient was prepped and draped in usual sterile fashion      Local anesthesia used?: Yes    Anesthesia:  Local infiltration  Local anesthetic:  Co-phenylcaine spray    Details:  Needle Size:  22 G  Ultrasonic Guidance for needle placement?: Yes (Ultrasound guidance used to avoid neurovascular injury and/or to improve accuracy given body habitus.)    Images are saved and documented.  Approach: Superolateral.  Location:  Knee  Site:  L knee  Medications:  40 mg triamcinolone acetonide 40 mg/mL  Medications comment:  Ropivacaine 0.2% 2mL  Patient tolerance:  Patient tolerated the procedure well with no immediate complications     TECHNIQUE: Real time ultrasound examination of the left knee was performed with SonFrenzoote Edge 2, 9-L MHz linear probe(s). Ultrasound guidance was used for needle localization. Images were saved and stored for documentation. Dynamic visualization of the needle was continuous throughout the procedures and maintained in good position.

## 2024-06-27 ENCOUNTER — PATIENT MESSAGE (OUTPATIENT)
Dept: PRIMARY CARE CLINIC | Facility: CLINIC | Age: 79
End: 2024-06-27
Payer: MEDICARE

## 2024-06-27 DIAGNOSIS — R25.2 LEG CRAMP: ICD-10-CM

## 2024-06-27 DIAGNOSIS — I10 PRIMARY HYPERTENSION: Primary | ICD-10-CM

## 2024-06-27 DIAGNOSIS — Z79.899 ON POTASSIUM SPARING DIURETIC THERAPY: ICD-10-CM

## 2024-06-27 NOTE — TELEPHONE ENCOUNTER
Repeat a BMP, Magnesium, and CK. She can cut in half and take 12.5 mg/d.  Make sure she had a fu with me t o repeat BP  Dr. GALVAN

## 2024-06-27 NOTE — TELEPHONE ENCOUNTER
Lov 5/16/24  Spironolactone was added on last visit. Pt has been expericing these sxs since starting. You d/u hctz and potassium chloride